# Patient Record
Sex: MALE | Race: WHITE
[De-identification: names, ages, dates, MRNs, and addresses within clinical notes are randomized per-mention and may not be internally consistent; named-entity substitution may affect disease eponyms.]

---

## 2019-08-20 ENCOUNTER — HOSPITAL ENCOUNTER (EMERGENCY)
Dept: HOSPITAL 75 - ER | Age: 64
Discharge: PSYCHIATRIC OR SUBSTANCE USE FACILITY | End: 2019-08-20
Payer: COMMERCIAL

## 2019-08-20 VITALS — WEIGHT: 145 LBS | HEIGHT: 70 IN | BODY MASS INDEX: 20.76 KG/M2

## 2019-08-20 VITALS — SYSTOLIC BLOOD PRESSURE: 122 MMHG | DIASTOLIC BLOOD PRESSURE: 86 MMHG

## 2019-08-20 DIAGNOSIS — R45.851: Primary | ICD-10-CM

## 2019-08-20 LAB
ALBUMIN SERPL-MCNC: 4.3 GM/DL (ref 3.2–4.5)
ALP SERPL-CCNC: 92 U/L (ref 40–136)
ALT SERPL-CCNC: 10 U/L (ref 0–55)
APAP SERPL-MCNC: < 10 UG/ML (ref 10–30)
APTT PPP: YELLOW S
BACTERIA #/AREA URNS HPF: NEGATIVE /HPF
BARBITURATES UR QL: NEGATIVE
BASOPHILS # BLD AUTO: 0 10^3/UL (ref 0–0.1)
BASOPHILS NFR BLD AUTO: 1 % (ref 0–10)
BENZODIAZ UR QL SCN: NEGATIVE
BILIRUB SERPL-MCNC: 0.4 MG/DL (ref 0.1–1)
BILIRUB UR QL STRIP: NEGATIVE
BUN/CREAT SERPL: 13
CALCIUM SERPL-MCNC: 9.1 MG/DL (ref 8.5–10.1)
CHLORIDE SERPL-SCNC: 102 MMOL/L (ref 98–107)
CO2 SERPL-SCNC: 22 MMOL/L (ref 21–32)
COCAINE UR QL: NEGATIVE
CREAT SERPL-MCNC: 0.99 MG/DL (ref 0.6–1.3)
EOSINOPHIL # BLD AUTO: 0.1 10^3/UL (ref 0–0.3)
EOSINOPHIL NFR BLD AUTO: 2 % (ref 0–10)
ERYTHROCYTE [DISTWIDTH] IN BLOOD BY AUTOMATED COUNT: 12.8 % (ref 10–14.5)
FIBRINOGEN PPP-MCNC: CLEAR MG/DL
GFR SERPLBLD BASED ON 1.73 SQ M-ARVRAT: > 60 ML/MIN
GLUCOSE SERPL-MCNC: 116 MG/DL (ref 70–105)
GLUCOSE UR STRIP-MCNC: NEGATIVE MG/DL
HCT VFR BLD CALC: 43 % (ref 40–54)
HGB BLD-MCNC: 14.9 G/DL (ref 13.3–17.7)
KETONES UR QL STRIP: NEGATIVE
LEUKOCYTE ESTERASE UR QL STRIP: NEGATIVE
LYMPHOCYTES # BLD AUTO: 1.3 X 10^3 (ref 1–4)
LYMPHOCYTES NFR BLD AUTO: 29 % (ref 12–44)
MANUAL DIFFERENTIAL PERFORMED BLD QL: NO
MCH RBC QN AUTO: 31 PG (ref 25–34)
MCHC RBC AUTO-ENTMCNC: 35 G/DL (ref 32–36)
MCV RBC AUTO: 89 FL (ref 80–99)
METHADONE UR QL SCN: NEGATIVE
METHAMPHETAMINE SCREEN URINE S: NEGATIVE
MONOCYTES # BLD AUTO: 0.5 X 10^3 (ref 0–1)
MONOCYTES NFR BLD AUTO: 10 % (ref 0–12)
NEUTROPHILS # BLD AUTO: 2.7 X 10^3 (ref 1.8–7.8)
NEUTROPHILS NFR BLD AUTO: 59 % (ref 42–75)
NITRITE UR QL STRIP: NEGATIVE
OPIATES UR QL SCN: NEGATIVE
OXYCODONE UR QL: NEGATIVE
PH UR STRIP: 5 [PH] (ref 5–9)
PLATELET # BLD: 239 10^3/UL (ref 130–400)
PMV BLD AUTO: 10.7 FL (ref 7.4–10.4)
POTASSIUM SERPL-SCNC: 4.4 MMOL/L (ref 3.6–5)
PROPOXYPH UR QL: NEGATIVE
PROT SERPL-MCNC: 6.9 GM/DL (ref 6.4–8.2)
PROT UR QL STRIP: NEGATIVE
RBC #/AREA URNS HPF: (no result) /HPF
SALICYLATES SERPL-MCNC: < 5 MG/DL (ref 5–20)
SODIUM SERPL-SCNC: 133 MMOL/L (ref 135–145)
SP GR UR STRIP: 1.02 (ref 1.02–1.02)
SQUAMOUS #/AREA URNS HPF: (no result) /HPF
TRICYCLICS UR QL SCN: NEGATIVE
UROBILINOGEN UR-MCNC: NORMAL MG/DL
WBC # BLD AUTO: 4.6 10^3/UL (ref 4.3–11)
WBC #/AREA URNS HPF: (no result) /HPF

## 2019-08-20 PROCEDURE — 80306 DRUG TEST PRSMV INSTRMNT: CPT

## 2019-08-20 PROCEDURE — 93005 ELECTROCARDIOGRAM TRACING: CPT

## 2019-08-20 PROCEDURE — 80320 DRUG SCREEN QUANTALCOHOLS: CPT

## 2019-08-20 PROCEDURE — 80053 COMPREHEN METABOLIC PANEL: CPT

## 2019-08-20 PROCEDURE — 85025 COMPLETE CBC W/AUTO DIFF WBC: CPT

## 2019-08-20 PROCEDURE — 36415 COLL VENOUS BLD VENIPUNCTURE: CPT

## 2019-08-20 PROCEDURE — 81000 URINALYSIS NONAUTO W/SCOPE: CPT

## 2019-08-20 PROCEDURE — 80329 ANALGESICS NON-OPIOID 1 OR 2: CPT

## 2019-08-20 NOTE — ED NEUROLOGICAL PROBLEM
General


Stated Complaint:  SUICIDAL IDEATION


Source:  patient


Exam Limitations:  no limitations





History of Present Illness


Date Seen by Provider:  Aug 20, 2019


Time Seen by Provider:  11:34


Initial Comments


To ER with reports that he is suicidal. She relates this to being homeless. He 

was living in [place], he moved here to be away from "those temptations. He is 

been clean from amphetamines and opiates for 2 months. States he plans to 

overdose.


Timing/Duration:  increasing


Severity:  moderate


Associated Symptoms:  other (suicidal)





Allergies and Home Medications


Patient Home Medication List


Home Medication List Reviewed:  Yes





Review of Systems


Review of Systems


Constitutional:  see HPI


Eyes:  No Symptoms Reported


Ears, Nose, Mouth, Throat:  no symptoms reported


Respiratory:  no symptoms reported


Cardiovascular:  no symptoms reported


Genitourinary:  no symptoms reported


Musculoskeletal:  no symptoms reported


Skin:  no symptoms reported


Psychiatric/Neurological:  See HPI





Physical Exam


Vital Signs





Vital Signs - First Documented








 8/20/19





 11:15


 


Temp 97.8


 


Pulse 93


 


Resp 20


 


B/P (MAP) 148/100 (116)


 


Pulse Ox 100


 


O2 Delivery Room Air





Capillary Refill :


Height, Weight, BMI


Height: '"


Weight: lbs. oz. kg;  BMI


Method:


General Appearance:  WD/WN, no apparent distress


HEENT:  PERRL/EOMI, normal ENT inspection


Respiratory:  no respiratory distress, no accessory muscle use


Cardiovascular:  regular rate, rhythm, no murmur


Gastrointestinal:  normal bowel sounds, non tender, soft


Neurologic/Psychiatric:  alert, normal mood/affect, oriented x 3


Crainal Nerves:  normal hearing, normal speech, PERRL


Skin:  normal color, warm/dry





Progress/Results/Core Measures


Results/Orders


Lab Results





Laboratory Tests








Test


 8/20/19


11:50 8/20/19


11:57 Range/Units


 


 


Urine Color YELLOW    


 


Urine Clarity CLEAR    


 


Urine pH 5   5-9  


 


Urine Specific Gravity 1.020   1.016-1.022  


 


Urine Protein NEGATIVE   NEGATIVE  


 


Urine Glucose (UA) NEGATIVE   NEGATIVE  


 


Urine Ketones NEGATIVE   NEGATIVE  


 


Urine Nitrite NEGATIVE   NEGATIVE  


 


Urine Bilirubin NEGATIVE   NEGATIVE  


 


Urine Urobilinogen NORMAL   NORMAL  MG/DL


 


Urine Leukocyte Esterase NEGATIVE   NEGATIVE  


 


Urine RBC (Auto) NEGATIVE   NEGATIVE  


 


Urine RBC NONE    /HPF


 


Urine WBC NONE    /HPF


 


Urine Squamous Epithelial


Cells RARE 


 


  /HPF





 


Urine Crystals NONE    /LPF


 


Urine Bacteria NEGATIVE    /HPF


 


Urine Casts NONE    /LPF


 


Urine Mucus MODERATE H   /LPF


 


Urine Culture Indicated NO    


 


Urine Opiates Screen NEGATIVE   NEGATIVE  


 


Urine Oxycodone Screen NEGATIVE   NEGATIVE  


 


Urine Methadone Screen NEGATIVE   NEGATIVE  


 


Urine Propoxyphene Screen NEGATIVE   NEGATIVE  


 


Urine Barbiturates Screen NEGATIVE   NEGATIVE  


 


Ur Tricyclic Antidepressants


Screen NEGATIVE 


 


 NEGATIVE  





 


Urine Phencyclidine Screen NEGATIVE   NEGATIVE  


 


Urine Amphetamines Screen NEGATIVE   NEGATIVE  


 


Urine Methamphetamines Screen NEGATIVE   NEGATIVE  


 


Urine Benzodiazepines Screen NEGATIVE   NEGATIVE  


 


Urine Cocaine Screen NEGATIVE   NEGATIVE  


 


Urine Cannabinoids Screen NEGATIVE   NEGATIVE  


 


White Blood Count


 


 4.6 


 4.3-11.0


10^3/uL


 


Red Blood Count


 


 4.82 


 4.35-5.85


10^6/uL


 


Hemoglobin  14.9  13.3-17.7  G/DL


 


Hematocrit  43  40-54  %


 


Mean Corpuscular Volume  89  80-99  FL


 


Mean Corpuscular Hemoglobin  31  25-34  PG


 


Mean Corpuscular Hemoglobin


Concent 


 35 


 32-36  G/DL





 


Red Cell Distribution Width  12.8  10.0-14.5  %


 


Platelet Count


 


 239 


 130-400


10^3/uL


 


Mean Platelet Volume  10.7 H 7.4-10.4  FL


 


Neutrophils (%) (Auto)  59  42-75  %


 


Lymphocytes (%) (Auto)  29  12-44  %


 


Monocytes (%) (Auto)  10  0-12  %


 


Eosinophils (%) (Auto)  2  0-10  %


 


Basophils (%) (Auto)  1  0-10  %


 


Neutrophils # (Auto)  2.7  1.8-7.8  X 10^3


 


Lymphocytes # (Auto)  1.3  1.0-4.0  X 10^3


 


Monocytes # (Auto)  0.5  0.0-1.0  X 10^3


 


Eosinophils # (Auto)


 


 0.1 


 0.0-0.3


10^3/uL


 


Basophils # (Auto)


 


 0.0 


 0.0-0.1


10^3/uL


 


Sodium Level  133 L 135-145  MMOL/L


 


Potassium Level  4.4  3.6-5.0  MMOL/L


 


Chloride Level  102    MMOL/L


 


Carbon Dioxide Level  22  21-32  MMOL/L


 


Anion Gap  9  5-14  MMOL/L


 


Blood Urea Nitrogen  13  7-18  MG/DL


 


Creatinine


 


 0.99 


 0.60-1.30


MG/DL


 


Estimat Glomerular Filtration


Rate 


 > 60 


  





 


BUN/Creatinine Ratio  13   


 


Glucose Level  116 H   MG/DL


 


Calcium Level  9.1  8.5-10.1  MG/DL


 


Corrected Calcium  8.9  8.5-10.1  MG/DL


 


Total Bilirubin  0.4  0.1-1.0  MG/DL


 


Aspartate Amino Transf


(AST/SGOT) 


 11 


 5-34  U/L





 


Alanine Aminotransferase


(ALT/SGPT) 


 10 


 0-55  U/L





 


Alkaline Phosphatase  92    U/L


 


Total Protein  6.9  6.4-8.2  GM/DL


 


Albumin  4.3  3.2-4.5  GM/DL


 


Salicylates Level  < 5.0 L 5.0-20.0  MG/DL


 


Acetaminophen Level  < 10 L 10-30  UG/ML


 


Serum Alcohol  < 10  <10  MG/DL








My Orders





Orders - PARKER BELLAMY


Cbc With Automated Diff (8/20/19 11:29)


Comprehensive Metabolic Panel (8/20/19 11:29)


Ua Culture If Indicated (8/20/19 11:29)


Drug Screen Stat (Urine) (8/20/19 11:29)


Ekg Tracing (8/20/19 11:29)


Alcohol (8/20/19 11:29)


Salicylate (8/20/19 11:29)


Acetaminophen (8/20/19 11:29)





Vital Signs/I&O











 8/20/19





 11:15


 


Temp 97.8


 


Pulse 93


 


Resp 20


 


B/P (MAP) 148/100 (116)


 


Pulse Ox 100


 


O2 Delivery Room Air











Departure


Communication (Admissions)


1314-Alireza welch from [facility] here to screen patient.





Impression





   Primary Impression:  


   Suicidal ideations


Disposition:  01 HOME, SELF-CARE


Condition:  Stable











PARKER BELLAMY             Aug 20, 2019 11:36

## 2019-08-25 ENCOUNTER — HOSPITAL ENCOUNTER (EMERGENCY)
Dept: HOSPITAL 75 - ER | Age: 64
Discharge: PSYCHIATRIC OR SUBSTANCE USE FACILITY | End: 2019-08-25
Payer: MEDICARE

## 2019-08-25 VITALS — DIASTOLIC BLOOD PRESSURE: 88 MMHG | SYSTOLIC BLOOD PRESSURE: 149 MMHG

## 2019-08-25 VITALS — BODY MASS INDEX: 21.47 KG/M2 | HEIGHT: 70 IN | WEIGHT: 150 LBS

## 2019-08-25 DIAGNOSIS — R45.851: Primary | ICD-10-CM

## 2019-08-25 DIAGNOSIS — F31.9: ICD-10-CM

## 2019-08-25 LAB
ALBUMIN SERPL-MCNC: 4.4 GM/DL (ref 3.2–4.5)
ALP SERPL-CCNC: 102 U/L (ref 40–136)
ALT SERPL-CCNC: 11 U/L (ref 0–55)
APAP SERPL-MCNC: < 10 UG/ML (ref 10–30)
APTT PPP: YELLOW S
BACTERIA #/AREA URNS HPF: (no result) /HPF
BARBITURATES UR QL: NEGATIVE
BASOPHILS # BLD AUTO: 0.1 10^3/UL (ref 0–0.1)
BASOPHILS NFR BLD AUTO: 1 % (ref 0–10)
BENZODIAZ UR QL SCN: NEGATIVE
BILIRUB SERPL-MCNC: 0.8 MG/DL (ref 0.1–1)
BILIRUB UR QL STRIP: NEGATIVE
BUN/CREAT SERPL: 21
CALCIUM SERPL-MCNC: 9.2 MG/DL (ref 8.5–10.1)
CHLORIDE SERPL-SCNC: 105 MMOL/L (ref 98–107)
CO2 SERPL-SCNC: 19 MMOL/L (ref 21–32)
COCAINE UR QL: NEGATIVE
CREAT SERPL-MCNC: 1 MG/DL (ref 0.6–1.3)
EOSINOPHIL # BLD AUTO: 0.2 10^3/UL (ref 0–0.3)
EOSINOPHIL NFR BLD AUTO: 2 % (ref 0–10)
ERYTHROCYTE [DISTWIDTH] IN BLOOD BY AUTOMATED COUNT: 13 % (ref 10–14.5)
FIBRINOGEN PPP-MCNC: (no result) MG/DL
GFR SERPLBLD BASED ON 1.73 SQ M-ARVRAT: > 60 ML/MIN
GLUCOSE SERPL-MCNC: 102 MG/DL (ref 70–105)
GLUCOSE UR STRIP-MCNC: NEGATIVE MG/DL
HCT VFR BLD CALC: 45 % (ref 40–54)
HGB BLD-MCNC: 15.6 G/DL (ref 13.3–17.7)
KETONES UR QL STRIP: (no result)
LEUKOCYTE ESTERASE UR QL STRIP: (no result)
LYMPHOCYTES # BLD AUTO: 2 X 10^3 (ref 1–4)
LYMPHOCYTES NFR BLD AUTO: 26 % (ref 12–44)
MANUAL DIFFERENTIAL PERFORMED BLD QL: NO
MCH RBC QN AUTO: 31 PG (ref 25–34)
MCHC RBC AUTO-ENTMCNC: 35 G/DL (ref 32–36)
MCV RBC AUTO: 87 FL (ref 80–99)
METHADONE UR QL SCN: NEGATIVE
METHAMPHETAMINE SCREEN URINE S: NEGATIVE
MONOCYTES # BLD AUTO: 0.8 X 10^3 (ref 0–1)
MONOCYTES NFR BLD AUTO: 10 % (ref 0–12)
NEUTROPHILS # BLD AUTO: 4.9 X 10^3 (ref 1.8–7.8)
NEUTROPHILS NFR BLD AUTO: 62 % (ref 42–75)
NITRITE UR QL STRIP: NEGATIVE
OPIATES UR QL SCN: NEGATIVE
OXYCODONE UR QL: NEGATIVE
PH UR STRIP: 5 [PH] (ref 5–9)
PLATELET # BLD: 268 10^3/UL (ref 130–400)
PMV BLD AUTO: 10.4 FL (ref 7.4–10.4)
POTASSIUM SERPL-SCNC: 4.2 MMOL/L (ref 3.6–5)
PROPOXYPH UR QL: NEGATIVE
PROT SERPL-MCNC: 7 GM/DL (ref 6.4–8.2)
PROT UR QL STRIP: (no result)
RBC #/AREA URNS HPF: (no result) /HPF
SALICYLATES SERPL-MCNC: < 5 MG/DL (ref 5–20)
SODIUM SERPL-SCNC: 137 MMOL/L (ref 135–145)
SP GR UR STRIP: 1.02 (ref 1.02–1.02)
TRICYCLICS UR QL SCN: NEGATIVE
UROBILINOGEN UR-MCNC: NORMAL MG/DL
WBC # BLD AUTO: 7.9 10^3/UL (ref 4.3–11)
WBC #/AREA URNS HPF: (no result) /HPF

## 2019-08-25 PROCEDURE — 85025 COMPLETE CBC W/AUTO DIFF WBC: CPT

## 2019-08-25 PROCEDURE — 80329 ANALGESICS NON-OPIOID 1 OR 2: CPT

## 2019-08-25 PROCEDURE — 80320 DRUG SCREEN QUANTALCOHOLS: CPT

## 2019-08-25 PROCEDURE — 87088 URINE BACTERIA CULTURE: CPT

## 2019-08-25 PROCEDURE — 80053 COMPREHEN METABOLIC PANEL: CPT

## 2019-08-25 PROCEDURE — 80306 DRUG TEST PRSMV INSTRMNT: CPT

## 2019-08-25 PROCEDURE — 81000 URINALYSIS NONAUTO W/SCOPE: CPT

## 2019-08-25 PROCEDURE — 36415 COLL VENOUS BLD VENIPUNCTURE: CPT

## 2019-08-25 PROCEDURE — 93005 ELECTROCARDIOGRAM TRACING: CPT

## 2019-08-25 NOTE — ED PSYCHOSOCIAL
General


Chief Complaint:  Psych/Social Disorder


Stated Complaint:  DEPRESSION


Source:  patient


Exam Limitations:  no limitations





History of Present Illness


Date Seen by Provider:  Aug 25, 2019


Time Seen by Provider:  12:07


Initial Comments


To ER per produced with reports of suicidal. He was seen here on the 20th 15, 

transferred to [facility]. He states that they did nothing 

for him other than continue the Wellbutrin and the lithium that he had stopped 

himself about 2 weeks prior. States he was discharged from there on Friday the 

23rd which she felt was too soon. Presents today with recurrence of suicidal 

thoughts, states he suicidal because he feels like a failure. He was from [place] 

[place]iginally, moves [place] and then recently moved back here. Back here to get out 

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


Timing/Duration:  constant, getting worse


Severity:  moderate





Allergies and Home Medications


Allergies


Coded Allergies:  


     No Known Drug Allergies (Unverified , 8/25/19)





Patient Home Medication List


Home Medication List Reviewed:  Yes





Review of Systems


Constitutional:  see HPI


EENTM:  see HPI


Respiratory:  no symptoms reported


Cardiovascular:  no symptoms reported


Genitourinary:  no symptoms reported


Musculoskeletal:  no symptoms reported


Skin:  no symptoms reported


Psychiatric/Neurological:  See HPI, Depressed





Past Medical-Social-Family Hx


Patient Social History


Type Used:  Cigarettes


Recent Foreign Travel:  No


Contact w/Someone Who Travel:  No





Past Medical History


Psychosocial:  Yes


Sleep Difficulties, Bipolar, Depression





Physical Exam





Vital Signs - First Documented








 8/25/19





 11:50


 


Temp 97.7


 


Pulse 88


 


Resp 16


 


B/P (MAP) 149/88 (108)


 


Pulse Ox 100


 


O2 Delivery Room Air





Capillary Refill :


Height, Weight, BMI


Height: 5'10.00"


Weight: 145lbs. oz. [id number];  BMI


Method:Stated


General Appearance:  WD/WN, no apparent distress


Respiratory:  normal breath sounds, no respiratory distress, no accessory muscle

use


Cardiovascular:  regular rate, rhythm, no murmur


Gastrointestinal:  normal bowel sounds, non tender, soft


Neurologic/Psychiatric:  alert, normal mood/affect, oriented x 3


Appearance/Memory:  appropriate appearance, appropriate insight


Behavior/Eye Contact:  cooperative, good eye contact


Skin:  normal color, warm/dry


He is clean, pleasant, cooperative and appreciative.





Progress/Results/Core Measures


Results/Orders


Lab Results





Laboratory Tests








Test


 8/25/19


12:03 8/25/19


12:30 Range/Units


 


 


Urine Color YELLOW    


 


Urine Clarity


 SLIGHTLY


CLOUDY 


  





 


Urine pH 5   5-9  


 


Urine Specific Gravity 1.025 H  1.016-1.022  


 


Urine Protein 2+ H  NEGATIVE  


 


Urine Glucose (UA) NEGATIVE   NEGATIVE  


 


Urine Ketones 2+ H  NEGATIVE  


 


Urine Nitrite NEGATIVE   NEGATIVE  


 


Urine Bilirubin NEGATIVE   NEGATIVE  


 


Urine Urobilinogen NORMAL   NORMAL  MG/DL


 


Urine Leukocyte Esterase 1+ H  NEGATIVE  


 


Urine RBC (Auto) 5+ H  NEGATIVE  


 


Urine RBC NONE    /HPF


 


Urine WBC 5-10 H   /HPF


 


Urine Crystals NONE    /LPF


 


Urine Bacteria MODERATE H   /HPF


 


Urine Casts NONE    /LPF


 


Urine Mucus MODERATE H   /LPF


 


Urine Culture Indicated YES    


 


Urine Opiates Screen NEGATIVE   NEGATIVE  


 


Urine Oxycodone Screen NEGATIVE   NEGATIVE  


 


Urine Methadone Screen NEGATIVE   NEGATIVE  


 


Urine Propoxyphene Screen NEGATIVE   NEGATIVE  


 


Urine Barbiturates Screen NEGATIVE   NEGATIVE  


 


Ur Tricyclic Antidepressants


Screen NEGATIVE 


 


 NEGATIVE  





 


Urine Phencyclidine Screen NEGATIVE   NEGATIVE  


 


Urine Amphetamines Screen NEGATIVE   NEGATIVE  


 


Urine Methamphetamines Screen NEGATIVE   NEGATIVE  


 


Urine Benzodiazepines Screen NEGATIVE   NEGATIVE  


 


Urine Cocaine Screen NEGATIVE   NEGATIVE  


 


Urine Cannabinoids Screen NEGATIVE   NEGATIVE  


 


White Blood Count


 


 7.9 


 4.3-11.0


10^3/uL


 


Red Blood Count


 


 5.09 


 4.35-5.85


10^6/uL


 


Hemoglobin  15.6  13.3-17.7  G/DL


 


Hematocrit  45  40-54  %


 


Mean Corpuscular Volume  87  80-99  FL


 


Mean Corpuscular Hemoglobin  31  25-34  PG


 


Mean Corpuscular Hemoglobin


Concent 


 35 


 32-36  G/DL





 


Red Cell Distribution Width  13.0  10.0-14.5  %


 


Platelet Count


 


 268 


 130-400


10^3/uL


 


Mean Platelet Volume  10.4  7.4-10.4  FL


 


Neutrophils (%) (Auto)  62  42-75  %


 


Lymphocytes (%) (Auto)  26  12-44  %


 


Monocytes (%) (Auto)  10  0-12  %


 


Eosinophils (%) (Auto)  2  0-10  %


 


Basophils (%) (Auto)  1  0-10  %


 


Neutrophils # (Auto)  4.9  1.8-7.8  X 10^3


 


Lymphocytes # (Auto)  2.0  1.0-4.0  X 10^3


 


Monocytes # (Auto)  0.8  0.0-1.0  X 10^3


 


Eosinophils # (Auto)


 


 0.2 


 0.0-0.3


10^3/uL


 


Basophils # (Auto)


 


 0.1 


 0.0-0.1


10^3/uL


 


Sodium Level  137  135-145  MMOL/L


 


Potassium Level  4.2  3.6-5.0  MMOL/L


 


Chloride Level  105    MMOL/L


 


Carbon Dioxide Level  19 L 21-32  MMOL/L


 


Anion Gap  13  5-14  MMOL/L


 


Blood Urea Nitrogen  21 H 7-18  MG/DL


 


Creatinine


 


 1.00 


 0.60-1.30


MG/DL


 


Estimat Glomerular Filtration


Rate 


 > 60 


  





 


BUN/Creatinine Ratio  21   


 


Glucose Level  102    MG/DL


 


Calcium Level  9.2  8.5-10.1  MG/DL


 


Corrected Calcium  8.9  8.5-10.1  MG/DL


 


Total Bilirubin  0.8  0.1-1.0  MG/DL


 


Aspartate Amino Transf


(AST/SGOT) 


 16 


 5-34  U/L





 


Alanine Aminotransferase


(ALT/SGPT) 


 11 


 0-55  U/L





 


Alkaline Phosphatase  102    U/L


 


Total Protein  7.0  6.4-8.2  GM/DL


 


Albumin  4.4  3.2-4.5  GM/DL


 


Salicylates Level  < 5.0 L 5.0-20.0  MG/DL


 


Acetaminophen Level  < 10 L 10-30  UG/ML


 


Serum Alcohol  < 10  <10  MG/DL








My Orders





Orders - PARKER BELLAMY


Cbc With Automated Diff (8/25/19 12:01)


Comprehensive Metabolic Panel (8/25/19 12:01)


Ua Culture If Indicated (8/25/19 12:01)


Alcohol (8/25/19 12:01)


Ekg Tracing (8/25/19 12:01)


Drug Screen Stat (Urine) (8/25/19 12:01)


Acetaminophen (8/25/19 12:01)


Salicylate (8/25/19 12:01)


General/Regular (8/25/19 Lunch)


Urine Culture (8/25/19 12:03)





Vital Signs/I&O











 8/25/19





 11:50


 


Temp 97.7


 


Pulse 88


 


Resp 16


 


B/P (MAP) 149/88 (108)


 


Pulse Ox 100


 


O2 Delivery Room Air











Departure


Communication (Admissions)


0503-Called [facility]--no beds


[facility]--no beds


[facility]--[facility] beds


[facility]--no beds


[facility] could transport pt to [facility] in [place],

a homeless shelter. Pt is not interested


[facility] in  has no beds. [facility] has no beds in . [facility] in  has no beds. [facility] has no beds. [facility] [place] [facility] in 

[facility] has beds will fax info to them at [phone] 1448-[place] [facility] in [facility] [address] [facility] has accepted pt. RN called back to 

inform that physician had accepted. They do not require me to give report to 

him/her. They do require RN to call whe hes left here however. Pt agreeable to 

go and informed that he would be self pay when he got there due to being out of 

medicare days. Pt is still agreeable to go, this does not change his plan.





Impression





   Primary Impression:  


   Suicidal ideations


Disposition:  65 XFER TO PSYCH HOSP/UNIT


Condition:  Stable





Departure-Patient Inst.


Decision time for Depature:  13:22


Referrals:  


NO,LOCAL PHYSICIAN (PCP/Family)


Primary Care Physician


Patient Instructions:  Depression











PARKER BELLAMY             Aug 25, 2019 12:09

## 2019-09-12 ENCOUNTER — HOSPITAL ENCOUNTER (EMERGENCY)
Dept: HOSPITAL 75 - ER | Age: 64
Discharge: PSYCHIATRIC OR SUBSTANCE USE FACILITY | End: 2019-09-12
Payer: MEDICARE

## 2019-09-12 VITALS — DIASTOLIC BLOOD PRESSURE: 81 MMHG | SYSTOLIC BLOOD PRESSURE: 123 MMHG

## 2019-09-12 VITALS — HEIGHT: 70 IN | WEIGHT: 149.91 LBS | BODY MASS INDEX: 21.46 KG/M2

## 2019-09-12 DIAGNOSIS — F32.9: ICD-10-CM

## 2019-09-12 DIAGNOSIS — F41.9: ICD-10-CM

## 2019-09-12 DIAGNOSIS — F20.9: ICD-10-CM

## 2019-09-12 DIAGNOSIS — R45.851: Primary | ICD-10-CM

## 2019-09-12 LAB
ALBUMIN SERPL-MCNC: 4.2 GM/DL (ref 3.2–4.5)
ALP SERPL-CCNC: 84 U/L (ref 40–136)
ALT SERPL-CCNC: 10 U/L (ref 0–55)
APAP SERPL-MCNC: < 10 UG/ML (ref 10–30)
APTT PPP: YELLOW S
BACTERIA #/AREA URNS HPF: NEGATIVE /HPF
BARBITURATES UR QL: NEGATIVE
BASOPHILS # BLD AUTO: 0 10^3/UL (ref 0–0.1)
BASOPHILS NFR BLD AUTO: 1 % (ref 0–10)
BENZODIAZ UR QL SCN: NEGATIVE
BILIRUB SERPL-MCNC: 0.5 MG/DL (ref 0.1–1)
BILIRUB UR QL STRIP: NEGATIVE
BUN/CREAT SERPL: 11
CALCIUM SERPL-MCNC: 9.2 MG/DL (ref 8.5–10.1)
CHLORIDE SERPL-SCNC: 108 MMOL/L (ref 98–107)
CO2 SERPL-SCNC: 21 MMOL/L (ref 21–32)
COCAINE UR QL: NEGATIVE
CREAT SERPL-MCNC: 0.81 MG/DL (ref 0.6–1.3)
EOSINOPHIL # BLD AUTO: 0.3 10^3/UL (ref 0–0.3)
EOSINOPHIL NFR BLD AUTO: 5 % (ref 0–10)
ERYTHROCYTE [DISTWIDTH] IN BLOOD BY AUTOMATED COUNT: 13.8 % (ref 10–14.5)
FIBRINOGEN PPP-MCNC: CLEAR MG/DL
GFR SERPLBLD BASED ON 1.73 SQ M-ARVRAT: > 60 ML/MIN
GLUCOSE SERPL-MCNC: 104 MG/DL (ref 70–105)
GLUCOSE UR STRIP-MCNC: NEGATIVE MG/DL
HCT VFR BLD CALC: 43 % (ref 40–54)
HGB BLD-MCNC: 15 G/DL (ref 13.3–17.7)
KETONES UR QL STRIP: NEGATIVE
LEUKOCYTE ESTERASE UR QL STRIP: NEGATIVE
LYMPHOCYTES # BLD AUTO: 2.5 X 10^3 (ref 1–4)
LYMPHOCYTES NFR BLD AUTO: 41 % (ref 12–44)
MANUAL DIFFERENTIAL PERFORMED BLD QL: NO
MCH RBC QN AUTO: 30 PG (ref 25–34)
MCHC RBC AUTO-ENTMCNC: 35 G/DL (ref 32–36)
MCV RBC AUTO: 87 FL (ref 80–99)
METHADONE UR QL SCN: NEGATIVE
METHAMPHETAMINE SCREEN URINE S: NEGATIVE
MONOCYTES # BLD AUTO: 0.7 X 10^3 (ref 0–1)
MONOCYTES NFR BLD AUTO: 11 % (ref 0–12)
NEUTROPHILS # BLD AUTO: 2.6 X 10^3 (ref 1.8–7.8)
NEUTROPHILS NFR BLD AUTO: 42 % (ref 42–75)
NITRITE UR QL STRIP: NEGATIVE
OPIATES UR QL SCN: NEGATIVE
OXYCODONE UR QL: NEGATIVE
PH UR STRIP: 7 [PH] (ref 5–9)
PLATELET # BLD: 296 10^3/UL (ref 130–400)
PMV BLD AUTO: 10.5 FL (ref 7.4–10.4)
POTASSIUM SERPL-SCNC: 3.9 MMOL/L (ref 3.6–5)
PROPOXYPH UR QL: NEGATIVE
PROT SERPL-MCNC: 6.8 GM/DL (ref 6.4–8.2)
PROT UR QL STRIP: NEGATIVE
RBC #/AREA URNS HPF: (no result) /HPF
SALICYLATES SERPL-MCNC: < 5 MG/DL (ref 5–20)
SODIUM SERPL-SCNC: 141 MMOL/L (ref 135–145)
SP GR UR STRIP: 1.01 (ref 1.02–1.02)
SQUAMOUS #/AREA URNS HPF: (no result) /HPF
TRICYCLICS UR QL SCN: NEGATIVE
TSH SERPL DL<=0.05 MIU/L-ACNC: 2.11 UIU/ML (ref 0.35–4.94)
UROBILINOGEN UR-MCNC: NORMAL MG/DL
WBC # BLD AUTO: 6.3 10^3/UL (ref 4.3–11)
WBC #/AREA URNS HPF: (no result) /HPF

## 2019-09-12 PROCEDURE — 36415 COLL VENOUS BLD VENIPUNCTURE: CPT

## 2019-09-12 PROCEDURE — 80306 DRUG TEST PRSMV INSTRMNT: CPT

## 2019-09-12 PROCEDURE — 80329 ANALGESICS NON-OPIOID 1 OR 2: CPT

## 2019-09-12 PROCEDURE — 93005 ELECTROCARDIOGRAM TRACING: CPT

## 2019-09-12 PROCEDURE — 84443 ASSAY THYROID STIM HORMONE: CPT

## 2019-09-12 PROCEDURE — 85025 COMPLETE CBC W/AUTO DIFF WBC: CPT

## 2019-09-12 PROCEDURE — 80178 ASSAY OF LITHIUM: CPT

## 2019-09-12 PROCEDURE — 80320 DRUG SCREEN QUANTALCOHOLS: CPT

## 2019-09-12 PROCEDURE — 80053 COMPREHEN METABOLIC PANEL: CPT

## 2019-09-12 PROCEDURE — 81000 URINALYSIS NONAUTO W/SCOPE: CPT

## 2019-09-12 NOTE — ED PSYCHOSOCIAL
General


Chief Complaint:  Psych/Social Disorder


Stated Complaint:  SUICIDAL,WANTS TO HARM SELF


Nursing Triage Note:  


STATES FEELS LIKE A FAILURE AND DOESNT WANT TO GO ON.


Source:  patient


Exam Limitations:  no limitations





History of Present Illness


Date Seen by Provider:  Sep 12, 2019


Time Seen by Provider:  05:49


Initial Comments


This 64 year old man presents to the ER with complaints of suicidal ideation.  

He has a history of depression, anxiety, and substance abuse.  He reports no use

of alcohol or drugs in the past 6 months.  He was seen at this facility August 20 and admitted to the [facility] behavioral health unit in [place], [place].  He 

returned to this facility August 25 stating nothing was really done for him in 

[place] except restarting his medications.  On August 25 he was transferred to 

[facility] in [place], [place].  Patient was treated there and 

dismissed.  Since he left [place] he has not been compliant with his 

medications.  He has access to them but has elected not to take them.  He cannot

give a good reason for why he chooses to do so.  He is living in [facility] 

presently and states he temporarily has financial resources to support this.  At

present he states he feels hopeless, and laminated, and alone.  He feels like a 

failure and therefore is having suicidal thoughts.  He has been established with

[facility] and states he is dissatisfied with the 

services he receives there.  He states he does not get any one-on-one 

consultation.  When asked what he would do today if he were discharged from the 

hospital, he states "I would probably call one of the heroin girls".  Patient 

states he has been contacted by acquaintances who have access to heroin.  He was

invited to meet back up with them but declined.  Although he had an opportunity 

to access heroin, he elected not to.  His plan in relation to suicidal ideation 

would be heroin overdose.  He states he elected not to connect with these 

acquaintances because he has a daughter who lives in [place] and he does not 

want to end his life in this fashion and have the impact on her.  His current 

medications are lithium and Wellbutrin.  He took one dose of those yesterday 

which was the first time he had taken his medications since he lapsed in his 

medication therapy over the past week.





Allergies and Home Medications


Allergies


Coded Allergies:  


     No Known Drug Allergies (Unverified , 8/25/19)





Patient Home Medication List


Home Medication List Reviewed:  Yes





Review of Systems


Constitutional:  no symptoms reported


EENTM:  no symptoms reported


Respiratory:  no symptoms reported


Cardiovascular:  no symptoms reported


Gastrointestinal:  no symptoms reported


Genitourinary:  no symptoms reported


Musculoskeletal:  no symptoms reported


Skin:  no symptoms reported


Psychiatric/Neurological:  See HPI





Past Medical-Social-Family Hx


Past Med/Social Hx:  Reviewed Nursing Past Med/Soc Hx


Patient Social History


Alcohol Use:  Denies Use


Recreational Drug Use:  Yes ("I USED TO SMOKE HEROIN AND METH")


Drug of Choice:  METH REPORTS FORMER USE


Type Used:  Cigarettes


Recent Foreign Travel:  No


Contact w/Someone Who Travel:  No


Recent Infectious Disease Expo:  No


Recent Hopitalizations:  No (DANAE FOR PSYCH)


Physical Abuse:  No


Sexual Abuse:  No


Mistreated:  No


Fear:  No





Seasonal Allergies


Seasonal Allergies:  Yes





Past Medical History


Surgeries:  Yes


Prostatectomy


Respiratory:  No


Cardiac:  No


Neurological:  No


Genitourinary:  Yes


Prostate Problems


Gastrointestinal:  No (INGUINAL HERNIA)


Musculoskeletal:  No


Endocrine:  No


HEENT:  No


Cancer:  No


Psychosocial:  Yes ("BIPOLAR TYPE 2, SCHIZOID NO DILUSIONS")


Sleep Difficulties, Anxiety, Bipolar, Depression


Nursing Suicide Risk Notes:  


SISTER HAS SHUNNED HIM. FEELS LIKE A FAILURE AND DOENST WANT TO GO ON. HAS NOT 


BEEN TAKING MEDICATIONS REGULARLY.


Integumentary:  No


Blood Disorders:  No





Physical Exam





Vital Signs - First Documented








 9/12/19





 05:30


 


Temp 36.4


 


Pulse 64


 


Resp 18


 


B/P (MAP) 123/81 (95)


 


Pulse Ox 98





Capillary Refill : Less Than 3 Seconds


Height, Weight, BMI


Height: 5'10.00"


Weight: 150lbs. oz. 68.709152ls; 21.00 BMI


Method:Stated


General Appearance:  WD/WN, no apparent distress


HEENT:  PERRL/EOMI, normal ENT inspection, pharynx normal


Neck:  normal inspection


Respiratory:  lungs clear, normal breath sounds, no respiratory distress


Cardiovascular:  regular rate, rhythm, no edema, no murmur


Gastrointestinal:  normal bowel sounds, non tender, soft


Extremities:  normal inspection, no pedal edema


Neurologic/Psychiatric:  CNs II-XII nml as tested, no motor/sensory deficits, 

alert, oriented x 3, other (normal affect, states suicidal ideation)


Appearance/Memory:  appropriate appearance


Behavior/Eye Contact:  cooperative, good eye contact, normal speech


Skin:  normal color, warm/dry





Progress/Results/Core Measures


Results/Orders


Lab Results





Laboratory Tests








Test


 9/12/19


05:29 9/12/19


05:40 Range/Units


 


 


Urine Color YELLOW    


 


Urine Clarity CLEAR    


 


Urine pH 7   5-9  


 


Urine Specific Gravity 1.010 L  1.016-1.022  


 


Urine Protein NEGATIVE   NEGATIVE  


 


Urine Glucose (UA) NEGATIVE   NEGATIVE  


 


Urine Ketones NEGATIVE   NEGATIVE  


 


Urine Nitrite NEGATIVE   NEGATIVE  


 


Urine Bilirubin NEGATIVE   NEGATIVE  


 


Urine Urobilinogen NORMAL   NORMAL  MG/DL


 


Urine Leukocyte Esterase NEGATIVE   NEGATIVE  


 


Urine RBC (Auto) NEGATIVE   NEGATIVE  


 


Urine RBC NONE    /HPF


 


Urine WBC NONE    /HPF


 


Urine Squamous Epithelial


Cells RARE 


 


  /HPF





 


Urine Crystals NONE    /LPF


 


Urine Bacteria NEGATIVE    /HPF


 


Urine Casts NONE    /LPF


 


Urine Mucus NEGATIVE    /LPF


 


Urine Culture Indicated NO    


 


Urine Opiates Screen NEGATIVE   NEGATIVE  


 


Urine Oxycodone Screen NEGATIVE   NEGATIVE  


 


Urine Methadone Screen NEGATIVE   NEGATIVE  


 


Urine Propoxyphene Screen NEGATIVE   NEGATIVE  


 


Urine Barbiturates Screen NEGATIVE   NEGATIVE  


 


Ur Tricyclic Antidepressants


Screen NEGATIVE 


 


 NEGATIVE  





 


Urine Phencyclidine Screen NEGATIVE   NEGATIVE  


 


Urine Amphetamines Screen NEGATIVE   NEGATIVE  


 


Urine Methamphetamines Screen NEGATIVE   NEGATIVE  


 


Urine Benzodiazepines Screen NEGATIVE   NEGATIVE  


 


Urine Cocaine Screen NEGATIVE   NEGATIVE  


 


Urine Cannabinoids Screen NEGATIVE   NEGATIVE  


 


White Blood Count


 


 6.3 


 4.3-11.0


10^3/uL


 


Red Blood Count


 


 4.95 


 4.35-5.85


10^6/uL


 


Hemoglobin  15.0  13.3-17.7  G/DL


 


Hematocrit  43  40-54  %


 


Mean Corpuscular Volume  87  80-99  FL


 


Mean Corpuscular Hemoglobin  30  25-34  PG


 


Mean Corpuscular Hemoglobin


Concent 


 35 


 32-36  G/DL





 


Red Cell Distribution Width  13.8  10.0-14.5  %


 


Platelet Count


 


 296 


 130-400


10^3/uL


 


Mean Platelet Volume  10.5 H 7.4-10.4  FL


 


Neutrophils (%) (Auto)  42  42-75  %


 


Lymphocytes (%) (Auto)  41  12-44  %


 


Monocytes (%) (Auto)  11  0-12  %


 


Eosinophils (%) (Auto)  5  0-10  %


 


Basophils (%) (Auto)  1  0-10  %


 


Neutrophils # (Auto)  2.6  1.8-7.8  X 10^3


 


Lymphocytes # (Auto)  2.5  1.0-4.0  X 10^3


 


Monocytes # (Auto)  0.7  0.0-1.0  X 10^3


 


Eosinophils # (Auto)


 


 0.3 


 0.0-0.3


10^3/uL


 


Basophils # (Auto)


 


 0.0 


 0.0-0.1


10^3/uL


 


Sodium Level  141  135-145  MMOL/L


 


Potassium Level  3.9  3.6-5.0  MMOL/L


 


Chloride Level  108 H   MMOL/L


 


Carbon Dioxide Level  21  21-32  MMOL/L


 


Anion Gap  12  5-14  MMOL/L


 


Blood Urea Nitrogen  9  7-18  MG/DL


 


Creatinine


 


 0.81 


 0.60-1.30


MG/DL


 


Estimat Glomerular Filtration


Rate 


 > 60 


  





 


BUN/Creatinine Ratio  11   


 


Glucose Level  104    MG/DL


 


Calcium Level  9.2  8.5-10.1  MG/DL


 


Corrected Calcium  9.0  8.5-10.1  MG/DL


 


Total Bilirubin  0.5  0.1-1.0  MG/DL


 


Aspartate Amino Transf


(AST/SGOT) 


 17 


 5-34  U/L





 


Alanine Aminotransferase


(ALT/SGPT) 


 10 


 0-55  U/L





 


Alkaline Phosphatase  84    U/L


 


Total Protein  6.8  6.4-8.2  GM/DL


 


Albumin  4.2  3.2-4.5  GM/DL


 


TSH Cascade Testing


 


 2.11 


 0.35-4.94


UIU/ML


 


Salicylates Level  < 5.0 L 5.0-20.0  MG/DL


 


Acetaminophen Level  < 10 L 10-30  UG/ML


 


Serum Alcohol  < 10  <10  MG/DL








My Orders





Orders - PRASHANT GRIJALVA MD


[place] Level (9/12/19 06:26)


General/Regular (9/12/19 Breakfast)





Vital Signs/I&O











 9/12/19 9/12/19





 05:30 11:03


 


Temp 36.4 36.4


 


Pulse 64 64


 


Resp 18 18


 


B/P (MAP) 123/81 (95) 123/81 (95)


 


Pulse Ox 98 98














Blood Pressure Mean:                    95











Progress


Progress Note #1:  


   Time:  07:00


Progress Note


Lab screening was unremarkable.  Patient appears to be manipulative of the 

behavioral health system in that he makes demands, does not follow through with 

recommendations were refuses services, is noncompliant with his medications, and

then complains that his offered services are in adequate.  I discussed the case 

with Anamaria, the [facility] screener.  She will defer further discussion 

to Tye Yen.  Given patient's history and noncompliance, I'm not sure another 

inpatient admission would be the most productive therapy option for him.  I will

discuss this further with Mr. Yen when he returns my call and obtain his expert

opinion.


Progress Note #2:  


   Time:  07:37


Progress Note


I discussed the case with Tye Yen with [facility].  We had developed a 

plan for intensive outpatient follow-up to try to boost patients out patient 

services.  Inpatient admission was not felt the best option for this patient as 

he has failed inpatient admission twice in the past month and much of his 

problem is refusal to be compliant with treatment plans.  Mr. Yen's plan was to

have  work with the patient to develop a more suitable treatment 

plan.  However, when I discussed this plan with the patient, he stated he 

intended to commit suicide by overdose today if he were discharged.  He reported

he can probably accomplish this within 2 hours.  He asserts he needs to be 

admitted somewhere with security for his safety.


Progress Note #3:  


   Time:  07:53


Progress Note


Have discussed this case with Dr. Jennings and the screener at [facility].  They will review his chart and make a determination on his 

eligibility.


Progress Note #4:  


   Time:  08:35


Progress Note


The [facility] unit declined admission.  They're familiar 

with the patient, and they do not believe there facility is the best resource 

for him.  They suggested an adult psychiatric unit due to history of substance 

abuse and manipulative behavior rather than a senior behavioral health unit.  I 

have left messages at [facility] and [facility] in [place] and [facility] in [place].





Departure


Impression





   Primary Impression:  


   Suicidal ideation


Disposition:  02 XFER SHT-TRM HOSP


Condition:  Stable





Transfer


Transfer Progress Notes


Patient was transported to [facility] by [facility].  

Dr. Simms was the accepting physician.


Transfer Time:  11:05


Transfer Facility:  


[facility] in [place], [place]


Method of Transfer:  





Departure-Patient Inst.


Referrals:  


NO,LOCAL PHYSICIAN (PCP/Family)


Primary Care Physician











PRASHANT GRIJALVA MD        Sep 12, 2019 06:46

## 2019-10-10 ENCOUNTER — HOSPITAL ENCOUNTER (EMERGENCY)
Dept: HOSPITAL 75 - ER | Age: 64
Discharge: PSYCHIATRIC OR SUBSTANCE USE FACILITY | End: 2019-10-10
Payer: MEDICARE

## 2019-10-10 VITALS — SYSTOLIC BLOOD PRESSURE: 120 MMHG | DIASTOLIC BLOOD PRESSURE: 71 MMHG

## 2019-10-10 VITALS — HEIGHT: 70 IN | BODY MASS INDEX: 21.46 KG/M2 | WEIGHT: 149.91 LBS

## 2019-10-10 DIAGNOSIS — F17.210: ICD-10-CM

## 2019-10-10 DIAGNOSIS — Z98.890: ICD-10-CM

## 2019-10-10 DIAGNOSIS — N39.0: ICD-10-CM

## 2019-10-10 DIAGNOSIS — F31.9: ICD-10-CM

## 2019-10-10 DIAGNOSIS — R45.851: Primary | ICD-10-CM

## 2019-10-10 DIAGNOSIS — F41.9: ICD-10-CM

## 2019-10-10 LAB
ALBUMIN SERPL-MCNC: 3.8 GM/DL (ref 3.2–4.5)
ALP SERPL-CCNC: 82 U/L (ref 40–136)
ALT SERPL-CCNC: 11 U/L (ref 0–55)
APAP SERPL-MCNC: < 10 UG/ML (ref 10–30)
APTT PPP: YELLOW S
BACTERIA #/AREA URNS HPF: NEGATIVE /HPF
BARBITURATES UR QL: NEGATIVE
BASOPHILS # BLD AUTO: 0.1 10^3/UL (ref 0–0.1)
BASOPHILS NFR BLD AUTO: 1 % (ref 0–10)
BENZODIAZ UR QL SCN: NEGATIVE
BILIRUB SERPL-MCNC: 0.6 MG/DL (ref 0.1–1)
BILIRUB UR QL STRIP: NEGATIVE
BUN/CREAT SERPL: 13
CALCIUM SERPL-MCNC: 8.5 MG/DL (ref 8.5–10.1)
CHLORIDE SERPL-SCNC: 105 MMOL/L (ref 98–107)
CO2 SERPL-SCNC: 24 MMOL/L (ref 21–32)
COCAINE UR QL: NEGATIVE
CREAT SERPL-MCNC: 0.84 MG/DL (ref 0.6–1.3)
EOSINOPHIL # BLD AUTO: 0.2 10^3/UL (ref 0–0.3)
EOSINOPHIL NFR BLD AUTO: 3 % (ref 0–10)
ERYTHROCYTE [DISTWIDTH] IN BLOOD BY AUTOMATED COUNT: 13.4 % (ref 10–14.5)
FIBRINOGEN PPP-MCNC: CLEAR MG/DL
GFR SERPLBLD BASED ON 1.73 SQ M-ARVRAT: > 60 ML/MIN
GLUCOSE SERPL-MCNC: 100 MG/DL (ref 70–105)
GLUCOSE UR STRIP-MCNC: NEGATIVE MG/DL
HCT VFR BLD CALC: 38 % (ref 40–54)
HGB BLD-MCNC: 13.4 G/DL (ref 13.3–17.7)
KETONES UR QL STRIP: NEGATIVE
LEUKOCYTE ESTERASE UR QL STRIP: NEGATIVE
LYMPHOCYTES # BLD AUTO: 1.9 X 10^3 (ref 1–4)
LYMPHOCYTES NFR BLD AUTO: 33 % (ref 12–44)
MANUAL DIFFERENTIAL PERFORMED BLD QL: NO
MCH RBC QN AUTO: 32 PG (ref 25–34)
MCHC RBC AUTO-ENTMCNC: 35 G/DL (ref 32–36)
MCV RBC AUTO: 89 FL (ref 80–99)
METHADONE UR QL SCN: NEGATIVE
METHAMPHETAMINE SCREEN URINE S: NEGATIVE
MONOCYTES # BLD AUTO: 0.6 X 10^3 (ref 0–1)
MONOCYTES NFR BLD AUTO: 10 % (ref 0–12)
NEUTROPHILS # BLD AUTO: 3 X 10^3 (ref 1.8–7.8)
NEUTROPHILS NFR BLD AUTO: 53 % (ref 42–75)
NITRITE UR QL STRIP: NEGATIVE
OPIATES UR QL SCN: NEGATIVE
OXYCODONE UR QL: NEGATIVE
PH UR STRIP: 5 [PH] (ref 5–9)
PLATELET # BLD: 258 10^3/UL (ref 130–400)
PMV BLD AUTO: 10.2 FL (ref 7.4–10.4)
POTASSIUM SERPL-SCNC: 3.9 MMOL/L (ref 3.6–5)
PROPOXYPH UR QL: NEGATIVE
PROT SERPL-MCNC: 6.2 GM/DL (ref 6.4–8.2)
PROT UR QL STRIP: (no result)
RBC #/AREA URNS HPF: (no result) /HPF
SALICYLATES SERPL-MCNC: < 5 MG/DL (ref 5–20)
SODIUM SERPL-SCNC: 135 MMOL/L (ref 135–145)
SP GR UR STRIP: 1.02 (ref 1.02–1.02)
TRICYCLICS UR QL SCN: NEGATIVE
UROBILINOGEN UR-MCNC: NORMAL MG/DL
WBC # BLD AUTO: 5.8 10^3/UL (ref 4.3–11)
WBC #/AREA URNS HPF: (no result) /HPF

## 2019-10-10 PROCEDURE — 87491 CHLMYD TRACH DNA AMP PROBE: CPT

## 2019-10-10 PROCEDURE — 96372 THER/PROPH/DIAG INJ SC/IM: CPT

## 2019-10-10 PROCEDURE — 36415 COLL VENOUS BLD VENIPUNCTURE: CPT

## 2019-10-10 PROCEDURE — 81000 URINALYSIS NONAUTO W/SCOPE: CPT

## 2019-10-10 PROCEDURE — 80306 DRUG TEST PRSMV INSTRMNT: CPT

## 2019-10-10 PROCEDURE — 80053 COMPREHEN METABOLIC PANEL: CPT

## 2019-10-10 PROCEDURE — 80320 DRUG SCREEN QUANTALCOHOLS: CPT

## 2019-10-10 PROCEDURE — 93005 ELECTROCARDIOGRAM TRACING: CPT

## 2019-10-10 PROCEDURE — 80329 ANALGESICS NON-OPIOID 1 OR 2: CPT

## 2019-10-10 PROCEDURE — 86780 TREPONEMA PALLIDUM: CPT

## 2019-10-10 PROCEDURE — 87591 N.GONORRHOEAE DNA AMP PROB: CPT

## 2019-10-10 PROCEDURE — 85025 COMPLETE CBC W/AUTO DIFF WBC: CPT

## 2019-10-10 NOTE — ED PSYCHOSOCIAL
General


Chief Complaint:  Psych/Social Disorder


Stated Complaint:  DEPRESSION


Nursing Triage Note:  


States that he has been depressed, paranoid, and anxious for the last 2 months, 


and increasing due to influences of others and their narcotic use.   Does have 


thoughts of self harm and does have a plan to carry it out.  Has been trying to 


reestablish with [facility] but has been unable at this point


Source:  patient


Exam Limitations:  no limitations





History of Present Illness


Date Seen by Provider:  Oct 10, 2019


Time Seen by Provider:  15:30


Initial Comments


The patient presents to ER by private conveyance with chief complaint of 

depression worsening over the past 2-3 weeks. He says is having a hard time 

battling it. He saw Dr. pastrana at [facility] and was started

on lithium very paranoid about his has not started taking it. He has a history 

of depression and suicidal ideation. Presently he is having suicidal ideation 

with plan to overdose on heroin. He would like to go inpatient. He has been 

inpatient psych at [place], [place] before. He has not done anything to attempt to

take his own life today. He says there is no specific instigating incident. He 

recently moved to the area to be called to the family in the past 2 years. He 

says he last used methamphetamines 6 months ago and heroin recently. Used to 

smoke pot regularly. He does still smoke cigarettes 1 pack a day. He denies 

having any pain shortness of breath nausea vomiting fevers chills cough 

shortness of breath diarrhea or constipation.





Allergies and Home Medications


Allergies


Coded Allergies:  


     No Known Drug Allergies (Unverified , 8/25/19)





Patient Home Medication List


Home Medication List Reviewed:  Yes





Review of Systems


Constitutional:  No chills, No fever, No malaise


EENTM:  No ear discharge, No hearing loss


Respiratory:  No cough, No dyspnea on exertion


Cardiovascular:  No chest pain, No palpitations


Gastrointestinal:  No abdominal pain, No nausea


Genitourinary:  No discharge, No dysuria


Musculoskeletal:  No back pain, No gout





Past Medical-Social-Family Hx


Patient Social History


Alcohol Use:  Denies Use


Recreational Drug Use:  Yes


Drug of Choice:  METH REPORTS FORMER USE; Heroin, MJ


Smoking Status:  Current Everyday Smoker


Type Used:  Cigarettes (1 ppd)


2nd Hand Smoke Exposure:  No


Recent Foreign Travel:  No


Contact w/Someone Who Travel:  No


Recent Infectious Disease Expo:  No


Recent Hopitalizations:  No (DANAE FOR PSYCH)





Seasonal Allergies


Seasonal Allergies:  Yes





Past Medical History


Surgeries:  Yes


Prostatectomy


Respiratory:  No


Cardiac:  No


Neurological:  No


Genitourinary:  Yes


Prostate Problems


Gastrointestinal:  No (INGUINAL HERNIA)


Musculoskeletal:  No


Endocrine:  No


HEENT:  No


Cancer:  No


Psychosocial:  Yes ("BIPOLAR TYPE 2, SCHIZOID NO DILUSIONS")


Sleep Difficulties, Anxiety, Bipolar, Depression


Integumentary:  No


Blood Disorders:  No





Physical Exam





Vital Signs - First Documented








 10/10/19





 15:11


 


Temp 37.0


 


Pulse 77


 


Resp 16


 


B/P (MAP) 129/76 (93)


 


Pulse Ox 97





Capillary Refill : Less Than 3 Seconds


Height, Weight, BMI


Height: 5'10.00"


Weight: 150lbs. oz. 68.024465zn; 21.00 BMI


Method:Stated


General Appearance:  WD/WN, no apparent distress


HEENT:  PERRL/EOMI, pharynx normal


Neck:  non-tender, full range of motion, normal inspection


Respiratory:  chest non-tender, lungs clear, normal breath sounds, no 

respiratory distress, no accessory muscle use


Cardiovascular:  normal peripheral pulses, regular rate, rhythm, no edema


Peripheral Pulses:  2+ Radial Pulses (R), 2+ Radial Pulses (L)


Gastrointestinal:  normal bowel sounds, non tender, soft


Neurologic/Psychiatric:  alert, normal mood/affect, oriented x 3


Appearance/Memory:  appropriate appearance, appropriate insight, no memory impai

rment


Behavior/Eye Contact:  cooperative, good eye contact, normal speech


Thoughts/Hallucinations:  no apparent hallucination, paranoid (mildly), other 

(suicidal ideation with plan to OD)


Skin:  normal color, warm/dry





Progress/Results/Core Measures


Results/Orders


Lab Results





Laboratory Tests








Test


 10/10/19


15:58 10/10/19


16:20 10/10/19


16:46 Range/Units


 


 


Urine Color YELLOW     


 


Urine Clarity CLEAR     


 


Urine pH 5    5-9  


 


Urine Specific Gravity 1.020    1.016-1.022  


 


Urine Protein 1+ H   NEGATIVE  


 


Urine Glucose (UA) NEGATIVE    NEGATIVE  


 


Urine Ketones NEGATIVE    NEGATIVE  


 


Urine Nitrite NEGATIVE    NEGATIVE  


 


Urine Bilirubin NEGATIVE    NEGATIVE  


 


Urine Urobilinogen NORMAL    NORMAL  MG/DL


 


Urine Leukocyte Esterase NEGATIVE    NEGATIVE  


 


Urine RBC (Auto) NEGATIVE    NEGATIVE  


 


Urine RBC NONE     /HPF


 


Urine WBC 5-10 H    /HPF


 


Urine Crystals NONE     /LPF


 


Urine Bacteria NEGATIVE     /HPF


 


Urine Casts NONE     /LPF


 


Urine Mucus SMALL H    /LPF


 


Urine Culture Indicated NO     


 


Urine Opiates Screen NEGATIVE    NEGATIVE  


 


Urine Oxycodone Screen NEGATIVE    NEGATIVE  


 


Urine Methadone Screen NEGATIVE    NEGATIVE  


 


Urine Propoxyphene Screen NEGATIVE    NEGATIVE  


 


Urine Barbiturates Screen NEGATIVE    NEGATIVE  


 


Ur Tricyclic Antidepressants


Screen NEGATIVE 


 


 


 NEGATIVE  





 


Urine Phencyclidine Screen NEGATIVE    NEGATIVE  


 


Urine Amphetamines Screen NEGATIVE    NEGATIVE  


 


Urine Methamphetamines Screen NEGATIVE    NEGATIVE  


 


Urine Benzodiazepines Screen NEGATIVE    NEGATIVE  


 


Urine Cocaine Screen NEGATIVE    NEGATIVE  


 


Urine Cannabinoids Screen NEGATIVE    NEGATIVE  


 


White Blood Count


 


 5.8 


 


 4.3-11.0


10^3/uL


 


Red Blood Count


 


 4.26 L


 


 4.35-5.85


10^6/uL


 


Hemoglobin  13.4   13.3-17.7  G/DL


 


Hematocrit  38 L  40-54  %


 


Mean Corpuscular Volume  89   80-99  FL


 


Mean Corpuscular Hemoglobin  32   25-34  PG


 


Mean Corpuscular Hemoglobin


Concent 


 35 


 


 32-36  G/DL





 


Red Cell Distribution Width  13.4   10.0-14.5  %


 


Platelet Count


 


 258 


 


 130-400


10^3/uL


 


Mean Platelet Volume  10.2   7.4-10.4  FL


 


Neutrophils (%) (Auto)  53   42-75  %


 


Lymphocytes (%) (Auto)  33   12-44  %


 


Monocytes (%) (Auto)  10   0-12  %


 


Eosinophils (%) (Auto)  3   0-10  %


 


Basophils (%) (Auto)  1   0-10  %


 


Neutrophils # (Auto)  3.0   1.8-7.8  X 10^3


 


Lymphocytes # (Auto)  1.9   1.0-4.0  X 10^3


 


Monocytes # (Auto)  0.6   0.0-1.0  X 10^3


 


Eosinophils # (Auto)


 


 0.2 


 


 0.0-0.3


10^3/uL


 


Basophils # (Auto)


 


 0.1 


 


 0.0-0.1


10^3/uL


 


Sodium Level  135   135-145  MMOL/L


 


Potassium Level  3.9   3.6-5.0  MMOL/L


 


Chloride Level  105     MMOL/L


 


Carbon Dioxide Level  24   21-32  MMOL/L


 


Anion Gap  6   5-14  MMOL/L


 


Blood Urea Nitrogen  11   7-18  MG/DL


 


Creatinine


 


 0.84 


 


 0.60-1.30


MG/DL


 


Estimat Glomerular Filtration


Rate 


 > 60 


 


  





 


BUN/Creatinine Ratio  13    


 


Glucose Level  100     MG/DL


 


Calcium Level  8.5   8.5-10.1  MG/DL


 


Corrected Calcium  8.7   8.5-10.1  MG/DL


 


Total Bilirubin  0.6   0.1-1.0  MG/DL


 


Aspartate Amino Transf


(AST/SGOT) 


 12 


 


 5-34  U/L





 


Alanine Aminotransferase


(ALT/SGPT) 


 11 


 


 0-55  U/L





 


Alkaline Phosphatase  82     U/L


 


Total Protein  6.2 L  6.4-8.2  GM/DL


 


Albumin  3.8   3.2-4.5  GM/DL


 


Salicylates Level  < 5.0 L  5.0-20.0  MG/DL


 


Acetaminophen Level  < 10 L  10-30  UG/ML


 


Serum Alcohol  < 10   <10  MG/DL








My Orders





Orders - JOLIE OBRIEN


Ua Culture If Indicated (10/10/19 15:40)


Cbc With Automated Diff (10/10/19 15:40)


Comprehensive Metabolic Panel (10/10/19 15:40)


Alcohol (10/10/19 15:40)


Drug Screen Stat (Urine) (10/10/19 15:40)


Acetaminophen (10/10/19 15:40)


Salicylate (10/10/19 15:40)


Ekg Tracing (10/10/19 15:40)


Bh Status Checks/Observation Q15M (10/10/19 15:40)


Ceftriaxone  For Iv Use (Rocephin  For I (10/10/19 16:45)


Syphilis Antibody Screen (10/10/19 16:34)


Neis Josue Dna Urine Test (10/10/19 16:34)


Chlamydia Trachomatis Urine (10/10/19 16:34)


Azithromycin Tablet (Zithromax Tablet) (10/10/19 16:45)


Ceftriaxone For Im Use (Rocephin For Im (10/10/19 17:00)


Lidocaine 1% Inj 20 Ml (Xylocaine 1% Inj (10/10/19 17:00)


General/Regular (10/10/19 Dinner)





Medications Given in ED





Current Medications








 Medications  Dose


 Ordered  Sig/Veronique


 Route  Start Time


 Stop Time Status Last Admin


Dose Admin


 


 Azithromycin  1,000 mg  ONCE  ONCE


 PO  10/10/19 16:45


 10/10/19 16:46 DC 10/10/19 17:17


1,000 MG


 


 Ceftriaxone Sodium  1,000 mg  ONCE  ONCE


 IM  10/10/19 17:00


 10/10/19 17:01 DC 10/10/19 17:17


1,000 MG


 


 Lidocaine HCl  2.1 ml  ONCE  ONCE


 INJ  10/10/19 17:00


 10/10/19 17:01 DC 10/10/19 17:17


2.1 ML








Vital Signs/I&O











 10/10/19





 15:11


 


Temp 37.0


 


Pulse 77


 


Resp 16


 


B/P (MAP) 129/76 (93)


 


Pulse Ox 97














Blood Pressure Mean:                    93











Progress


Progress Note #1:  


   Time:  15:47


Progress Note


Psych workup. In the first urine was lost in the lab. We will encourage by mouth

fluids.


Progress Note #2:  


   Time:  16:35


Progress Note


Moderate pyuria. We will treat him with Rocephin and azithromycin. He can finish

up some Keflex outpatient for another 5 days for possible UTI. We discussed STD 

testing and prevention and he would like us to go ahead and check so we have 

ordered syphilis antibodies, and gonorrhea/chlamydia off the urine.


Progress Note #3:  


   Time:  16:51


Progress Note


[facility] has no beds available and several ER waiting.


Left voicemail with [facility].


[facility] says of it and faxed information to them they will review.


Progress Note #4:  


   Time:  17:48


Progress Note


Discussed the case with the accepting team at [facility] unit and 

they said they will accept him but he was just discharged on 1 October and did 

not participate with group or therapy. They also said that this is the same s

tory he gave then and they strongly suspect he is malingering.


Initial ECG Impression Date:  Oct 10, 2019


Initial ECG Impression Time:  17:15


Initial ECG Rate:  66


Initial ECG Rhythm:  Normal Sinus


Initial ECG Intervals:  Normal


Initial ECG Impression:  Normal


Comment


Normal sinus rhythm without ST elevation or depression.





Departure


Impression





   Primary Impression:  


   Suicidal ideation


   Additional Impression:  


   UTI (urinary tract infection)


   Qualified Codes:  N30.00 - Acute cystitis without hematuria


Disposition:  65 XFER TO PSYCH HOSP/UNIT


Condition:  Stable





Transfer


Time Spoke to Accepting Phy:  17:45


Transfer Progress Notes


[facility] accepted the patient.


Rashmi Rouse plans to arrive between [id number] to transport the patient.


Transfer Facility:  


[place], [place] [facility] unit.


[facility].


Method of Transfer:  Private Vehicle





Departure-Patient Inst.


Referrals:  


NO,LOCAL PHYSICIAN (PCP/Family)


Primary Care Physician











JOLIE OBRIEN                 Oct 10, 2019 15:48

## 2019-10-20 ENCOUNTER — HOSPITAL ENCOUNTER (EMERGENCY)
Dept: HOSPITAL 75 - ER | Age: 64
Discharge: PSYCHIATRIC OR SUBSTANCE USE FACILITY | End: 2019-10-20
Payer: MEDICARE

## 2019-10-20 VITALS — BODY MASS INDEX: 21.71 KG/M2 | WEIGHT: 149.91 LBS | HEIGHT: 69.69 IN

## 2019-10-20 VITALS — DIASTOLIC BLOOD PRESSURE: 83 MMHG | SYSTOLIC BLOOD PRESSURE: 124 MMHG

## 2019-10-20 DIAGNOSIS — F32.9: Primary | ICD-10-CM

## 2019-10-20 DIAGNOSIS — F31.9: ICD-10-CM

## 2019-10-20 DIAGNOSIS — F41.9: ICD-10-CM

## 2019-10-20 DIAGNOSIS — Z98.890: ICD-10-CM

## 2019-10-20 DIAGNOSIS — R45.851: ICD-10-CM

## 2019-10-20 LAB
ALBUMIN SERPL-MCNC: 4.3 GM/DL (ref 3.2–4.5)
ALP SERPL-CCNC: 99 U/L (ref 40–136)
ALT SERPL-CCNC: 20 U/L (ref 0–55)
APAP SERPL-MCNC: < 10 UG/ML (ref 10–30)
BARBITURATES UR QL: NEGATIVE
BASOPHILS # BLD AUTO: 0.1 10^3/UL (ref 0–0.1)
BASOPHILS NFR BLD AUTO: 1 % (ref 0–10)
BENZODIAZ UR QL SCN: NEGATIVE
BILIRUB SERPL-MCNC: 0.3 MG/DL (ref 0.1–1)
BUN/CREAT SERPL: 15
CALCIUM SERPL-MCNC: 9.2 MG/DL (ref 8.5–10.1)
CHLORIDE SERPL-SCNC: 102 MMOL/L (ref 98–107)
CO2 SERPL-SCNC: 22 MMOL/L (ref 21–32)
COCAINE UR QL: NEGATIVE
CREAT SERPL-MCNC: 0.95 MG/DL (ref 0.6–1.3)
EOSINOPHIL # BLD AUTO: 0.5 10^3/UL (ref 0–0.3)
EOSINOPHIL NFR BLD AUTO: 7 % (ref 0–10)
ERYTHROCYTE [DISTWIDTH] IN BLOOD BY AUTOMATED COUNT: 14.5 % (ref 10–14.5)
GFR SERPLBLD BASED ON 1.73 SQ M-ARVRAT: > 60 ML/MIN
GLUCOSE SERPL-MCNC: 106 MG/DL (ref 70–105)
HCT VFR BLD CALC: 44 % (ref 40–54)
HGB BLD-MCNC: 15.1 G/DL (ref 13.3–17.7)
LYMPHOCYTES # BLD AUTO: 2.4 X 10^3 (ref 1–4)
LYMPHOCYTES NFR BLD AUTO: 35 % (ref 12–44)
MANUAL DIFFERENTIAL PERFORMED BLD QL: NO
MCH RBC QN AUTO: 31 PG (ref 25–34)
MCHC RBC AUTO-ENTMCNC: 34 G/DL (ref 32–36)
MCV RBC AUTO: 90 FL (ref 80–99)
METHADONE UR QL SCN: NEGATIVE
METHAMPHETAMINE SCREEN URINE S: NEGATIVE
MONOCYTES # BLD AUTO: 0.7 X 10^3 (ref 0–1)
MONOCYTES NFR BLD AUTO: 10 % (ref 0–12)
NEUTROPHILS # BLD AUTO: 3.3 X 10^3 (ref 1.8–7.8)
NEUTROPHILS NFR BLD AUTO: 48 % (ref 42–75)
OPIATES UR QL SCN: NEGATIVE
OXYCODONE UR QL: NEGATIVE
PLATELET # BLD: 247 10^3/UL (ref 130–400)
PMV BLD AUTO: 10.4 FL (ref 7.4–10.4)
POTASSIUM SERPL-SCNC: 4.8 MMOL/L (ref 3.6–5)
PROPOXYPH UR QL: NEGATIVE
PROT SERPL-MCNC: 7 GM/DL (ref 6.4–8.2)
SALICYLATES SERPL-MCNC: < 5 MG/DL (ref 5–20)
SODIUM SERPL-SCNC: 136 MMOL/L (ref 135–145)
TRICYCLICS UR QL SCN: NEGATIVE
WBC # BLD AUTO: 6.9 10^3/UL (ref 4.3–11)

## 2019-10-20 PROCEDURE — 80306 DRUG TEST PRSMV INSTRMNT: CPT

## 2019-10-20 PROCEDURE — 85025 COMPLETE CBC W/AUTO DIFF WBC: CPT

## 2019-10-20 PROCEDURE — 93005 ELECTROCARDIOGRAM TRACING: CPT

## 2019-10-20 PROCEDURE — 36415 COLL VENOUS BLD VENIPUNCTURE: CPT

## 2019-10-20 PROCEDURE — 80329 ANALGESICS NON-OPIOID 1 OR 2: CPT

## 2019-10-20 PROCEDURE — 80053 COMPREHEN METABOLIC PANEL: CPT

## 2019-10-20 PROCEDURE — 80320 DRUG SCREEN QUANTALCOHOLS: CPT

## 2019-10-20 NOTE — ED NEUROLOGICAL PROBLEM
General


Stated Complaint:  DEPRESSION


Source:  patient


Exam Limitations:  no limitations





History of Present Illness


Date Seen by Provider:  Oct 20, 2019


Time Seen by Provider:  07:05


Initial Comments


A 64-year-old white male was brought to the emergency department with suicidal 

ideation.  Patient was recently hospitalized at the [facility] for psych care.  He has

run out of his psych medications.  The patient has had significant suicidal 

ideation and states that he has a definite plan.





Patient denies associated fever, chills, productive cough, chest pain or 

palpitations, nausea vomiting or diarrhea, headache or stiff neck, or actual 

harm to self.





Allergies and Home Medications


Allergies


Coded Allergies:  


     No Known Drug Allergies (Unverified , 8/25/19)





Home Medications


No Active Prescriptions or Reported Meds





Patient Home Medication List


Home Medication List Reviewed:  Yes





Review of Systems


Review of Systems


Constitutional:  No chills


Eyes:  Denies Blurred Vision


Ears, Nose, Mouth, Throat:  denies ear pain


Respiratory:  No cough


Cardiovascular:  No chest pain


Gastrointestinal:  No abdominal pain, No nausea


Genitourinary:  no symptoms reported


Musculoskeletal:  no symptoms reported


Skin:  no symptoms reported


Psychiatric/Neurological:  See HPI, Depressed, Other (active suicidal ideation.)


Endocrine:  No Symptoms Reported


Hematologic/Lymphatic:  No Symptoms Reported





Past Medical-Social-Family Hx


Past Med/Social Hx:  Reviewed Nursing Past Med/Soc Hx


Patient Social History


Drug of Choice:  METH REPORTS FORMER USE; Heroin, MJ


Type Used:  Cigarettes


2nd Hand Smoke Exposure:  No


Recent Foreign Travel:  No


Contact w/Someone Who Travel:  No


Recent Hopitalizations:  No ([facility] FOR PSYCH)





Seasonal Allergies


Seasonal Allergies:  Yes





Past Medical History


Surgeries:  Yes


Prostatectomy


Respiratory:  No


Cardiac:  No


Neurological:  No


Genitourinary:  Yes


Prostate Problems


Gastrointestinal:  No (INGUINAL HERNIA)


Musculoskeletal:  No


Endocrine:  No


HEENT:  No


Cancer:  No


Psychosocial:  Yes ("BIPOLAR TYPE 2, SCHIZOID NO DILUSIONS")


Sleep Difficulties, Anxiety, Bipolar, Depression


Integumentary:  No


Blood Disorders:  No





Physical Exam


Vital Signs





Vital Signs - First Documented








 10/20/19





 06:48


 


Temp 35.9


 


Pulse 78


 


Resp 18


 


B/P (MAP) 124/83 (97)


 


Pulse Ox 98





Capillary Refill :


Height, Weight, BMI


Height: 5'10.00"


Weight: 150lbs. oz. 68.658725ia; 21.00 BMI


Method:Stated


General Appearance:  WD/WN, other (depressed)


HEENT:  normal ENT inspection


Neck:  full range of motion, supple


Respiratory:  lungs clear


Cardiovascular:  regular rate, rhythm


Gastrointestinal:  normal bowel sounds, soft


Back:  normal inspection


Extremities:  normal range of motion


Neurologic/Psychiatric:  no motor/sensory deficits, alert, depressed affect


Crainal Nerves:  normal hearing, normal speech


Coordination/Gait:  normal gait


Motor/Sensory:  no motor deficit, no sensory deficit


Skin:  normal color, warm/dry





Progress/Results/Core Measures


Results/Orders


Lab Results





Laboratory Tests








Test


 10/20/19


07:11 10/20/19


07:12 Range/Units


 


 


Urine Opiates Screen NEGATIVE   NEGATIVE  


 


Urine Oxycodone Screen NEGATIVE   NEGATIVE  


 


Urine Methadone Screen NEGATIVE   NEGATIVE  


 


Urine Propoxyphene Screen NEGATIVE   NEGATIVE  


 


Urine Barbiturates Screen NEGATIVE   NEGATIVE  


 


Ur Tricyclic Antidepressants


Screen NEGATIVE 


 


 NEGATIVE  





 


Urine Phencyclidine Screen NEGATIVE   NEGATIVE  


 


Urine Amphetamines Screen NEGATIVE   NEGATIVE  


 


Urine Methamphetamines Screen NEGATIVE   NEGATIVE  


 


Urine Benzodiazepines Screen NEGATIVE   NEGATIVE  


 


Urine Cocaine Screen NEGATIVE   NEGATIVE  


 


Urine Cannabinoids Screen NEGATIVE   NEGATIVE  


 


White Blood Count


 


 6.9 


 4.3-11.0


10^3/uL


 


Red Blood Count


 


 4.89 


 4.35-5.85


10^6/uL


 


Hemoglobin  15.1  13.3-17.7  G/DL


 


Hematocrit  44  40-54  %


 


Mean Corpuscular Volume  90  80-99  FL


 


Mean Corpuscular Hemoglobin  31  25-34  PG


 


Mean Corpuscular Hemoglobin


Concent 


 34 


 32-36  G/DL





 


Red Cell Distribution Width  14.5  10.0-14.5  %


 


Platelet Count


 


 247 


 130-400


10^3/uL


 


Mean Platelet Volume  10.4  7.4-10.4  FL


 


Neutrophils (%) (Auto)  48  42-75  %


 


Lymphocytes (%) (Auto)  35  12-44  %


 


Monocytes (%) (Auto)  10  0-12  %


 


Eosinophils (%) (Auto)  7  0-10  %


 


Basophils (%) (Auto)  1  0-10  %


 


Neutrophils # (Auto)  3.3  1.8-7.8  X 10^3


 


Lymphocytes # (Auto)  2.4  1.0-4.0  X 10^3


 


Monocytes # (Auto)  0.7  0.0-1.0  X 10^3


 


Eosinophils # (Auto)


 


 0.5 H


 0.0-0.3


10^3/uL


 


Basophils # (Auto)


 


 0.1 


 0.0-0.1


10^3/uL


 


Sodium Level  136  135-145  MMOL/L


 


Potassium Level  4.8  3.6-5.0  MMOL/L


 


Chloride Level  102    MMOL/L


 


Carbon Dioxide Level  22  21-32  MMOL/L


 


Anion Gap  12  5-14  MMOL/L


 


Blood Urea Nitrogen  14  7-18  MG/DL


 


Creatinine


 


 0.95 


 0.60-1.30


MG/DL


 


Estimat Glomerular Filtration


Rate 


 > 60 


  





 


BUN/Creatinine Ratio  15   


 


Glucose Level  106 H   MG/DL


 


Calcium Level  9.2  8.5-10.1  MG/DL


 


Corrected Calcium  9.0  8.5-10.1  MG/DL


 


Total Bilirubin  0.3  0.1-1.0  MG/DL


 


Aspartate Amino Transf


(AST/SGOT) 


 18 


 5-34  U/L





 


Alanine Aminotransferase


(ALT/SGPT) 


 20 


 0-55  U/L





 


Alkaline Phosphatase  99    U/L


 


Total Protein  7.0  6.4-8.2  GM/DL


 


Albumin  4.3  3.2-4.5  GM/DL


 


Salicylates Level  < 5.0 L 5.0-20.0  MG/DL


 


Acetaminophen Level  < 10 L 10-30  UG/ML


 


Serum Alcohol  < 10  <10  MG/DL








My Orders





Orders - JUN VALENTINE MD


Cbc With Automated Diff (10/20/19 07:00)


Comprehensive Metabolic Panel (10/20/19 07:00)


Salicylate (10/20/19 07:00)


Acetaminophen (10/20/19 07:00)


Alcohol (10/20/19 07:00)


Drug Screen Stat (Urine) (10/20/19 07:00)


Ekg Tracing (10/20/19 07:11)


General/Regular (10/20/19 Breakfast)


General/Regular (10/20/19 Lunch)





Vital Signs/I&O











 10/20/19 10/20/19





 06:48 11:14


 


Temp 35.9 35.9


 


Pulse 78 78


 


Resp 18 18


 


B/P (MAP) 124/83 (97) 124/83 (97)


 


Pulse Ox 98 98











Progress


Progress Note :  


   Time:  18:55


Progress Note


The patient was transferred to [facility] for further psych care.





Departure


Impression





   Primary Impression:  


   Depression


   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

   psychotic features


   Additional Impression:  


   Suicidal ideation


Disposition:  02 XFER SHT-TRM HOSP


Condition:  Unchanged





Departure-Patient Inst.


Referrals:  


NO,LOCAL PHYSICIAN (PCP/Family)


Primary Care Physician


Scripts


No Active Prescriptions or Reported Meds











JUN VALENTINE MD             Oct 20, 2019 07:09

## 2020-01-21 ENCOUNTER — HOSPITAL ENCOUNTER (EMERGENCY)
Dept: HOSPITAL 75 - ER | Age: 65
Discharge: HOME | End: 2020-01-21
Payer: MEDICARE

## 2020-01-21 VITALS — WEIGHT: 170.2 LBS | BODY MASS INDEX: 24.64 KG/M2 | HEIGHT: 69.69 IN

## 2020-01-21 VITALS — SYSTOLIC BLOOD PRESSURE: 111 MMHG | DIASTOLIC BLOOD PRESSURE: 75 MMHG

## 2020-01-21 DIAGNOSIS — F17.210: ICD-10-CM

## 2020-01-21 DIAGNOSIS — F41.9: ICD-10-CM

## 2020-01-21 DIAGNOSIS — R42: Primary | ICD-10-CM

## 2020-01-21 DIAGNOSIS — F31.9: ICD-10-CM

## 2020-01-21 LAB
BASOPHILS # BLD AUTO: 0.1 10^3/UL (ref 0–0.1)
BASOPHILS NFR BLD AUTO: 1 % (ref 0–10)
BUN/CREAT SERPL: 14
CALCIUM SERPL-MCNC: 9.2 MG/DL (ref 8.5–10.1)
CHLORIDE SERPL-SCNC: 105 MMOL/L (ref 98–107)
CO2 SERPL-SCNC: 26 MMOL/L (ref 21–32)
CREAT SERPL-MCNC: 1.18 MG/DL (ref 0.6–1.3)
EOSINOPHIL # BLD AUTO: 0.3 10^3/UL (ref 0–0.3)
EOSINOPHIL NFR BLD AUTO: 4 % (ref 0–10)
ERYTHROCYTE [DISTWIDTH] IN BLOOD BY AUTOMATED COUNT: 13.5 % (ref 10–14.5)
GFR SERPLBLD BASED ON 1.73 SQ M-ARVRAT: > 60 ML/MIN
GLUCOSE SERPL-MCNC: 105 MG/DL (ref 70–105)
HCT VFR BLD CALC: 46 % (ref 40–54)
HGB BLD-MCNC: 15.9 G/DL (ref 13.3–17.7)
LYMPHOCYTES # BLD AUTO: 1.7 X 10^3 (ref 1–4)
LYMPHOCYTES NFR BLD AUTO: 25 % (ref 12–44)
MANUAL DIFFERENTIAL PERFORMED BLD QL: NO
MCH RBC QN AUTO: 30 PG (ref 25–34)
MCHC RBC AUTO-ENTMCNC: 35 G/DL (ref 32–36)
MCV RBC AUTO: 88 FL (ref 80–99)
MONOCYTES # BLD AUTO: 0.5 X 10^3 (ref 0–1)
MONOCYTES NFR BLD AUTO: 7 % (ref 0–12)
NEUTROPHILS # BLD AUTO: 4.3 X 10^3 (ref 1.8–7.8)
NEUTROPHILS NFR BLD AUTO: 63 % (ref 42–75)
PLATELET # BLD: 253 10^3/UL (ref 130–400)
PMV BLD AUTO: 9.5 FL (ref 7.4–10.4)
POTASSIUM SERPL-SCNC: 4.2 MMOL/L (ref 3.6–5)
SODIUM SERPL-SCNC: 139 MMOL/L (ref 135–145)
WBC # BLD AUTO: 6.7 10^3/UL (ref 4.3–11)

## 2020-01-21 PROCEDURE — 36415 COLL VENOUS BLD VENIPUNCTURE: CPT

## 2020-01-21 PROCEDURE — 85025 COMPLETE CBC W/AUTO DIFF WBC: CPT

## 2020-01-21 PROCEDURE — 71046 X-RAY EXAM CHEST 2 VIEWS: CPT

## 2020-01-21 PROCEDURE — 80048 BASIC METABOLIC PNL TOTAL CA: CPT

## 2020-01-21 NOTE — NUR
Upon taking pt to room, pt reports additional symptoms.  Pt reports being sober 
from smoking meth for 8 months.  Pt is "fearful I have done some damage to my 
lungs and have caused lung cancer."  Pt reports hoarse voice and severe 
fatigue.  Pt also reports hx of anxiety and feeling anxious at this time.

## 2020-01-21 NOTE — DIAGNOSTIC IMAGING REPORT
INDICATION: 

Loss of balance, dizziness, hoarseness. 



FINDINGS: 

The lungs are clear. The heart and vessels are normal. There is

no effusion or pneumothorax. The hilar and mediastinal contours

are normal. 



IMPRESSION: 

No acute appearing abnormality.



Dictated by: 



  Dictated on workstation # [id number]

## 2020-01-21 NOTE — ED GENERAL
General


Chief Complaint:  Dizziness/Syncope


Stated Complaint:  DIZZINESS


Nursing Triage Note:  


VERTIGO X5 DAYS.


Nursing Sepsis Screen:  No Definite Risk


Source of Information:  Patient


Exam Limitations:  No Limitations





History of Present Illness


Date Seen by Provider:  Jan 21, 2020


Time Seen by Provider:  13:02


Initial Comments


To ER with sore throat, vertigo upon standing for 2 months. He has a lot of 

anxiety, states that he used to smoke methamphetamine greater than 8 months ago 

and is afraid that he is done irreversible damage to himself. He also thinks he 

might be anemic.


Timing/Duration:  Other (months)


Severity:  Moderate


Associated Systoms:  Cough





Allergies and Home Medications


Allergies


Coded Allergies:  


     No Known Drug Allergies (Unverified , 8/25/19)





Patient Home Medication List


Home Medication List Reviewed:  Yes





Review of Systems


Review of Systems


Constitutional:  see HPI, dizziness


EENTM:  see HPI


Respiratory:  no symptoms reported


Cardiovascular:  no symptoms reported


Genitourinary:  no symptoms reported


Musculoskeletal:  no symptoms reported


Skin:  no symptoms reported


Psychiatric/Neurological:  No Symptoms Reported


Hematologic/Lymphatic:  No Symptoms Reported


Immunological/Allergic:  no symptoms reported





Past Medical-Social-Family Hx


Patient Social History


Alcohol Use:  Denies Use


Recreational Drug Use:  Yes (PAST HX OF SMOKING METH)


Drug of Choice:  METH REPORTS FORMER USE; Heroin, MJ


Smoking Status:  Current Everyday Smoker


Type Used:  Cigarettes


2nd Hand Smoke Exposure:  No


Recent Foreign Travel:  No


Contact w/Someone Who Travel:  No


Recent Infectious Disease Expo:  No


Recent Hopitalizations:  No





Seasonal Allergies


Seasonal Allergies:  Yes





Past Medical History


Surgeries:  Yes


Prostatectomy


Respiratory:  No


Cardiac:  No


Neurological:  No


Genitourinary:  Yes


Prostate Problems


Gastrointestinal:  No (INGUINAL HERNIA)


Musculoskeletal:  No


Endocrine:  No


HEENT:  No


Cancer:  No


Psychosocial:  Yes ("BIPOLAR TYPE 2, SCHIZOID NO DILUSIONS")


Sleep Difficulties, Anxiety, Bipolar, Depression


Integumentary:  No


Blood Disorders:  No





Physical Exam


Vital Signs





Vital Signs - First Documented








 1/21/20





 11:59


 


Temp 36.7


 


Pulse 82


 


Resp 16


 


B/P (MAP) 73/


 


Pulse Ox 98


 


O2 Delivery Room Air





Capillary Refill : Less Than 3 Seconds


Height, Weight, BMI


Height: 5'10.00"


Weight: 150lbs. oz. 68.709160ao; 24.00 BMI


Method:Stated


General Appearance:  No Apparent Distress, WD/WN


Eyes:  Bilateral Eye Normal Inspection, Bilateral Eye PERRL, Bilateral Eye EOMI


HEENT:  PERRL/EOMI, TMs Normal


Neck:  Full Range of Motion, Normal Inspection


Respiratory:  Lungs Clear, Normal Breath Sounds, No Accessory Muscle Use, No 

Respiratory Distress


Cardiovascular:  Regular Rate, Rhythm, Normal Peripheral Pulses


Gastrointestinal:  Non Tender, Soft


Extremity:  Normal Capillary Refill, Normal Inspection


Neurologic/Psychiatric:  Alert, Oriented x3


Skin:  Normal Color, Warm/Dry





Progress/Results/Core Measures


Suspected Sepsis


Recent Fever Within 48 Hours:  No


Infection Criteria Present:  None


New/Unexplained  Altered Menta:  No


Sepsis Screen:  No Definite Risk


SIRS


Temperature: 


Pulse: 82 


Respiratory Rate: 16


 


Laboratory Tests


1/21/20 13:00: White Blood Count 6.7


Blood Pressure 73 / 


Mean: 


 











Laboratory Tests


1/21/20 13:00: 


Creatinine 1.18, Platelet Count 253





Results/Orders


Lab Results





Laboratory Tests








Test


 1/21/20


13:00 Range/Units


 


 


White Blood Count


 6.7 


 4.3-11.0


10^3/uL


 


Red Blood Count


 5.23 


 4.35-5.85


10^6/uL


 


Hemoglobin 15.9  13.3-17.7  G/DL


 


Hematocrit 46  40-54  %


 


Mean Corpuscular Volume 88  80-99  FL


 


Mean Corpuscular Hemoglobin 30  25-34  PG


 


Mean Corpuscular Hemoglobin


Concent 35 


 32-36  G/DL





 


Red Cell Distribution Width 13.5  10.0-14.5  %


 


Platelet Count


 253 


 130-400


10^3/uL


 


Mean Platelet Volume 9.5  7.4-10.4  FL


 


Neutrophils (%) (Auto) 63  42-75  %


 


Lymphocytes (%) (Auto) 25  12-44  %


 


Monocytes (%) (Auto) 7  0-12  %


 


Eosinophils (%) (Auto) 4  0-10  %


 


Basophils (%) (Auto) 1  0-10  %


 


Neutrophils # (Auto) 4.3  1.8-7.8  X 10^3


 


Lymphocytes # (Auto) 1.7  1.0-4.0  X 10^3


 


Monocytes # (Auto) 0.5  0.0-1.0  X 10^3


 


Eosinophils # (Auto)


 0.3 


 0.0-0.3


10^3/uL


 


Basophils # (Auto)


 0.1 


 0.0-0.1


10^3/uL


 


Sodium Level 139  135-145  MMOL/L


 


Potassium Level 4.2  3.6-5.0  MMOL/L


 


Chloride Level 105    MMOL/L


 


Carbon Dioxide Level 26  21-32  MMOL/L


 


Anion Gap 8  5-14  MMOL/L


 


Blood Urea Nitrogen 17  7-18  MG/DL


 


Creatinine


 1.18 


 0.60-1.30


MG/DL


 


Estimat Glomerular Filtration


Rate > 60 


  





 


BUN/Creatinine Ratio 14   


 


Glucose Level 105    MG/DL


 


Calcium Level 9.2  8.5-10.1  MG/DL








My Orders





Orders - PARKER BELLAMY


Chest Pa/Lat (2 View) (1/21/20 12:33)


Cbc With Automated Diff (1/21/20 12:41)


Basic Metabolic Panel (1/21/20 12:41)


Hydroxyzine Cap/Tab (Vistaril) (1/21/20 13:00)





Medications Given in ED





Current Medications








 Medications  Dose


 Ordered  Sig/Veronique


 Route  Start Time


 Stop Time Status Last Admin


Dose Admin


 


 Hydroxyzine


 Pamoate  50 mg  ONCE  ONCE


 PO  1/21/20 13:00


 1/21/20 13:01 DC 1/21/20 13:14


50 MG








Vital Signs/I&O











 1/21/20





 11:59


 


Temp 36.7


 


Pulse 82


 


Resp 16


 


B/P (MAP) 73/


 


Pulse Ox 98


 


O2 Delivery Room Air





Capillary Refill : Less Than 3 Seconds





Departure


Communication (Admissions)


Dizziness has been present for 2 months, if he fails to improve with meclizine 

then he will need follow-up outpatient MRI to evaluate for a cerebellar lesion. 

There is no nystagmus, he is able to walk from the waiting room to room 5 steady

gait and no use of assistive device. Further workup done outpatient.





Impression





   Primary Impression:  


   Vertigo


Disposition:  01 HOME, SELF-CARE


Condition:  Stable





Departure-Patient Inst.


Decision time for Depature:  13:04


Referrals:  


NO,LOCAL PHYSICIAN (PCP/Family)


Primary Care Physician


Patient Instructions:  Vertigo (a Type of Dizziness) (DC)





Add. Discharge Instructions:  


1. Follow-up with your community health provider for any other nonemergent 

healthcare needs. Take the medicine for dizziness as needed, follow-up with 

health in the next week or 2. If symptoms fail to improve then he will need an 

MRI of the brain


Scripts


Meclizine HCl (Meclizine HCl) 25 Mg Tablet


25 MG PO TID PRN for DIZZINESS, #14 TAB


   Prov: PARKER BELLAMY         1/21/20











PARKER BELLAMY             Jan 21, 2020 13:05

## 2020-05-25 ENCOUNTER — HOSPITAL ENCOUNTER (EMERGENCY)
Dept: HOSPITAL 75 - ER | Age: 65
Discharge: HOME | End: 2020-05-25
Payer: MEDICARE

## 2020-05-25 VITALS
HEIGHT: 70.08 IN | SYSTOLIC BLOOD PRESSURE: 132 MMHG | BODY MASS INDEX: 24.37 KG/M2 | DIASTOLIC BLOOD PRESSURE: 73 MMHG | WEIGHT: 170.2 LBS

## 2020-05-25 DIAGNOSIS — F15.90: Primary | ICD-10-CM

## 2020-05-25 DIAGNOSIS — F17.210: ICD-10-CM

## 2020-05-25 DIAGNOSIS — F22: ICD-10-CM

## 2020-05-25 LAB
ALBUMIN SERPL-MCNC: 3.6 GM/DL (ref 3.2–4.5)
ALP SERPL-CCNC: 102 U/L (ref 40–136)
ALT SERPL-CCNC: 15 U/L (ref 0–55)
APTT PPP: YELLOW S
BACTERIA #/AREA URNS HPF: (no result) /HPF
BARBITURATES UR QL: NEGATIVE
BASOPHILS # BLD AUTO: 0.1 10^3/UL (ref 0–0.1)
BASOPHILS NFR BLD AUTO: 1 % (ref 0–10)
BENZODIAZ UR QL SCN: NEGATIVE
BILIRUB SERPL-MCNC: 0.3 MG/DL (ref 0.1–1)
BILIRUB UR QL STRIP: NEGATIVE
BUN/CREAT SERPL: 14
CALCIUM SERPL-MCNC: 8.5 MG/DL (ref 8.5–10.1)
CHLORIDE SERPL-SCNC: 109 MMOL/L (ref 98–107)
CO2 SERPL-SCNC: 19 MMOL/L (ref 21–32)
COCAINE UR QL: NEGATIVE
CREAT SERPL-MCNC: 0.96 MG/DL (ref 0.6–1.3)
EOSINOPHIL # BLD AUTO: 0.4 10^3/UL (ref 0–0.3)
EOSINOPHIL NFR BLD AUTO: 8 % (ref 0–10)
ERYTHROCYTE [DISTWIDTH] IN BLOOD BY AUTOMATED COUNT: 13.7 % (ref 10–14.5)
FIBRINOGEN PPP-MCNC: CLEAR MG/DL
GFR SERPLBLD BASED ON 1.73 SQ M-ARVRAT: > 60 ML/MIN
GLUCOSE SERPL-MCNC: 116 MG/DL (ref 70–105)
GLUCOSE UR STRIP-MCNC: NEGATIVE MG/DL
HCT VFR BLD CALC: 38 % (ref 40–54)
HGB BLD-MCNC: 13.1 G/DL (ref 13.3–17.7)
KETONES UR QL STRIP: NEGATIVE
LEUKOCYTE ESTERASE UR QL STRIP: NEGATIVE
LYMPHOCYTES # BLD AUTO: 2.3 X 10^3 (ref 1–4)
LYMPHOCYTES NFR BLD AUTO: 43 % (ref 12–44)
MANUAL DIFFERENTIAL PERFORMED BLD QL: NO
MCH RBC QN AUTO: 31 PG (ref 25–34)
MCHC RBC AUTO-ENTMCNC: 34 G/DL (ref 32–36)
MCV RBC AUTO: 90 FL (ref 80–99)
METHADONE UR QL SCN: NEGATIVE
METHAMPHETAMINE SCREEN URINE S: NEGATIVE
MONOCYTES # BLD AUTO: 0.6 X 10^3 (ref 0–1)
MONOCYTES NFR BLD AUTO: 12 % (ref 0–12)
NEUTROPHILS # BLD AUTO: 2 X 10^3 (ref 1.8–7.8)
NEUTROPHILS NFR BLD AUTO: 36 % (ref 42–75)
NITRITE UR QL STRIP: NEGATIVE
OPIATES UR QL SCN: NEGATIVE
OXYCODONE UR QL: NEGATIVE
PH UR STRIP: 6.5 [PH] (ref 5–9)
PLATELET # BLD: 231 10^3/UL (ref 130–400)
PMV BLD AUTO: 10.8 FL (ref 7.4–10.4)
POTASSIUM SERPL-SCNC: 4.2 MMOL/L (ref 3.6–5)
PROPOXYPH UR QL: NEGATIVE
PROT SERPL-MCNC: 6.1 GM/DL (ref 6.4–8.2)
PROT UR QL STRIP: NEGATIVE
RBC #/AREA URNS HPF: (no result) /HPF
SODIUM SERPL-SCNC: 141 MMOL/L (ref 135–145)
SP GR UR STRIP: <=1.005 (ref 1.02–1.02)
TRICYCLICS UR QL SCN: NEGATIVE
WBC # BLD AUTO: 5.4 10^3/UL (ref 4.3–11)
WBC #/AREA URNS HPF: (no result) /HPF

## 2020-05-25 PROCEDURE — 81000 URINALYSIS NONAUTO W/SCOPE: CPT

## 2020-05-25 PROCEDURE — 80306 DRUG TEST PRSMV INSTRMNT: CPT

## 2020-05-25 PROCEDURE — 96374 THER/PROPH/DIAG INJ IV PUSH: CPT

## 2020-05-25 PROCEDURE — 85025 COMPLETE CBC W/AUTO DIFF WBC: CPT

## 2020-05-25 PROCEDURE — 80053 COMPREHEN METABOLIC PANEL: CPT

## 2020-05-25 PROCEDURE — 36415 COLL VENOUS BLD VENIPUNCTURE: CPT

## 2020-05-25 NOTE — ED GENERAL
General


Chief Complaint:  Allergic Reaction


Stated Complaint:  ALLERGY ATTACK


Source of Information:  Patient


Exam Limitations:  No Limitations





History of Present Illness


Date Seen by Provider:  May 25, 2020


Time Seen by Provider:  21:08


Initial Comments


Per EMS from home with reports of an allergic reaction to "atomic weapons". He 

used methamphetamine most recently yesterday he states. Reports that he's itchy 

all over. EMS gave 50 mg of Benadryl in the hospital.


Timing/Duration:  1-2 Days


Severity:  Moderate





Allergies and Home Medications


Allergies


Coded Allergies:  


     No Known Drug Allergies (Unverified , 8/25/19)





Patient Home Medication List


Home Medication List Reviewed:  Yes





Review of Systems


Review of Systems


Constitutional:  see HPI


EENTM:  see HPI


Respiratory:  no symptoms reported


Cardiovascular:  no symptoms reported


Genitourinary:  no symptoms reported


Musculoskeletal:  no symptoms reported


Skin:  no symptoms reported


Psychiatric/Neurological:  See HPI


Hematologic/Lymphatic:  No Symptoms Reported


Immunological/Allergic:  no symptoms reported





Past Medical-Social-Family Hx


Patient Social History


Alcohol Use:  Occasionally Uses


Recreational Drug Use:  Yes


Drug of Choice:  meth


Smoking Status:  Current Everyday Smoker


Type Used:  Cigarettes


2nd Hand Smoke Exposure:  No


Recent Hopitalizations:  No





Immunizations Up To Date


Tetanus Booster (TDap):  Unknown





Seasonal Allergies


Seasonal Allergies:  Yes





Past Medical History


Surgeries:  Yes


Prostatectomy


Respiratory:  No


Cardiac:  No


Neurological:  No


Genitourinary:  Yes


Prostate Problems


Gastrointestinal:  No (INGUINAL HERNIA)


Musculoskeletal:  No


Endocrine:  No


HEENT:  No


Cancer:  No


Psychosocial:  Yes ("BIPOLAR TYPE 2, SCHIZOID NO DILUSIONS")


Sleep Difficulties, Anxiety, Bipolar, Depression


Integumentary:  No


Blood Disorders:  No





Physical Exam


Vital Signs


Capillary Refill :


Height, Weight, BMI


Height: 5'10.00"


Weight: 150lbs. oz. 68.623757pb; 24.00 BMI


Method:Stated


General Appearance:  No Apparent Distress, WD/WN, Other (alert and oriented, 

knows he is [facility] years 2020. He does have some delusions stating 

that his allergic reaction tonight is to atomic weapons)


Eyes:  Bilateral Eye Normal Inspection, Bilateral Eye PERRL, Bilateral Eye EOMI


Neck:  Full Range of Motion, Normal Inspection


Respiratory:  No Accessory Muscle Use, No Respiratory Distress


Cardiovascular:  Normal Peripheral Pulses


Extremity:  Normal Capillary Refill, Normal Inspection


Neurologic/Psychiatric:  Alert, Other (delusional thinking, nonsensical 

statements and lack of coherence of statements)


Skin:  Normal Color, Warm/Dry





Progress/Results/Core Measures


Suspected Sepsis


SIRS


Temperature: 


Pulse:  


Respiratory Rate: 


 


Blood Pressure  / 


Mean:





Results/Orders


My Orders





Orders - PARKER BELLAMY


Olanzapine Orally Dissolve Tab (Zyprexa (5/25/20 20:53)


Cbc With Automated Diff (5/25/20 21:07)


Comprehensive Metabolic Panel (5/25/20 21:07)


Ua Culture If Indicated (5/25/20 21:07)


Drug Screen Stat (Urine) (5/25/20 21:07)


Olanzapine Orally Dissolve Tab (Zyprexa (5/25/20 21:15)





Medications Given in ED





Current Medications








 Medications  Dose


 Ordered  Sig/Veronique


 Route  Start Time


 Stop Time Status Last Admin


Dose Admin


 


 Olanzapine  5 mg  STK-MED ONCE


 .ROUTE  5/25/20 20:53


 5/25/20 21:01 DC 5/25/20 21:01


5 MG








Vital Signs/I&O


Capillary Refill :





Departure


Impression





   Primary Impression:  


   Methamphetamine use


   Additional Impression:  


   Delusions


Disposition:  01 HOME, SELF-CARE


Condition:  Stable





Departure-Patient Inst.


Referrals:  


NO,LOCAL PHYSICIAN (PCP/Family)


Primary Care Physician











PARKER BELLAMY             May 25, 2020 21:10

## 2020-05-25 NOTE — XMS REPORT
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

ATIVE            

NEGATIVE        

 

                                        Automated urine sediment erythrocyte cou

nt by microscopy (number/high power 

field)                    NONE                      NRG        

 

                                        Automated urine sediment leukocyte count

 by microscopy (number/high power field)

                          NONE                      NRG        

 

                          Bacteria detection in urine sediment by light microsco

py           NEGATIVE     

                                        NRG        

 

                                        Squamous epithelial cells detection in u

rine sediment by light microscopy       

                          RARE                      NRG        

 

                          Crystals detection in urine sediment by light microsco

py           NONE         

                                        NRG        

 

                    Casts detection in urine sediment by light microscopy       

    NONE                

NRG        

 

                          Mucus detection in urine sediment by light microscopy 

          MODERATE        

                                        NRG        

 

                    Complete urinalysis with reflex to culture           NO     

             NRG        

 

                                        Complete blood count (CBC) with automate

d white blood cell (WBC) differential - 

19 11:57         

 

                          Blood leukocytes automated count (number/volume)      

     4.6 10*3/uL          

                                        4.3-11.0        

 

                          Blood erythrocytes automated count (number/volume)    

       4.82 10*6/uL       

                                        4.35-5.85        

 

                    Venous blood hemoglobin measurement (mass/volume)           

14.9 g/dL           

13.3-17.7        

 

                    Blood hematocrit (volume fraction)           43 %           

     40-54        

 

                    Automated erythrocyte mean corpuscular volume           89 [

foz_us]           

80-99        

 

                                        Automated erythrocyte mean corpuscular h

emoglobin (mass per erythrocyte)        

                          31 pg                     25-34        

 

                                        Automated erythrocyte mean corpuscular h

emoglobin concentration measurement 

(mass/volume)             35 g/dL                   32-36        

 

                    Automated erythrocyte distribution width ratio           12.

8 %              10.0-

14.5        

 

                    Automated blood platelet count (count/volume)           239 

10*3/uL           

130-400        

 

                          Automated blood platelet mean volume measurement      

     10.7 [foz_us]        

                                        7.4-10.4        

 

                    Automated blood neutrophils/100 leukocytes           59 %   

             42-75       

 

 

                    Automated blood lymphocytes/100 leukocytes           29 %   

             12-44       

 

 

                    Blood monocytes/100 leukocytes           10 %               

 0-12        

 

                    Automated blood eosinophils/100 leukocytes           2 %    

             0-10        

 

                    Automated blood basophils/100 leukocytes           1 %      

           0-10        

 

                    Blood neutrophils automated count (number/volume)           

2.7 10*3            

1.8-7.8        

 

                    Blood lymphocytes automated count (number/volume)           

1.3 10*3            

1.0-4.0        

 

                    Blood monocytes automated count (number/volume)           0.

5 10*3            

0.0-1.0        

 

                    Automated eosinophil count           0.1 10*3/uL           0

.0-0.3        

 

                    Automated blood basophil count (count/volume)           0.0 

10*3/uL           

0.0-0.1        

 

                                        Comprehensive metabolic panel - 19

 11:57         

 

                          Serum or plasma sodium measurement (moles/volume)     

      133 mmol/L          

                                        135-145        

 

                          Serum or plasma potassium measurement (moles/volume)  

         4.4 mmol/L       

                                        3.6-5.0        

 

                          Serum or plasma chloride measurement (moles/volume)   

        102 mmol/L        

                                                

 

                    Carbon dioxide           22 mmol/L           21-32        

 

                          Serum or plasma anion gap determination (moles/volume)

           9 mmol/L       

                                        5-14        

 

                          Serum or plasma urea nitrogen measurement (mass/volume

)           13 mg/dL      

                                        7-18        

 

                          Serum or plasma creatinine measurement (mass/volume)  

         0.99 mg/dL       

                                        0.60-1.30        

 

                    Serum or plasma urea nitrogen/creatinine mass ratio         

  13                  NRG 

       

 

                                        Serum or plasma creatinine measurement w

ith calculation of estimated glomerular 

filtration rate           >                         NRG        

 

                    Serum or plasma glucose measurement (mass/volume)           

116 mg/dL           

        

 

                    Serum or plasma calcium measurement (mass/volume)           

9.1 mg/dL           

8.5-10.1        

 

                          Serum or plasma total bilirubin measurement (mass/volu

me)           0.4 mg/dL   

                                        0.1-1.0        

 

                                        Serum or plasma alkaline phosphatase gary

surement (enzymatic activity/volume)    

                          92 U/L                            

 

                                        Serum or plasma aspartate aminotransfera

se measurement (enzymatic 

activity/volume)           11 U/L                    5-34        

 

                                        Serum or plasma alanine aminotransferase

 measurement (enzymatic activity/volume)

                          10 U/L                    0-55        

 

                    Serum or plasma protein measurement (mass/volume)           

6.9 g/dL            

6.4-8.2        

 

                    Serum or plasma albumin measurement (mass/volume)           

4.3 g/dL            

3.2-4.5        

 

                    CALCIUM CORRECTED           8.9 mg/dL           8.5-10.1    

    

 

                                        Serum or plasma salicylates measurement 

(mass/volume) - 19 11:57         

 

                          Serum or plasma salicylates measurement (mass/volume) 

          < mg/dL         

                                        5.0-20.0        

 

                                        Serum or plasma acetaminophen measuremen

t (mass/volume) - 19 11:57        

 

 

                          Serum or plasma acetaminophen measurement (mass/volume

)           < ug/mL       

                                        10-30        

 

                                        Serum or plasma ethanol measurement (mas

s/volume) - 19 11:57         

 

                    Serum or plasma ethanol measurement (mass/volume)           

< mg/dL             

<10        

 

                                        Lipid Panel - 19 05:21         

 

                    C/HDL               4.5                 3.7-6.7        

 

                    Cholesterol           157 mg/dL           100-240        

 

                    HDL                 35 mg/dL            30-85        

 

                    LDL-Calculated           98 mg/dL            0-100        

 

                    Trig                121 mg/dL                   

 

                    VLDL                24 mg/dL            0-42        

 

                                        Complete urinalysis with reflex to cultu

re - 19 12:03         

 

                    Urine color determination           YELLOW              NRG 

       

 

                    Urine clarity determination           SLIGHTLY CLOUDY       

     NRG        

 

                    Urine pH measurement by test strip           5              

     5-9        

 

                    Specific gravity of urine by test strip           1.025     

          1.016-1.022  

      

 

                    Urine protein assay by test strip, semi-quantitative        

   2+                  

NEGATIVE        

 

                    Urine glucose detection by automated test strip           NE

GATIVE            

NEGATIVE        

 

                          Erythrocytes detection in urine sediment by light micr

oscopy           5+       

                                        NEGATIVE        

 

                    Urine ketones detection by automated test strip           2+

                  NEGATIVE

        

 

                    Urine nitrite detection by test strip           NEGATIVE    

        NEGATIVE    

    

 

                    Urine total bilirubin detection by test strip           NEGA

TIVE            

NEGATIVE        

 

                          Urine urobilinogen measurement by automated test strip

 (mass/volume)           

NORMAL                                  NORMAL        

 

                    Urine leukocyte esterase detection by dipstick           1+ 

                 NEGATIVE 

       

 

                                        Automated urine sediment erythrocyte cou

nt by microscopy (number/high power 

field)                    NONE                      NRG        

 

                                        Automated urine sediment leukocyte count

 by microscopy (number/high power field)

                           [HPF]                    NRG        

 

                          Bacteria detection in urine sediment by light microsco

py           MODERATE     

                                        NRG        

 

                          Crystals detection in urine sediment by light microsco

py           NONE         

                                        NRG        

 

                    Casts detection in urine sediment by light microscopy       

    NONE                

NRG        

 

                          Mucus detection in urine sediment by light microscopy 

          MODERATE        

                                        NRG        

 

                    Complete urinalysis with reflex to culture           YES    

             NRG        

 

                                        Urine drug screening test - 19 12:

03         

 

                    Urine phencyclidine detection by screening method           

NEGATIVE            

NEGATIVE        

 

                          Urine benzodiazepines detection by screening method   

        NEGATIVE          

                                        NEGATIVE        

 

                    Urine cocaine detection           NEGATIVE            NEGATI

VE        

 

                    Urine amphetamines detection by screening method           N

EGATIVE            

NEGATIVE        

 

                          Urine methamphetamine detection by screening method   

        NEGATIVE          

                                        NEGATIVE        

 

                    Urine cannabinoids detection by screening method           N

EGATIVE            

NEGATIVE        

 

                    Urine opiates detection by screening method           NEGATI

VE            

NEGATIVE        

 

                    Urine barbiturates detection           NEGATIVE            N

EGATIVE        

 

                          Screening urine tricyclic antidepressants detection   

        NEGATIVE          

                                        NEGATIVE        

 

                    Urine methadone detection by screening method           NEGA

TIVE            

NEGATIVE        

 

                    Urine oxycodone detection           NEGATIVE            NEGA

TIVE        

 

                    Urine propoxyphene detection           NEGATIVE            N

EGATIVE        

 

                                        Complete blood count (CBC) with automate

d white blood cell (WBC) differential - 

19 12:30         

 

                          Blood leukocytes automated count (number/volume)      

     7.9 10*3/uL          

                                        4.3-11.0        

 

                          Blood erythrocytes automated count (number/volume)    

       5.09 10*6/uL       

                                        4.35-5.85        

 

                    Venous blood hemoglobin measurement (mass/volume)           

15.6 g/dL           

13.3-17.7        

 

                    Blood hematocrit (volume fraction)           45 %           

     40-54        

 

                    Automated erythrocyte mean corpuscular volume           87 [

foz_us]           

80-99        

 

                                        Automated erythrocyte mean corpuscular h

emoglobin (mass per erythrocyte)        

                          31 pg                     25-34        

 

                                        Automated erythrocyte mean corpuscular h

emoglobin concentration measurement 

(mass/volume)             35 g/dL                   32-36        

 

                    Automated erythrocyte distribution width ratio           13.

0 %              10.0-

14.5        

 

                    Automated blood platelet count (count/volume)           268 

10*3/uL           

130-400        

 

                          Automated blood platelet mean volume measurement      

     10.4 [foz_us]        

                                        7.4-10.4        

 

                    Automated blood neutrophils/100 leukocytes           62 %   

             42-75       

 

 

                    Automated blood lymphocytes/100 leukocytes           26 %   

             12-44       

 

 

                    Blood monocytes/100 leukocytes           10 %               

 0-12        

 

                    Automated blood eosinophils/100 leukocytes           2 %    

             0-10        

 

                    Automated blood basophils/100 leukocytes           1 %      

           0-10        

 

                    Blood neutrophils automated count (number/volume)           

4.9 10*3            

1.8-7.8        

 

                    Blood lymphocytes automated count (number/volume)           

2.0 10*3            

1.0-4.0        

 

                    Blood monocytes automated count (number/volume)           0.

8 10*3            

0.0-1.0        

 

                    Automated eosinophil count           0.2 10*3/uL           0

.0-0.3        

 

                    Automated blood basophil count (count/volume)           0.1 

10*3/uL           

0.0-0.1        

 

                                        Comprehensive metabolic panel - 19

 12:30         

 

                          Serum or plasma sodium measurement (moles/volume)     

      137 mmol/L          

                                        135-145        

 

                          Serum or plasma potassium measurement (moles/volume)  

         4.2 mmol/L       

                                        3.6-5.0        

 

                          Serum or plasma chloride measurement (moles/volume)   

        105 mmol/L        

                                                

 

                    Carbon dioxide           19 mmol/L           21-32        

 

                          Serum or plasma anion gap determination (moles/volume)

           13 mmol/L      

                                        5-14        

 

                          Serum or plasma urea nitrogen measurement (mass/volume

)           21 mg/dL      

                                        7-18        

 

                          Serum or plasma creatinine measurement (mass/volume)  

         1.00 mg/dL       

                                        0.60-1.30        

 

                    Serum or plasma urea nitrogen/creatinine mass ratio         

  21                  NRG 

       

 

                                        Serum or plasma creatinine measurement w

ith calculation of estimated glomerular 

filtration rate           >                         NRG        

 

                    Serum or plasma glucose measurement (mass/volume)           

102 mg/dL           

        

 

                    Serum or plasma calcium measurement (mass/volume)           

9.2 mg/dL           

8.5-10.1        

 

                          Serum or plasma total bilirubin measurement (mass/volu

me)           0.8 mg/dL   

                                        0.1-1.0        

 

                                        Serum or plasma alkaline phosphatase gary

surement (enzymatic activity/volume)    

                          102 U/L                           

 

                                        Serum or plasma aspartate aminotransfera

se measurement (enzymatic 

activity/volume)           16 U/L                    5-34        

 

                                        Serum or plasma alanine aminotransferase

 measurement (enzymatic activity/volume)

                          11 U/L                    0-55        

 

                    Serum or plasma protein measurement (mass/volume)           

7.0 g/dL            

6.4-8.2        

 

                    Serum or plasma albumin measurement (mass/volume)           

4.4 g/dL            

3.2-4.5        

 

                    CALCIUM CORRECTED           8.9 mg/dL           8.5-10.1    

    

 

                                        Serum or plasma salicylates measurement 

(mass/volume) - 19 12:30         

 

                          Serum or plasma salicylates measurement (mass/volume) 

          < mg/dL         

                                        5.0-20.0        

 

                                        Serum or plasma acetaminophen measuremen

t (mass/volume) - 19 12:30        

 

 

                          Serum or plasma acetaminophen measurement (mass/volume

)           < ug/mL       

                                        10-30        

 

                                        Serum or plasma ethanol measurement (mas

s/volume) - 19 12:30         

 

                    Serum or plasma ethanol measurement (mass/volume)           

< mg/dL             

<10        

 

                                        Complete urinalysis with reflex to cultu

re - 19 05:29         

 

                    Urine color determination           YELLOW              NRG 

       

 

                    Urine clarity determination           CLEAR               NR

G        

 

                    Urine pH measurement by test strip           7              

     5-9        

 

                    Specific gravity of urine by test strip           1.010     

          1.016-1.022  

      

 

                          Urine protein assay by test strip, semi-quantitative  

         NEGATIVE         

                                        NEGATIVE        

 

                    Urine glucose detection by automated test strip           NE

GATIVE            

NEGATIVE        

 

                          Erythrocytes detection in urine sediment by light micr

oscopy           NEGATIVE 

                                        NEGATIVE        

 

                    Urine ketones detection by automated test strip           NE

GATIVE            

NEGATIVE        

 

                    Urine nitrite detection by test strip           NEGATIVE    

        NEGATIVE    

    

 

                    Urine total bilirubin detection by test strip           NEGA

TIVE            

NEGATIVE        

 

                          Urine urobilinogen measurement by automated test strip

 (mass/volume)           

NORMAL                                  NORMAL        

 

                    Urine leukocyte esterase detection by dipstick           NEG

ATIVE            

NEGATIVE        

 

                                        Automated urine sediment erythrocyte cou

nt by microscopy (number/high power 

field)                    NONE                      NRG        

 

                                        Automated urine sediment leukocyte count

 by microscopy (number/high power field)

                          NONE                      NRG        

 

                          Bacteria detection in urine sediment by light microsco

py           NEGATIVE     

                                        NRG        

 

                                        Squamous epithelial cells detection in u

rine sediment by light microscopy       

                          RARE                      NRG        

 

                          Crystals detection in urine sediment by light microsco

py           NONE         

                                        NRG        

 

                    Casts detection in urine sediment by light microscopy       

    NONE                

NRG        

 

                          Mucus detection in urine sediment by light microscopy 

          NEGATIVE        

                                        NRG        

 

                    Complete urinalysis with reflex to culture           NO     

             NRG        

 

                                        Urine drug screening test - 19 05:

29         

 

                    Urine phencyclidine detection by screening method           

NEGATIVE            

NEGATIVE        

 

                          Urine benzodiazepines detection by screening method   

        NEGATIVE          

                                        NEGATIVE        

 

                    Urine cocaine detection           NEGATIVE            NEGATI

VE        

 

                    Urine amphetamines detection by screening method           N

EGATIVE            

NEGATIVE        

 

                          Urine methamphetamine detection by screening method   

        NEGATIVE          

                                        NEGATIVE        

 

                    Urine cannabinoids detection by screening method           N

EGATIVE            

NEGATIVE        

 

                    Urine opiates detection by screening method           NEGATI

VE            

NEGATIVE        

 

                    Urine barbiturates detection           NEGATIVE            N

EGATIVE        

 

                          Screening urine tricyclic antidepressants detection   

        NEGATIVE          

                                        NEGATIVE        

 

                    Urine methadone detection by screening method           NEGA

TIVE            

NEGATIVE        

 

                    Urine oxycodone detection           NEGATIVE            NEGA

TIVE        

 

                    Urine propoxyphene detection           NEGATIVE            N

EGATIVE        

 

                                        Complete blood count (CBC) with automate

d white blood cell (WBC) differential - 

19 05:40         

 

                          Blood leukocytes automated count (number/volume)      

     6.3 10*3/uL          

                                        4.3-11.0        

 

                          Blood erythrocytes automated count (number/volume)    

       4.95 10*6/uL       

                                        4.35-5.85        

 

                    Venous blood hemoglobin measurement (mass/volume)           

15.0 g/dL           

13.3-17.7        

 

                    Blood hematocrit (volume fraction)           43 %           

     40-54        

 

                    Automated erythrocyte mean corpuscular volume           87 [

foz_us]           

80-99        

 

                                        Automated erythrocyte mean corpuscular h

emoglobin (mass per erythrocyte)        

                          30 pg                     25-34        

 

                                        Automated erythrocyte mean corpuscular h

emoglobin concentration measurement 

(mass/volume)             35 g/dL                   32-36        

 

                    Automated erythrocyte distribution width ratio           13.

8 %              10.0-

14.5        

 

                    Automated blood platelet count (count/volume)           296 

10*3/uL           

130-400        

 

                          Automated blood platelet mean volume measurement      

     10.5 [foz_us]        

                                        7.4-10.4        

 

                    Automated blood neutrophils/100 leukocytes           42 %   

             42-75       

 

 

                    Automated blood lymphocytes/100 leukocytes           41 %   

             12-44       

 

 

                    Blood monocytes/100 leukocytes           11 %               

 0-12        

 

                    Automated blood eosinophils/100 leukocytes           5 %    

             0-10        

 

                    Automated blood basophils/100 leukocytes           1 %      

           0-10        

 

                    Blood neutrophils automated count (number/volume)           

2.6 10*3            

1.8-7.8        

 

                    Blood lymphocytes automated count (number/volume)           

2.5 10*3            

1.0-4.0        

 

                    Blood monocytes automated count (number/volume)           0.

7 10*3            

0.0-1.0        

 

                    Automated eosinophil count           0.3 10*3/uL           0

.0-0.3        

 

                    Automated blood basophil count (count/volume)           0.0 

10*3/uL           

0.0-0.1        

 

                                        Comprehensive metabolic panel - 19

 05:40         

 

                          Serum or plasma sodium measurement (moles/volume)     

      141 mmol/L          

                                        135-145        

 

                          Serum or plasma potassium measurement (moles/volume)  

         3.9 mmol/L       

                                        3.6-5.0        

 

                          Serum or plasma chloride measurement (moles/volume)   

        108 mmol/L        

                                                

 

                    Carbon dioxide           21 mmol/L           21-32        

 

                          Serum or plasma anion gap determination (moles/volume)

           12 mmol/L      

                                        5-14        

 

                          Serum or plasma urea nitrogen measurement (mass/volume

)           9 mg/dL       

                                        7-18        

 

                          Serum or plasma creatinine measurement (mass/volume)  

         0.81 mg/dL       

                                        0.60-1.30        

 

                    Serum or plasma urea nitrogen/creatinine mass ratio         

  11                  NRG 

       

 

                                        Serum or plasma creatinine measurement w

ith calculation of estimated glomerular 

filtration rate           >                         NRG        

 

                    Serum or plasma glucose measurement (mass/volume)           

104 mg/dL           

        

 

                    Serum or plasma calcium measurement (mass/volume)           

9.2 mg/dL           

8.5-10.1        

 

                          Serum or plasma total bilirubin measurement (mass/volu

me)           0.5 mg/dL   

                                        0.1-1.0        

 

                                        Serum or plasma alkaline phosphatase gary

surement (enzymatic activity/volume)    

                          84 U/L                            

 

                                        Serum or plasma aspartate aminotransfera

se measurement (enzymatic 

activity/volume)           17 U/L                    5-34        

 

                                        Serum or plasma alanine aminotransferase

 measurement (enzymatic activity/volume)

                          10 U/L                    0-55        

 

                    Serum or plasma protein measurement (mass/volume)           

6.8 g/dL            

6.4-8.2        

 

                    Serum or plasma albumin measurement (mass/volume)           

4.2 g/dL            

3.2-4.5        

 

                    CALCIUM CORRECTED           9.0 mg/dL           8.5-10.1    

    

 

                                        Serum or plasma thyrotropin measurement 

by detection limit <=0.05 miu/l 

(units/volume) - 19 05:40         

 

                                        Serum or plasma thyrotropin measurement 

by detection limit <=0.05 miu/l 

(units/volume)            2.11 u[iU]/mL             0.35-4.94        

 

                                        Serum or plasma salicylates measurement 

(mass/volume) - 19 05:40         

 

                          Serum or plasma salicylates measurement (mass/volume) 

          < mg/dL         

                                        5.0-20.0        

 

                                        Serum or plasma acetaminophen measuremen

t (mass/volume) - 19 05:40        

 

 

                          Serum or plasma acetaminophen measurement (mass/volume

)           < ug/mL       

                                        10-30        

 

                                        Serum or plasma ethanol measurement (mas

s/volume) - 19 05:40         

 

                    Serum or plasma ethanol measurement (mass/volume)           

< mg/dL             

<10        

 

                                        LITHIUM LEVEL - 19 05:40         

 

                    Blood lithium measurement (moles/volume)           0.2 %    

           0.5-1.5      

  

 

                                        Complete urinalysis with reflex to cultu

re - 10/10/19 15:58         

 

                    Urine color determination           YELLOW              NRG 

       

 

                    Urine clarity determination           CLEAR               NR

G        

 

                    Urine pH measurement by test strip           5              

     5-9        

 

                    Specific gravity of urine by test strip           1.020     

          1.016-1.022  

      

 

                    Urine protein assay by test strip, semi-quantitative        

   1+                  

NEGATIVE        

 

                    Urine glucose detection by automated test strip           NE

GATIVE            

NEGATIVE        

 

                          Erythrocytes detection in urine sediment by light micr

oscopy           NEGATIVE 

                                        NEGATIVE        

 

                    Urine ketones detection by automated test strip           NE

GATIVE            

NEGATIVE        

 

                    Urine nitrite detection by test strip           NEGATIVE    

        NEGATIVE    

    

 

                    Urine total bilirubin detection by test strip           NEGA

TIVE            

NEGATIVE        

 

                          Urine urobilinogen measurement by automated test strip

 (mass/volume)           

NORMAL                                  NORMAL        

 

                    Urine leukocyte esterase detection by dipstick           NEG

ATIVE            

NEGATIVE        

 

                                        Automated urine sediment erythrocyte cou

nt by microscopy (number/high power 

field)                    NONE                      NRG        

 

                                        Automated urine sediment leukocyte count

 by microscopy (number/high power field)

                           [HPF]                    NRG        

 

                          Bacteria detection in urine sediment by light microsco

py           NEGATIVE     

                                        NRG        

 

                          Crystals detection in urine sediment by light microsco

py           NONE         

                                        NRG        

 

                    Casts detection in urine sediment by light microscopy       

    NONE                

NRG        

 

                          Mucus detection in urine sediment by light microscopy 

          SMALL           

                                        NRG        

 

                    Complete urinalysis with reflex to culture           NO     

             NRG        

 

                                        Urine drug screening test - 10/10/19 15:

58         

 

                    Urine phencyclidine detection by screening method           

NEGATIVE            

NEGATIVE        

 

                          Urine benzodiazepines detection by screening method   

        NEGATIVE          

                                        NEGATIVE        

 

                    Urine cocaine detection           NEGATIVE            NEGATI

VE        

 

                    Urine amphetamines detection by screening method           N

EGATIVE            

NEGATIVE        

 

                          Urine methamphetamine detection by screening method   

        NEGATIVE          

                                        NEGATIVE        

 

                    Urine cannabinoids detection by screening method           N

EGATIVE            

NEGATIVE        

 

                    Urine opiates detection by screening method           NEGATI

VE            

NEGATIVE        

 

                    Urine barbiturates detection           NEGATIVE            N

EGATIVE        

 

                          Screening urine tricyclic antidepressants detection   

        NEGATIVE          

                                        NEGATIVE        

 

                    Urine methadone detection by screening method           NEGA

TIVE            

NEGATIVE        

 

                    Urine oxycodone detection           NEGATIVE            NEGA

TIVE        

 

                    Urine propoxyphene detection           NEGATIVE            N

EGATIVE        

 

                                        Complete blood count (CBC) with automate

d white blood cell (WBC) differential - 

10/10/19 16:20         

 

                          Blood leukocytes automated count (number/volume)      

     5.8 10*3/uL          

                                        4.3-11.0        

 

                          Blood erythrocytes automated count (number/volume)    

       4.26 10*6/uL       

                                        4.35-5.85        

 

                    Venous blood hemoglobin measurement (mass/volume)           

13.4 g/dL           

13.3-17.7        

 

                    Blood hematocrit (volume fraction)           38 %           

     40-54        

 

                    Automated erythrocyte mean corpuscular volume           89 [

foz_us]           

80-99        

 

                                        Automated erythrocyte mean corpuscular h

emoglobin (mass per erythrocyte)        

                          32 pg                     25-34        

 

                                        Automated erythrocyte mean corpuscular h

emoglobin concentration measurement 

(mass/volume)             35 g/dL                   32-36        

 

                    Automated erythrocyte distribution width ratio           13.

4 %              10.0-

14.5        

 

                    Automated blood platelet count (count/volume)           258 

10*3/uL           

130-400        

 

                          Automated blood platelet mean volume measurement      

     10.2 [foz_us]        

                                        7.4-10.4        

 

                    Automated blood neutrophils/100 leukocytes           53 %   

             42-75       

 

 

                    Automated blood lymphocytes/100 leukocytes           33 %   

             12-44       

 

 

                    Blood monocytes/100 leukocytes           10 %               

 0-12        

 

                    Automated blood eosinophils/100 leukocytes           3 %    

             0-10        

 

                    Automated blood basophils/100 leukocytes           1 %      

           0-10        

 

                    Blood neutrophils automated count (number/volume)           

3.0 10*3            

1.8-7.8        

 

                    Blood lymphocytes automated count (number/volume)           

1.9 10*3            

1.0-4.0        

 

                    Blood monocytes automated count (number/volume)           0.

6 10*3            

0.0-1.0        

 

                    Automated eosinophil count           0.2 10*3/uL           0

.0-0.3        

 

                    Automated blood basophil count (count/volume)           0.1 

10*3/uL           

0.0-0.1        

 

                                        Comprehensive metabolic panel - 10/10/19

 16:20         

 

                          Serum or plasma sodium measurement (moles/volume)     

      135 mmol/L          

                                        135-145        

 

                          Serum or plasma potassium measurement (moles/volume)  

         3.9 mmol/L       

                                        3.6-5.0        

 

                          Serum or plasma chloride measurement (moles/volume)   

        105 mmol/L        

                                                

 

                    Carbon dioxide           24 mmol/L           21-32        

 

                          Serum or plasma anion gap determination (moles/volume)

           6 mmol/L       

                                        5-14        

 

                          Serum or plasma urea nitrogen measurement (mass/volume

)           11 mg/dL      

                                        7-18        

 

                          Serum or plasma creatinine measurement (mass/volume)  

         0.84 mg/dL       

                                        0.60-1.30        

 

                    Serum or plasma urea nitrogen/creatinine mass ratio         

  13                  NRG 

       

 

                                        Serum or plasma creatinine measurement w

ith calculation of estimated glomerular 

filtration rate           >                         NRG        

 

                    Serum or plasma glucose measurement (mass/volume)           

100 mg/dL           

        

 

                    Serum or plasma calcium measurement (mass/volume)           

8.5 mg/dL           

8.5-10.1        

 

                          Serum or plasma total bilirubin measurement (mass/volu

me)           0.6 mg/dL   

                                        0.1-1.0        

 

                                        Serum or plasma alkaline phosphatase gary

surement (enzymatic activity/volume)    

                          82 U/L                            

 

                                        Serum or plasma aspartate aminotransfera

se measurement (enzymatic 

activity/volume)           12 U/L                    5-34        

 

                                        Serum or plasma alanine aminotransferase

 measurement (enzymatic activity/volume)

                          11 U/L                    0-55        

 

                    Serum or plasma protein measurement (mass/volume)           

6.2 g/dL            

6.4-8.2        

 

                    Serum or plasma albumin measurement (mass/volume)           

3.8 g/dL            

3.2-4.5        

 

                    CALCIUM CORRECTED           8.7 mg/dL           8.5-10.1    

    

 

                                        Serum or plasma salicylates measurement 

(mass/volume) - 10/10/19 16:20         

 

                          Serum or plasma salicylates measurement (mass/volume) 

          < mg/dL         

                                        5.0-20.0        

 

                                        Serum or plasma acetaminophen measuremen

t (mass/volume) - 10/10/19 16:20        

 

 

                          Serum or plasma acetaminophen measurement (mass/volume

)           < ug/mL       

                                        10-30        

 

                                        Serum or plasma ethanol measurement (mas

s/volume) - 10/10/19 16:20         

 

                    Serum or plasma ethanol measurement (mass/volume)           

< mg/dL             

<10        

 

                                        Chlamydia DNA amp probe, urine - 10/10/1

9 16:46         

 

                    Chlamydia DNA amp probe, urine           Not Detected       

     Not Detected   

     

 

                                        Urine Neisseria gonorrhoeae DNA assay - 

10/10/19 16:46         

 

                    Gonorrhea amp DNA-urine           Not Detected            No

t Detected        

 

                                        Serum reagin antibody assay (units/volum

e) by [facility] - 10/10/19 16:46         

 

                          Serum reagin antibody assay (units/volume) by [facility]     

      Non-Reactive        

                                        Non-Reactive        

 

                                        Urine drug screening test - 10/20/19 07:

11         

 

                    Urine phencyclidine detection by screening method           

NEGATIVE            

NEGATIVE        

 

                          Urine benzodiazepines detection by screening method   

        NEGATIVE          

                                        NEGATIVE        

 

                    Urine cocaine detection           NEGATIVE            NEGATI

VE        

 

                    Urine amphetamines detection by screening method           N

EGATIVE            

NEGATIVE        

 

                          Urine methamphetamine detection by screening method   

        NEGATIVE          

                                        NEGATIVE        

 

                    Urine cannabinoids detection by screening method           N

EGATIVE            

NEGATIVE        

 

                    Urine opiates detection by screening method           NEGATI

VE            

NEGATIVE        

 

                    Urine barbiturates detection           NEGATIVE            N

EGATIVE        

 

                          Screening urine tricyclic antidepressants detection   

        NEGATIVE          

                                        NEGATIVE        

 

                    Urine methadone detection by screening method           NEGA

TIVE            

NEGATIVE        

 

                    Urine oxycodone detection           NEGATIVE            NEGA

TIVE        

 

                    Urine propoxyphene detection           NEGATIVE            N

EGATIVE        

 

                                        Complete blood count (CBC) with automate

d white blood cell (WBC) differential - 

10/20/19 07:12         

 

                          Blood leukocytes automated count (number/volume)      

     6.9 10*3/uL          

                                        4.3-11.0        

 

                          Blood erythrocytes automated count (number/volume)    

       4.89 10*6/uL       

                                        4.35-5.85        

 

                    Venous blood hemoglobin measurement (mass/volume)           

15.1 g/dL           

13.3-17.7        

 

                    Blood hematocrit (volume fraction)           44 %           

     40-54        

 

                    Automated erythrocyte mean corpuscular volume           90 [

foz_us]           

80-99        

 

                                        Automated erythrocyte mean corpuscular h

emoglobin (mass per erythrocyte)        

                          31 pg                     25-34        

 

                                        Automated erythrocyte mean corpuscular h

emoglobin concentration measurement 

(mass/volume)             34 g/dL                   32-36        

 

                    Automated erythrocyte distribution width ratio           14.

5 %              10.0-

14.5        

 

                    Automated blood platelet count (count/volume)           247 

10*3/uL           

130-400        

 

                          Automated blood platelet mean volume measurement      

     10.4 [foz_us]        

                                        7.4-10.4        

 

                    Automated blood neutrophils/100 leukocytes           48 %   

             42-75       

 

 

                    Automated blood lymphocytes/100 leukocytes           35 %   

             12-44       

 

 

                    Blood monocytes/100 leukocytes           10 %               

 0-12        

 

                    Automated blood eosinophils/100 leukocytes           7 %    

             0-10        

 

                    Automated blood basophils/100 leukocytes           1 %      

           0-10        

 

                    Blood neutrophils automated count (number/volume)           

3.3 10*3            

1.8-7.8        

 

                    Blood lymphocytes automated count (number/volume)           

2.4 10*3            

1.0-4.0        

 

                    Blood monocytes automated count (number/volume)           0.

7 10*3            

0.0-1.0        

 

                    Automated eosinophil count           0.5 10*3/uL           0

.0-0.3        

 

                    Automated blood basophil count (count/volume)           0.1 

10*3/uL           

0.0-0.1        

 

                                        Comprehensive metabolic panel - 10/20/19

 07:12         

 

                          Serum or plasma sodium measurement (moles/volume)     

      136 mmol/L          

                                        135-145        

 

                          Serum or plasma potassium measurement (moles/volume)  

         4.8 mmol/L       

                                        3.6-5.0        

 

                          Serum or plasma chloride measurement (moles/volume)   

        102 mmol/L        

                                                

 

                    Carbon dioxide           22 mmol/L           21-32        

 

                          Serum or plasma anion gap determination (moles/volume)

           12 mmol/L      

                                        5-14        

 

                          Serum or plasma urea nitrogen measurement (mass/volume

)           14 mg/dL      

                                        7-18        

 

                          Serum or plasma creatinine measurement (mass/volume)  

         0.95 mg/dL       

                                        0.60-1.30        

 

                    Serum or plasma urea nitrogen/creatinine mass ratio         

  15                  NRG 

       

 

                                        Serum or plasma creatinine measurement w

ith calculation of estimated glomerular 

filtration rate           >                         NRG        

 

                    Serum or plasma glucose measurement (mass/volume)           

106 mg/dL           

        

 

                    Serum or plasma calcium measurement (mass/volume)           

9.2 mg/dL           

8.5-10.1        

 

                          Serum or plasma total bilirubin measurement (mass/volu

me)           0.3 mg/dL   

                                        0.1-1.0        

 

                                        Serum or plasma alkaline phosphatase gary

surement (enzymatic activity/volume)    

                          99 U/L                            

 

                                        Serum or plasma aspartate aminotransfera

se measurement (enzymatic 

activity/volume)           18 U/L                    5-34        

 

                                        Serum or plasma alanine aminotransferase

 measurement (enzymatic activity/volume)

                          20 U/L                    0-55        

 

                    Serum or plasma protein measurement (mass/volume)           

7.0 g/dL            

6.4-8.2        

 

                    Serum or plasma albumin measurement (mass/volume)           

4.3 g/dL            

3.2-4.5        

 

                    CALCIUM CORRECTED           9.0 mg/dL           8.5-10.1    

    

 

                                        Serum or plasma salicylates measurement 

(mass/volume) - 10/20/19 07:12         

 

                          Serum or plasma salicylates measurement (mass/volume) 

          < mg/dL         

                                        5.0-20.0        

 

                                        Serum or plasma acetaminophen measuremen

t (mass/volume) - 10/20/19 07:12        

 

 

                          Serum or plasma acetaminophen measurement (mass/volume

)           < ug/mL       

                                        10-30        

 

                                        Serum or plasma ethanol measurement (mas

s/volume) - 10/20/19 07:12         

 

                    Serum or plasma ethanol measurement (mass/volume)           

< mg/dL             

<10        

 

                                        TSH (REFLEX FREE T4 IF ABNORMAL) - 10/21

/19 05:18         

 

                    TSH                 1.881 uIU/mL           0.400-4.000      

  

 

                                        Complete blood count (CBC) with automate

d white blood cell (WBC) differential - 

20 13:00         

 

                          Blood leukocytes automated count (number/volume)      

     6.7 10*3/uL          

                                        4.3-11.0        

 

                          Blood erythrocytes automated count (number/volume)    

       5.23 10*6/uL       

                                        4.35-5.85        

 

                    Venous blood hemoglobin measurement (mass/volume)           

15.9 g/dL           

13.3-17.7        

 

                    Blood hematocrit (volume fraction)           46 %           

     40-54        

 

                    Automated erythrocyte mean corpuscular volume           88 [

foz_us]           

80-99        

 

                                        Automated erythrocyte mean corpuscular h

emoglobin (mass per erythrocyte)        

                          30 pg                     25-34        

 

                                        Automated erythrocyte mean corpuscular h

emoglobin concentration measurement 

(mass/volume)             35 g/dL                   32-36        

 

                    Automated erythrocyte distribution width ratio           13.

5 %              10.0-

14.5        

 

                    Automated blood platelet count (count/volume)           253 

10*3/uL           

130-400        

 

                          Automated blood platelet mean volume measurement      

     9.5 [foz_us]         

                                        7.4-10.4        

 

                    Automated blood neutrophils/100 leukocytes           63 %   

             42-75       

 

 

                    Automated blood lymphocytes/100 leukocytes           25 %   

             12-44       

 

 

                    Blood monocytes/100 leukocytes           7 %                

 0-12        

 

                    Automated blood eosinophils/100 leukocytes           4 %    

             0-10        

 

                    Automated blood basophils/100 leukocytes           1 %      

           0-10        

 

                    Blood neutrophils automated count (number/volume)           

4.3 10*3            

1.8-7.8        

 

                    Blood lymphocytes automated count (number/volume)           

1.7 10*3            

1.0-4.0        

 

                    Blood monocytes automated count (number/volume)           0.

5 10*3            

0.0-1.0        

 

                    Automated eosinophil count           0.3 10*3/uL           0

.0-0.3        

 

                    Automated blood basophil count (count/volume)           0.1 

10*3/uL           

0.0-0.1        

 

                                        Whole blood basic metabolic panel -  13:00         

 

                          Serum or plasma sodium measurement (moles/volume)     

      139 mmol/L          

                                        135-145        

 

                          Serum or plasma potassium measurement (moles/volume)  

         4.2 mmol/L       

                                        3.6-5.0        

 

                          Serum or plasma chloride measurement (moles/volume)   

        105 mmol/L        

                                                

 

                    Carbon dioxide           26 mmol/L           21-32        

 

                          Serum or plasma anion gap determination (moles/volume)

           8 mmol/L       

                                        5-14        

 

                          Serum or plasma urea nitrogen measurement (mass/volume

)           17 mg/dL      

                                        7-18        

 

                          Serum or plasma creatinine measurement (mass/volume)  

         1.18 mg/dL       

                                        0.60-1.30        

 

                    Serum or plasma urea nitrogen/creatinine mass ratio         

  14                  NRG 

       

 

                                        Serum or plasma creatinine measurement w

ith calculation of estimated glomerular 

filtration rate           >                         NRG        

 

                    Serum or plasma glucose measurement (mass/volume)           

105 mg/dL           

        

 

                    Serum or plasma calcium measurement (mass/volume)           

9.2 mg/dL           

8.5-10.1        



                                                                                
                    



Encounters

      



                ACCT No.           Visit Date/Time           Discharge          

 Status         

             Pt. Type           Provider           Facility           Loc./Unit 

          

Complaint        

 

                [id number]           2019 13:15:00           2019 09:20:

00           DIS

                    Inpatient           [facility]                    [place]                               

 

             [id number]           2019 16:53:21                               

      Document 

Registration                                                                    

 

                    [id number]           2020 11:52:00            13:45:00        

                DIS             Emergency           PARKER BELLAMY         

  [facility]           ER                        DIZZINESS        

 

                    [id number]           10/20/2019 05:44:00           10/20/2

019 11:00:00        

                DIS             Emergency           MEME FAROOQ, JUN DIAZ          

 [facility]           ER                        DEPRESSION        

 

                    [id number]           10/10/2019 14:59:00           10/10/2

019 19:39:00        

                DIS             Emergency           PARKER BELLAMY         

  [facility]           ER                        DEPRESSION        

 

                    [id number]           2019 05:17:00            11:03:00        

                DIS             Emergency           VALENTINE FAROOQ, PRASHANT BAKER    

       [facility] 

[facility]           ER                        SUICIDAL,WANTS 

TO HARM SELF 

       

 

                    [id number]           2019 11:50:00            15:25:00        

                DIS             Emergency           PARKER BELLAMY         

  [facility]           ER                        DEPRESSION        

 

                    [id number]           2019 11:16:00            15:37:00        

                DIS             Emergency           PARKER BELLAMY         

  [facility]           ER                        SUICIDAL IDEATION      

  

 

             [id number]           2020 20:58:00                        A

CT           

Emergency                 PARKER BELLAMY           [facility]                 ER                        ALLERGY ATTACK        

 

                    [id number]           10/20/2019 13:34:00           10/28/201

9 13:10:00          

                DIS             Inpatient           CARISSA WONG           [facility]                                 

 

                [id number]           2020 15:45:00           2020 23:59:

59           CLS

                Outpatient           MANNY ISAACS LAC                          

 [facility] 

[place] [facility]

## 2020-06-01 ENCOUNTER — HOSPITAL ENCOUNTER (EMERGENCY)
Dept: HOSPITAL 75 - ER | Age: 65
Discharge: HOME | End: 2020-06-01
Payer: MEDICARE

## 2020-06-01 VITALS — SYSTOLIC BLOOD PRESSURE: 145 MMHG | DIASTOLIC BLOOD PRESSURE: 96 MMHG

## 2020-06-01 VITALS — HEIGHT: 70 IN | BODY MASS INDEX: 17.49 KG/M2 | WEIGHT: 122.14 LBS

## 2020-06-01 DIAGNOSIS — J30.2: Primary | ICD-10-CM

## 2020-06-01 DIAGNOSIS — F17.210: ICD-10-CM

## 2020-06-01 DIAGNOSIS — F15.10: ICD-10-CM

## 2020-06-01 PROCEDURE — 99283 EMERGENCY DEPT VISIT LOW MDM: CPT

## 2020-06-01 NOTE — ED GENERAL
General


Chief Complaint:  Allergic Reaction


Stated Complaint:  ALLERGIES


Source of Information:  Patient, EMS


Exam Limitations:  No Limitations





History of Present Illness


Date Seen by Provider:  Jun 1, 2020


Time Seen by Provider:  18:15


Initial Comments


Patient presents to ER by EMS with chief complaint of seasonal allergies to 

everything green, [place] pollen etc. He says typically he takes Benadryl for 5

tablets of time. He does not take anything else prescription wise. He says he'll

also use pseudoephedrine and methamphetamines as this helps clear up his nose. 

He says is been attacking him for the past 2 weeks and so used methamphetamines 

which cleared up his nose but then he got put in assisted and he just got released 

today. After being released from assisted he went straight to the EMS station and 

management a ride to the hospital for Benadryl area and he would like Benadryl 

IV as well as 4 tablets by mouth.





Allergies and Home Medications


Allergies


Coded Allergies:  


     No Known Drug Allergies (Unverified , 8/25/19)





Patient Home Medication List


Home Medication List Reviewed:  Yes





Review of Systems


Review of Systems


Constitutional:  No chills, No diaphoresis


EENTM:  No ear discharge, No ear pain


Respiratory:  No cough, No short of breath


Cardiovascular:  No edema, No Hx of Intervention


Gastrointestinal:  No abdominal pain, No constipation, No diarrhea


Genitourinary:  No decreased output, No discharge


Musculoskeletal:  No back pain, No joint pain





All Other Systems Reviewed


Negative Unless Noted:  Yes





Past Medical-Social-Family Hx


Patient Social History


Alcohol Use:  Denies Use


Recreational Drug Use:  Yes


Drug of Choice:  meth


Smoking Status:  Current Everyday Smoker


Type Used:  Cigarettes


2nd Hand Smoke Exposure:  No


Recent Hopitalizations:  No





Immunizations Up To Date


Tetanus Booster (TDap):  Unknown





Seasonal Allergies


Seasonal Allergies:  Yes





Past Medical History


Surgeries:  Yes


Prostatectomy


Respiratory:  No


Cardiac:  No


Neurological:  No


Genitourinary:  Yes


Prostate Problems


Gastrointestinal:  No (INGUINAL HERNIA)


Musculoskeletal:  No


Endocrine:  No


HEENT:  No


Cancer:  No


Psychosocial:  Yes ("BIPOLAR TYPE 2, SCHIZOID NO DILUSIONS")


Sleep Difficulties, Anxiety, Bipolar, Depression


Integumentary:  No


Blood Disorders:  No





Physical Exam


Vital Signs





Vital Signs - First Documented








 6/1/20





 18:21


 


Temp 36.6


 


Pulse 97


 


Resp 18


 


B/P (MAP) 145/96 (112)


 


Pulse Ox 98


 


O2 Delivery Room Air





Capillary Refill :


Height, Weight, BMI


Height: 5'10.00"


Weight: 150lbs. oz. 68.639844ps; 24.00 BMI


Method:Stated


General Appearance:  No Apparent Distress, WD/WN


Eyes:  Bilateral Eye Normal Inspection, Bilateral Eye PERRL, Bilateral Eye EOMI


HEENT:  PERRL/EOMI, Pharynx Normal, Moist Mucous Membranes


Neck:  Full Range of Motion, Normal Inspection


Respiratory:  Lungs Clear, Normal Breath Sounds, No Accessory Muscle Use, No 

Respiratory Distress


Cardiovascular:  Regular Rate, Rhythm, Normal Peripheral Pulses


Neurologic/Psychiatric:  Alert, Oriented x3 (person place time and situation. 

Short and long-term memory are intact.), No Motor/Sensory Deficits





Progress/Results/Core Measures


Suspected Sepsis


SIRS


Temperature: 


Pulse:  


Respiratory Rate: 


 


Blood Pressure  / 


Mean:





Results/Orders


My Orders





Orders - JOLIE OBRIEN


Diphenhydramine Tablet (Benadryl Tablet) (6/1/20 18:30)





Medications Given in ED





Current Medications








 Medications  Dose


 Ordered  Sig/Veronique


 Route  Start Time


 Stop Time Status Last Admin


Dose Admin


 


 Diphenhydramine


 HCl  50 mg  ONCE  ONCE


 PO  6/1/20 18:30


 6/1/20 18:31  6/1/20 18:30


50 MG








Vital Signs/I&O











 6/1/20





 18:21


 


Temp 36.6


 


Pulse 97


 


Resp 18


 


B/P (MAP) 145/96 (112)


 


Pulse Ox 98


 


O2 Delivery Room Air





Capillary Refill :


Progress Note :  


   Time:  18:35


Progress Note


Patient admits having used methamphetamines and would like some Benadryl which 

oftentimes can be helpful for some of the side effects. He does not have any 

apparent rhinorrhea, shortness of breath, increased work of breathing, wheezing 

on auscultation. His oxygen sats and heart sounds are normal. His vital signs 

are aseptic. We will be happy to give him a dose of Benadryl 50 mg as well as 

some Claritin samples. We have discussed return precautions and he is okay with 

this plan. He walked out under his own power and had no material deterioration 

during his stay here.


Several times when he saw one of the practitioners who apparently he has been 

under the care of in the past he became agitated. He yelled several cuts words 

told him that he was going to tucker him but was easily redirected and encouraged 

to discharge peacefully.  was on hand but not necessary to 

redirect his behavior.





Departure


Impression





   Primary Impression:  


   Seasonal allergies


   Additional Impression:  


   Methamphetamine abuse


Disposition:  01 HOME, SELF-CARE


Condition:  Stable





Departure-Patient Inst.


Decision time for Depature:  18:26


Referrals:  


NO,LOCAL PHYSICIAN (PCP/Family)


Primary Care Physician


Patient Instructions:  Seasonal Allergies (DC)





Add. Discharge Instructions:  


Claritin or Zyrtec one tablet daily for seasonal allergies. He still have 

breakthrough allergies he can take one to 2 tablets of Benadryl every 6 hours.








All discharge instructions reviewed with patient and/or family. Voiced understrima gates.











JOLIE OBRIEN                  Jun 1, 2020 18:27

## 2020-06-01 NOTE — XMS REPORT
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  

     

 

                          Urine protein assay by test strip, semi-quantitative  

         NEGATIVE         

                                        NEGATIVE        

 

                    Urine glucose detection by automated test strip           NE

GATIVE            

NEGATIVE        

 

                          Erythrocytes detection in urine sediment by light micr

oscopy           NEGATIVE 

                                        NEGATIVE        

 

                    Urine ketones detection by automated test strip           NE

GATIVE            

NEGATIVE        

 

                    Urine nitrite detection by test strip           NEGATIVE    

        NEGATIVE    

   

 

                    Urine total bilirubin detection by test strip           NEGA

TIVE            

NEGATIVE        

 

                          Urine urobilinogen measurement by automated test strip

 (mass/volume)           

NORMAL                                  NORMAL        

 

                    Urine leukocyte esterase detection by dipstick           NEG

ATIVE            

NEGATIVE        

 

                                        Automated urine sediment erythrocyte cou

nt by microscopy (number/high power 

field)                    NONE                      NRG        

 

                                        Automated urine sediment leukocyte count

 by microscopy (number/high power field)

                          NONE                      NRG        

 

                          Bacteria detection in urine sediment by light microsco

py           NEGATIVE     

                                        NRG        

 

                                        Squamous epithelial cells detection in u

rine sediment by light microscopy       

                          RARE                      NRG        

 

                          Crystals detection in urine sediment by light microsco

py           NONE         

                                        NRG        

 

                    Casts detection in urine sediment by light microscopy       

    NONE                

NRG        

 

                          Mucus detection in urine sediment by light microscopy 

          MODERATE        

                                        NRG        

 

                    Complete urinalysis with reflex to culture           NO     

             NRG        

 

                                        Complete blood count (CBC) with automate

d white blood cell (WBC) differential - 

19 11:57         

 

                          Blood leukocytes automated count (number/volume)      

     4.6 10*3/uL          

                                        4.3-11.0        

 

                          Blood erythrocytes automated count (number/volume)    

       4.82 10*6/uL       

                                        4.35-5.85        

 

                    Venous blood hemoglobin measurement (mass/volume)           

14.9 g/dL           

13.3-17.7        

 

                    Blood hematocrit (volume fraction)           43 %           

     40-54        

 

                    Automated erythrocyte mean corpuscular volume           89 [

foz_us]           

80-99        

 

                                        Automated erythrocyte mean corpuscular h

emoglobin (mass per erythrocyte)        

                          31 pg                     25-34        

 

                                        Automated erythrocyte mean corpuscular h

emoglobin concentration measurement 

(mass/volume)             35 g/dL                   32-36        

 

                    Automated erythrocyte distribution width ratio           12.

8 %              10.0-

14.5        

 

                    Automated blood platelet count (count/volume)           239 

10*3/uL           

130-400        

 

                          Automated blood platelet mean volume measurement      

     10.7 [foz_us]        

                                        7.4-10.4        

 

                    Automated blood neutrophils/100 leukocytes           59 %   

             42-75       

 

 

                    Automated blood lymphocytes/100 leukocytes           29 %   

             12-44       

 

 

                    Blood monocytes/100 leukocytes           10 %               

 0-12        

 

                    Automated blood eosinophils/100 leukocytes           2 %    

             0-10        

 

                    Automated blood basophils/100 leukocytes           1 %      

           0-10        

 

                    Blood neutrophils automated count (number/volume)           

2.7 10*3            

1.8-7.8        

 

                    Blood lymphocytes automated count (number/volume)           

1.3 10*3            

1.0-4.0        

 

                    Blood monocytes automated count (number/volume)           0.

5 10*3            

0.0-1.0        

 

                    Automated eosinophil count           0.1 10*3/uL           0

.0-0.3        

 

                    Automated blood basophil count (count/volume)           0.0 

10*3/uL           

0.0-0.1        

 

                                        Comprehensive metabolic panel - 19

 11:57         

 

                          Serum or plasma sodium measurement (moles/volume)     

      133 mmol/L          

                                        135-145        

 

                          Serum or plasma potassium measurement (moles/volume)  

         4.4 mmol/L       

                                        3.6-5.0        

 

                          Serum or plasma chloride measurement (moles/volume)   

        102 mmol/L        

                                                

 

                    Carbon dioxide           22 mmol/L           21-32        

 

                          Serum or plasma anion gap determination (moles/volume)

           9 mmol/L       

                                        5-14        

 

                          Serum or plasma urea nitrogen measurement (mass/volume

)           13 mg/dL      

                                        7-18        

 

                          Serum or plasma creatinine measurement (mass/volume)  

         0.99 mg/dL       

                                        0.60-1.30        

 

                    Serum or plasma urea nitrogen/creatinine mass ratio         

  13                  NRG 

       

 

                                        Serum or plasma creatinine measurement w

ith calculation of estimated glomerular 

filtration rate           >                         NRG        

 

                    Serum or plasma glucose measurement (mass/volume)           

116 mg/dL           

        

 

                    Serum or plasma calcium measurement (mass/volume)           

9.1 mg/dL           

8.5-10.1        

 

                          Serum or plasma total bilirubin measurement (mass/volu

me)           0.4 mg/dL   

                                        0.1-1.0        

 

                                        Serum or plasma alkaline phosphatase gary

surement (enzymatic activity/volume)    

                          92 U/L                            

 

                                        Serum or plasma aspartate aminotransfera

se measurement (enzymatic 

activity/volume)           11 U/L                    5-34        

 

                                        Serum or plasma alanine aminotransferase

 measurement (enzymatic activity/volume)

                          10 U/L                    0-55        

 

                    Serum or plasma protein measurement (mass/volume)           

6.9 g/dL            

6.4-8.2        

 

                    Serum or plasma albumin measurement (mass/volume)           

4.3 g/dL            

3.2-4.5        

 

                    CALCIUM CORRECTED           8.9 mg/dL           8.5-10.1    

    

 

                                        Serum or plasma salicylates measurement 

(mass/volume) - 19 11:57         

 

                          Serum or plasma salicylates measurement (mass/volume) 

          < mg/dL         

                                        5.0-20.0        

 

                                        Serum or plasma acetaminophen measuremen

t (mass/volume) - 19 11:57        

 

 

                          Serum or plasma acetaminophen measurement (mass/volume

)           < ug/mL       

                                        10-30        

 

                                        Serum or plasma ethanol measurement (mas

s/volume) - 19 11:57         

 

                    Serum or plasma ethanol measurement (mass/volume)           

< mg/dL             

<10        

 

                                        Lipid Panel - 19 05:21         

 

                    C/HDL               4.5                 3.7-6.7        

 

                    Cholesterol           157 mg/dL           100-240        

 

                    HDL                 35 mg/dL            30-85        

 

                    LDL-Calculated           98 mg/dL            0-100        

 

                    Trig                121 mg/dL                   

 

                    VLDL                24 mg/dL            0-42        

 

                                        Complete urinalysis with reflex to cultu

re - 19 12:03         

 

                    Urine color determination           YELLOW              NRG 

       

 

                    Urine clarity determination           SLIGHTLY CLOUDY       

     NRG        

 

                    Urine pH measurement by test strip           5              

     5-9        

 

                    Specific gravity of urine by test strip           1.025     

          1.016-1.022  

      

 

                    Urine protein assay by test strip, semi-quantitative        

   2+                  

NEGATIVE        

 

                    Urine glucose detection by automated test strip           NE

GATIVE            

NEGATIVE        

 

                          Erythrocytes detection in urine sediment by light micr

oscopy           5+       

                                        NEGATIVE        

 

                    Urine ketones detection by automated test strip           2+

                  NEGATIVE

        

 

                    Urine nitrite detection by test strip           NEGATIVE    

        NEGATIVE    

    

 

                    Urine total bilirubin detection by test strip           NEGA

TIVE            

NEGATIVE        

 

                          Urine urobilinogen measurement by automated test strip

 (mass/volume)           

NORMAL                                  NORMAL        

 

                    Urine leukocyte esterase detection by dipstick           1+ 

                 NEGATIVE 

       

 

                                        Automated urine sediment erythrocyte cou

nt by microscopy (number/high power 

field)                    NONE                      NRG        

 

                                        Automated urine sediment leukocyte count

 by microscopy (number/high power field)

                           [HPF]                    NRG        

 

                          Bacteria detection in urine sediment by light microsco

py           MODERATE     

                                        NRG        

 

                          Crystals detection in urine sediment by light microsco

py           NONE         

                                        NRG        

 

                    Casts detection in urine sediment by light microscopy       

    NONE                

NRG        

 

                          Mucus detection in urine sediment by light microscopy 

          MODERATE        

                                        NRG        

 

                    Complete urinalysis with reflex to culture           YES    

             NRG        

 

                                        Urine drug screening test - 19 12:

03         

 

                    Urine phencyclidine detection by screening method           

NEGATIVE            

NEGATIVE        

 

                          Urine benzodiazepines detection by screening method   

        NEGATIVE          

                                        NEGATIVE        

 

                    Urine cocaine detection           NEGATIVE            NEGATI

VE        

 

                    Urine amphetamines detection by screening method           N

EGATIVE            

NEGATIVE        

 

                          Urine methamphetamine detection by screening method   

        NEGATIVE          

                                        NEGATIVE        

 

                    Urine cannabinoids detection by screening method           N

EGATIVE            

NEGATIVE        

 

                    Urine opiates detection by screening method           NEGATI

VE            

NEGATIVE        

 

                    Urine barbiturates detection           NEGATIVE            N

EGATIVE        

 

                          Screening urine tricyclic antidepressants detection   

        NEGATIVE          

                                        NEGATIVE        

 

                    Urine methadone detection by screening method           NEGA

TIVE            

NEGATIVE        

 

                    Urine oxycodone detection           NEGATIVE            NEGA

TIVE        

 

                    Urine propoxyphene detection           NEGATIVE            N

EGATIVE        

 

                                        Complete blood count (CBC) with automate

d white blood cell (WBC) differential - 

19 12:30         

 

                          Blood leukocytes automated count (number/volume)      

     7.9 10*3/uL          

                                        4.3-11.0        

 

                          Blood erythrocytes automated count (number/volume)    

       5.09 10*6/uL       

                                        4.35-5.85        

 

                    Venous blood hemoglobin measurement (mass/volume)           

15.6 g/dL           

13.3-17.7        

 

                    Blood hematocrit (volume fraction)           45 %           

     40-54        

 

                    Automated erythrocyte mean corpuscular volume           87 [

foz_us]           

80-99        

 

                                        Automated erythrocyte mean corpuscular h

emoglobin (mass per erythrocyte)        

                          31 pg                     25-34        

 

                                        Automated erythrocyte mean corpuscular h

emoglobin concentration measurement 

(mass/volume)             35 g/dL                   32-36        

 

                    Automated erythrocyte distribution width ratio           13.

0 %              10.0-

14.5        

 

                    Automated blood platelet count (count/volume)           268 

10*3/uL           

130-400        

 

                          Automated blood platelet mean volume measurement      

     10.4 [foz_us]        

                                        7.4-10.4        

 

                    Automated blood neutrophils/100 leukocytes           62 %   

             42-75       

 

 

                    Automated blood lymphocytes/100 leukocytes           26 %   

             12-44       

 

 

                    Blood monocytes/100 leukocytes           10 %               

 0-12        

 

                    Automated blood eosinophils/100 leukocytes           2 %    

             0-10        

 

                    Automated blood basophils/100 leukocytes           1 %      

           0-10        

 

                    Blood neutrophils automated count (number/volume)           

4.9 10*3            

1.8-7.8        

 

                    Blood lymphocytes automated count (number/volume)           

2.0 10*3            

1.0-4.0        

 

                    Blood monocytes automated count (number/volume)           0.

8 10*3            

0.0-1.0        

 

                    Automated eosinophil count           0.2 10*3/uL           0

.0-0.3        

 

                    Automated blood basophil count (count/volume)           0.1 

10*3/uL           

0.0-0.1        

 

                                        Comprehensive metabolic panel - 19

 12:30         

 

                          Serum or plasma sodium measurement (moles/volume)     

      137 mmol/L          

                                        135-145        

 

                          Serum or plasma potassium measurement (moles/volume)  

         4.2 mmol/L       

                                        3.6-5.0        

 

                          Serum or plasma chloride measurement (moles/volume)   

        105 mmol/L        

                                                

 

                    Carbon dioxide           19 mmol/L           21-32        

 

                          Serum or plasma anion gap determination (moles/volume)

           13 mmol/L      

                                        5-14        

 

                          Serum or plasma urea nitrogen measurement (mass/volume

)           21 mg/dL      

                                        7-18        

 

                          Serum or plasma creatinine measurement (mass/volume)  

         1.00 mg/dL       

                                        0.60-1.30        

 

                    Serum or plasma urea nitrogen/creatinine mass ratio         

  21                  NRG 

       

 

                                        Serum or plasma creatinine measurement w

ith calculation of estimated glomerular 

filtration rate           >                         NRG        

 

                    Serum or plasma glucose measurement (mass/volume)           

102 mg/dL           

        

 

                    Serum or plasma calcium measurement (mass/volume)           

9.2 mg/dL           

8.5-10.1        

 

                          Serum or plasma total bilirubin measurement (mass/volu

me)           0.8 mg/dL   

                                        0.1-1.0        

 

                                        Serum or plasma alkaline phosphatase gary

surement (enzymatic activity/volume)    

                          102 U/L                           

 

                                        Serum or plasma aspartate aminotransfera

se measurement (enzymatic 

activity/volume)           16 U/L                    5-34        

 

                                        Serum or plasma alanine aminotransferase

 measurement (enzymatic activity/volume)

                          11 U/L                    0-55        

 

                    Serum or plasma protein measurement (mass/volume)           

7.0 g/dL            

6.4-8.2        

 

                    Serum or plasma albumin measurement (mass/volume)           

4.4 g/dL            

3.2-4.5        

 

                    CALCIUM CORRECTED           8.9 mg/dL           8.5-10.1    

    

 

                                        Serum or plasma salicylates measurement 

(mass/volume) - 19 12:30         

 

                          Serum or plasma salicylates measurement (mass/volume) 

          < mg/dL         

                                        5.0-20.0        

 

                                        Serum or plasma acetaminophen measuremen

t (mass/volume) - 19 12:30        

 

 

                          Serum or plasma acetaminophen measurement (mass/volume

)           < ug/mL       

                                        10-30        

 

                                        Serum or plasma ethanol measurement (mas

s/volume) - 19 12:30         

 

                    Serum or plasma ethanol measurement (mass/volume)           

< mg/dL             

<10        

 

                                        Complete urinalysis with reflex to cultu

re - 19 05:29         

 

                    Urine color determination           YELLOW              NRG 

       

 

                    Urine clarity determination           CLEAR               NR

G        

 

                    Urine pH measurement by test strip           7              

     5-9        

 

                    Specific gravity of urine by test strip           1.010     

          1.016-1.022  

      

 

                          Urine protein assay by test strip, semi-quantitative  

         NEGATIVE         

                                        NEGATIVE        

 

                    Urine glucose detection by automated test strip           NE

GATIVE            

NEGATIVE        

 

                          Erythrocytes detection in urine sediment by light micr

oscopy           NEGATIVE 

                                        NEGATIVE        

 

                    Urine ketones detection by automated test strip           NE

GATIVE            

NEGATIVE        

 

                    Urine nitrite detection by test strip           NEGATIVE    

        NEGATIVE    

    

 

                    Urine total bilirubin detection by test strip           NEGA

TIVE            

NEGATIVE        

 

                          Urine urobilinogen measurement by automated test strip

 (mass/volume)           

NORMAL                                  NORMAL        

 

                    Urine leukocyte esterase detection by dipstick           NEG

ATIVE            

NEGATIVE        

 

                                        Automated urine sediment erythrocyte cou

nt by microscopy (number/high power 

field)                    NONE                      NRG        

 

                                        Automated urine sediment leukocyte count

 by microscopy (number/high power field)

                          NONE                      NRG        

 

                          Bacteria detection in urine sediment by light microsco

py           NEGATIVE     

                                        NRG        

 

                                        Squamous epithelial cells detection in u

rine sediment by light microscopy       

                          RARE                      NRG        

 

                          Crystals detection in urine sediment by light microsco

py           NONE         

                                        NRG        

 

                    Casts detection in urine sediment by light microscopy       

    NONE                

NRG        

 

                          Mucus detection in urine sediment by light microscopy 

          NEGATIVE        

                                        NRG        

 

                    Complete urinalysis with reflex to culture           NO     

             NRG        

 

                                        Urine drug screening test - 19 05:

29         

 

                    Urine phencyclidine detection by screening method           

NEGATIVE            

NEGATIVE        

 

                          Urine benzodiazepines detection by screening method   

        NEGATIVE          

                                        NEGATIVE        

 

                    Urine cocaine detection           NEGATIVE            NEGATI

VE        

 

                    Urine amphetamines detection by screening method           N

EGATIVE            

NEGATIVE        

 

                          Urine methamphetamine detection by screening method   

        NEGATIVE          

                                        NEGATIVE        

 

                    Urine cannabinoids detection by screening method           N

EGATIVE            

NEGATIVE        

 

                    Urine opiates detection by screening method           NEGATI

VE            

NEGATIVE        

 

                    Urine barbiturates detection           NEGATIVE            N

EGATIVE        

 

                          Screening urine tricyclic antidepressants detection   

        NEGATIVE          

                                        NEGATIVE        

 

                    Urine methadone detection by screening method           NEGA

TIVE            

NEGATIVE        

 

                    Urine oxycodone detection           NEGATIVE            NEGA

TIVE        

 

                    Urine propoxyphene detection           NEGATIVE            N

EGATIVE        

 

                                        Complete blood count (CBC) with automate

d white blood cell (WBC) differential - 

19 05:40         

 

                          Blood leukocytes automated count (number/volume)      

     6.3 10*3/uL          

                                        4.3-11.0        

 

                          Blood erythrocytes automated count (number/volume)    

       4.95 10*6/uL       

                                        4.35-5.85        

 

                    Venous blood hemoglobin measurement (mass/volume)           

15.0 g/dL           

13.3-17.7        

 

                    Blood hematocrit (volume fraction)           43 %           

     40-54        

 

                    Automated erythrocyte mean corpuscular volume           87 [

foz_us]           

80-99        

 

                                        Automated erythrocyte mean corpuscular h

emoglobin (mass per erythrocyte)        

                          30 pg                     25-34        

 

                                        Automated erythrocyte mean corpuscular h

emoglobin concentration measurement 

(mass/volume)             35 g/dL                   32-36        

 

                    Automated erythrocyte distribution width ratio           13.

8 %              10.0-

14.5        

 

                    Automated blood platelet count (count/volume)           296 

10*3/uL           

130-400        

 

                          Automated blood platelet mean volume measurement      

     10.5 [foz_us]        

                                        7.4-10.4        

 

                    Automated blood neutrophils/100 leukocytes           42 %   

             42-75       

 

 

                    Automated blood lymphocytes/100 leukocytes           41 %   

             12-44       

 

 

                    Blood monocytes/100 leukocytes           11 %               

 0-12        

 

                    Automated blood eosinophils/100 leukocytes           5 %    

             0-10        

 

                    Automated blood basophils/100 leukocytes           1 %      

           0-10        

 

                    Blood neutrophils automated count (number/volume)           

2.6 10*3            

1.8-7.8        

 

                    Blood lymphocytes automated count (number/volume)           

2.5 10*3            

1.0-4.0        

 

                    Blood monocytes automated count (number/volume)           0.

7 10*3            

0.0-1.0        

 

                    Automated eosinophil count           0.3 10*3/uL           0

.0-0.3        

 

                    Automated blood basophil count (count/volume)           0.0 

10*3/uL           

0.0-0.1        

 

                                        Comprehensive metabolic panel - 19

 05:40         

 

                          Serum or plasma sodium measurement (moles/volume)     

      141 mmol/L          

                                        135-145        

 

                          Serum or plasma potassium measurement (moles/volume)  

         3.9 mmol/L       

                                        3.6-5.0        

 

                          Serum or plasma chloride measurement (moles/volume)   

        108 mmol/L        

                                                

 

                    Carbon dioxide           21 mmol/L           21-32        

 

                          Serum or plasma anion gap determination (moles/volume)

           12 mmol/L      

                                        5-14        

 

                          Serum or plasma urea nitrogen measurement (mass/volume

)           9 mg/dL       

                                        7-18        

 

                          Serum or plasma creatinine measurement (mass/volume)  

         0.81 mg/dL       

                                        0.60-1.30        

 

                    Serum or plasma urea nitrogen/creatinine mass ratio         

  11                  NRG 

       

 

                                        Serum or plasma creatinine measurement w

ith calculation of estimated glomerular 

filtration rate           >                         NRG        

 

                    Serum or plasma glucose measurement (mass/volume)           

104 mg/dL           

        

 

                    Serum or plasma calcium measurement (mass/volume)           

9.2 mg/dL           

8.5-10.1        

 

                          Serum or plasma total bilirubin measurement (mass/volu

me)           0.5 mg/dL   

                                        0.1-1.0        

 

                                        Serum or plasma alkaline phosphatase gary

surement (enzymatic activity/volume)    

                          84 U/L                            

 

                                        Serum or plasma aspartate aminotransfera

se measurement (enzymatic 

activity/volume)           17 U/L                    5-34        

 

                                        Serum or plasma alanine aminotransferase

 measurement (enzymatic activity/volume)

                          10 U/L                    0-55        

 

                    Serum or plasma protein measurement (mass/volume)           

6.8 g/dL            

6.4-8.2        

 

                    Serum or plasma albumin measurement (mass/volume)           

4.2 g/dL            

3.2-4.5        

 

                    CALCIUM CORRECTED           9.0 mg/dL           8.5-10.1    

    

 

                                        Serum or plasma thyrotropin measurement 

by detection limit <=0.05 miu/l 

(units/volume) - 19 05:40         

 

                                        Serum or plasma thyrotropin measurement 

by detection limit <=0.05 miu/l 

(units/volume)            2.11 u[iU]/mL             0.35-4.94        

 

                                        Serum or plasma salicylates measurement 

(mass/volume) - 19 05:40         

 

                          Serum or plasma salicylates measurement (mass/volume) 

          < mg/dL         

                                        5.0-20.0        

 

                                        Serum or plasma acetaminophen measuremen

t (mass/volume) - 19 05:40        

 

 

                          Serum or plasma acetaminophen measurement (mass/volume

)           < ug/mL       

                                        10-30        

 

                                        Serum or plasma ethanol measurement (mas

s/volume) - 19 05:40         

 

                    Serum or plasma ethanol measurement (mass/volume)           

< mg/dL             

<10        

 

                                        LITHIUM LEVEL - 19 05:40         

 

                    Blood lithium measurement (moles/volume)           0.2 %    

           0.5-1.5      

  

 

                                        Complete urinalysis with reflex to cultu

re - 10/10/19 15:58         

 

                    Urine color determination           YELLOW              NRG 

       

 

                    Urine clarity determination           CLEAR               NR

G        

 

                    Urine pH measurement by test strip           5              

     5-9        

 

                    Specific gravity of urine by test strip           1.020     

          1.016-1.022  

      

 

                    Urine protein assay by test strip, semi-quantitative        

   1+                  

NEGATIVE        

 

                    Urine glucose detection by automated test strip           NE

GATIVE            

NEGATIVE        

 

                          Erythrocytes detection in urine sediment by light micr

oscopy           NEGATIVE 

                                        NEGATIVE        

 

                    Urine ketones detection by automated test strip           NE

GATIVE            

NEGATIVE        

 

                    Urine nitrite detection by test strip           NEGATIVE    

        NEGATIVE    

    

 

                    Urine total bilirubin detection by test strip           NEGA

TIVE            

NEGATIVE        

 

                          Urine urobilinogen measurement by automated test strip

 (mass/volume)           

NORMAL                                  NORMAL        

 

                    Urine leukocyte esterase detection by dipstick           NEG

ATIVE            

NEGATIVE        

 

                                        Automated urine sediment erythrocyte cou

nt by microscopy (number/high power 

field)                    NONE                      NRG        

 

                                        Automated urine sediment leukocyte count

 by microscopy (number/high power field)

                           [HPF]                    NRG        

 

                          Bacteria detection in urine sediment by light microsco

py           NEGATIVE     

                                        NRG        

 

                          Crystals detection in urine sediment by light microsco

py           NONE         

                                        NRG        

 

                    Casts detection in urine sediment by light microscopy       

    NONE                

NRG        

 

                          Mucus detection in urine sediment by light microscopy 

          SMALL           

                                        NRG        

 

                    Complete urinalysis with reflex to culture           NO     

             NRG        

 

                                        Urine drug screening test - 10/10/19 15:

58         

 

                    Urine phencyclidine detection by screening method           

NEGATIVE            

NEGATIVE        

 

                          Urine benzodiazepines detection by screening method   

        NEGATIVE          

                                        NEGATIVE        

 

                    Urine cocaine detection           NEGATIVE            NEGATI

VE        

 

                    Urine amphetamines detection by screening method           N

EGATIVE            

NEGATIVE        

 

                          Urine methamphetamine detection by screening method   

        NEGATIVE          

                                        NEGATIVE        

 

                    Urine cannabinoids detection by screening method           N

EGATIVE            

NEGATIVE        

 

                    Urine opiates detection by screening method           NEGATI

VE            

NEGATIVE        

 

                    Urine barbiturates detection           NEGATIVE            N

EGATIVE        

 

                          Screening urine tricyclic antidepressants detection   

        NEGATIVE          

                                        NEGATIVE        

 

                    Urine methadone detection by screening method           NEGA

TIVE            

NEGATIVE        

 

                    Urine oxycodone detection           NEGATIVE            NEGA

TIVE        

 

                    Urine propoxyphene detection           NEGATIVE            N

EGATIVE        

 

                                        Complete blood count (CBC) with automate

d white blood cell (WBC) differential - 

10/10/19 16:20         

 

                          Blood leukocytes automated count (number/volume)      

     5.8 10*3/uL          

                                        4.3-11.0        

 

                          Blood erythrocytes automated count (number/volume)    

       4.26 10*6/uL       

                                        4.35-5.85        

 

                    Venous blood hemoglobin measurement (mass/volume)           

13.4 g/dL           

13.3-17.7        

 

                    Blood hematocrit (volume fraction)           38 %           

     40-54        

 

                    Automated erythrocyte mean corpuscular volume           89 [

foz_us]           

80-99        

 

                                        Automated erythrocyte mean corpuscular h

emoglobin (mass per erythrocyte)        

                          32 pg                     25-34        

 

                                        Automated erythrocyte mean corpuscular h

emoglobin concentration measurement 

(mass/volume)             35 g/dL                   32-36        

 

                    Automated erythrocyte distribution width ratio           13.

4 %              10.0-

14.5        

 

                    Automated blood platelet count (count/volume)           258 

10*3/uL           

130-400        

 

                          Automated blood platelet mean volume measurement      

     10.2 [foz_us]        

                                        7.4-10.4        

 

                    Automated blood neutrophils/100 leukocytes           53 %   

             42-75       

 

 

                    Automated blood lymphocytes/100 leukocytes           33 %   

             12-44       

 

 

                    Blood monocytes/100 leukocytes           10 %               

 0-12        

 

                    Automated blood eosinophils/100 leukocytes           3 %    

             0-10        

 

                    Automated blood basophils/100 leukocytes           1 %      

           0-10        

 

                    Blood neutrophils automated count (number/volume)           

3.0 10*3            

1.8-7.8        

 

                    Blood lymphocytes automated count (number/volume)           

1.9 10*3            

1.0-4.0        

 

                    Blood monocytes automated count (number/volume)           0.

6 10*3            

0.0-1.0        

 

                    Automated eosinophil count           0.2 10*3/uL           0

.0-0.3        

 

                    Automated blood basophil count (count/volume)           0.1 

10*3/uL           

0.0-0.1        

 

                                        Comprehensive metabolic panel - 10/10/19

 16:20         

 

                          Serum or plasma sodium measurement (moles/volume)     

      135 mmol/L          

                                        135-145        

 

                          Serum or plasma potassium measurement (moles/volume)  

         3.9 mmol/L       

                                        3.6-5.0        

 

                          Serum or plasma chloride measurement (moles/volume)   

        105 mmol/L        

                                                

 

                    Carbon dioxide           24 mmol/L           21-32        

 

                          Serum or plasma anion gap determination (moles/volume)

           6 mmol/L       

                                        5-14        

 

                          Serum or plasma urea nitrogen measurement (mass/volume

)           11 mg/dL      

                                        7-18        

 

                          Serum or plasma creatinine measurement (mass/volume)  

         0.84 mg/dL       

                                        0.60-1.30        

 

                    Serum or plasma urea nitrogen/creatinine mass ratio         

  13                  NRG 

       

 

                                        Serum or plasma creatinine measurement w

ith calculation of estimated glomerular 

filtration rate           >                         NRG        

 

                    Serum or plasma glucose measurement (mass/volume)           

100 mg/dL           

        

 

                    Serum or plasma calcium measurement (mass/volume)           

8.5 mg/dL           

8.5-10.1        

 

                          Serum or plasma total bilirubin measurement (mass/volu

me)           0.6 mg/dL   

                                        0.1-1.0        

 

                                        Serum or plasma alkaline phosphatase gary

surement (enzymatic activity/volume)    

                          82 U/L                            

 

                                        Serum or plasma aspartate aminotransfera

se measurement (enzymatic 

activity/volume)           12 U/L                    5-34        

 

                                        Serum or plasma alanine aminotransferase

 measurement (enzymatic activity/volume)

                          11 U/L                    0-55        

 

                    Serum or plasma protein measurement (mass/volume)           

6.2 g/dL            

6.4-8.2        

 

                    Serum or plasma albumin measurement (mass/volume)           

3.8 g/dL            

3.2-4.5        

 

                    CALCIUM CORRECTED           8.7 mg/dL           8.5-10.1    

    

 

                                        Serum or plasma salicylates measurement 

(mass/volume) - 10/10/19 16:20         

 

                          Serum or plasma salicylates measurement (mass/volume) 

          < mg/dL         

                                        5.0-20.0        

 

                                        Serum or plasma acetaminophen measuremen

t (mass/volume) - 10/10/19 16:20        

 

 

                          Serum or plasma acetaminophen measurement (mass/volume

)           < ug/mL       

                                        10-30        

 

                                        Serum or plasma ethanol measurement (mas

s/volume) - 10/10/19 16:20         

 

                    Serum or plasma ethanol measurement (mass/volume)           

< mg/dL             

<10        

 

                                        Chlamydia DNA amp probe, urine - 10/10/1

9 16:46         

 

                    Chlamydia DNA amp probe, urine           Not Detected       

     Not Detected   

     

 

                                        Urine Neisseria gonorrhoeae DNA assay - 

10/10/19 16:46         

 

                    Gonorrhea amp DNA-urine           Not Detected            No

t Detected        

 

                                        Serum reagin antibody assay (units/volum

e) by [facility] - 10/10/19 16:46         

 

                          Serum reagin antibody assay (units/volume) by [facility]     

      Non-Reactive        

                                        Non-Reactive        

 

                                        Urine drug screening test - 10/20/19 07:

11         

 

                    Urine phencyclidine detection by screening method           

NEGATIVE            

NEGATIVE        

 

                          Urine benzodiazepines detection by screening method   

        NEGATIVE          

                                        NEGATIVE        

 

                    Urine cocaine detection           NEGATIVE            NEGATI

VE        

 

                    Urine amphetamines detection by screening method           N

EGATIVE            

NEGATIVE        

 

                          Urine methamphetamine detection by screening method   

        NEGATIVE          

                                        NEGATIVE        

 

                    Urine cannabinoids detection by screening method           N

EGATIVE            

NEGATIVE        

 

                    Urine opiates detection by screening method           NEGATI

VE            

NEGATIVE        

 

                    Urine barbiturates detection           NEGATIVE            N

EGATIVE        

 

                          Screening urine tricyclic antidepressants detection   

        NEGATIVE          

                                        NEGATIVE        

 

                    Urine methadone detection by screening method           NEGA

TIVE            

NEGATIVE        

 

                    Urine oxycodone detection           NEGATIVE            NEGA

TIVE        

 

                    Urine propoxyphene detection           NEGATIVE            N

EGATIVE        

 

                                        Complete blood count (CBC) with automate

d white blood cell (WBC) differential - 

10/20/19 07:12         

 

                          Blood leukocytes automated count (number/volume)      

     6.9 10*3/uL          

                                        4.3-11.0        

 

                          Blood erythrocytes automated count (number/volume)    

       4.89 10*6/uL       

                                        4.35-5.85        

 

                    Venous blood hemoglobin measurement (mass/volume)           

15.1 g/dL           

13.3-17.7        

 

                    Blood hematocrit (volume fraction)           44 %           

     40-54        

 

                    Automated erythrocyte mean corpuscular volume           90 [

foz_us]           

80-99        

 

                                        Automated erythrocyte mean corpuscular h

emoglobin (mass per erythrocyte)        

                          31 pg                     25-34        

 

                                        Automated erythrocyte mean corpuscular h

emoglobin concentration measurement 

(mass/volume)             34 g/dL                   32-36        

 

                    Automated erythrocyte distribution width ratio           14.

5 %              10.0-

14.5        

 

                    Automated blood platelet count (count/volume)           247 

10*3/uL           

130-400        

 

                          Automated blood platelet mean volume measurement      

     10.4 [foz_us]        

                                        7.4-10.4        

 

                    Automated blood neutrophils/100 leukocytes           48 %   

             42-75       

 

 

                    Automated blood lymphocytes/100 leukocytes           35 %   

             12-44       

 

 

                    Blood monocytes/100 leukocytes           10 %               

 0-12        

 

                    Automated blood eosinophils/100 leukocytes           7 %    

             0-10        

 

                    Automated blood basophils/100 leukocytes           1 %      

           0-10        

 

                    Blood neutrophils automated count (number/volume)           

3.3 10*3            

1.8-7.8        

 

                    Blood lymphocytes automated count (number/volume)           

2.4 10*3            

1.0-4.0        

 

                    Blood monocytes automated count (number/volume)           0.

7 10*3            

0.0-1.0        

 

                    Automated eosinophil count           0.5 10*3/uL           0

.0-0.3        

 

                    Automated blood basophil count (count/volume)           0.1 

10*3/uL           

0.0-0.1        

 

                                        Comprehensive metabolic panel - 10/20/19

 07:12         

 

                          Serum or plasma sodium measurement (moles/volume)     

      136 mmol/L          

                                        135-145        

 

                          Serum or plasma potassium measurement (moles/volume)  

         4.8 mmol/L       

                                        3.6-5.0        

 

                          Serum or plasma chloride measurement (moles/volume)   

        102 mmol/L        

                                                

 

                    Carbon dioxide           22 mmol/L           21-32        

 

                          Serum or plasma anion gap determination (moles/volume)

           12 mmol/L      

                                        5-14        

 

                          Serum or plasma urea nitrogen measurement (mass/volume

)           14 mg/dL      

                                        7-18        

 

                          Serum or plasma creatinine measurement (mass/volume)  

         0.95 mg/dL       

                                        0.60-1.30        

 

                    Serum or plasma urea nitrogen/creatinine mass ratio         

  15                  NRG 

       

 

                                        Serum or plasma creatinine measurement w

ith calculation of estimated glomerular 

filtration rate           >                         NRG        

 

                    Serum or plasma glucose measurement (mass/volume)           

106 mg/dL           

        

 

                    Serum or plasma calcium measurement (mass/volume)           

9.2 mg/dL           

8.5-10.1        

 

                          Serum or plasma total bilirubin measurement (mass/volu

me)           0.3 mg/dL   

                                        0.1-1.0        

 

                                        Serum or plasma alkaline phosphatase gary

surement (enzymatic activity/volume)    

                          99 U/L                            

 

                                        Serum or plasma aspartate aminotransfera

se measurement (enzymatic 

activity/volume)           18 U/L                    5-34        

 

                                        Serum or plasma alanine aminotransferase

 measurement (enzymatic activity/volume)

                          20 U/L                    0-55        

 

                    Serum or plasma protein measurement (mass/volume)           

7.0 g/dL            

6.4-8.2        

 

                    Serum or plasma albumin measurement (mass/volume)           

4.3 g/dL            

3.2-4.5        

 

                    CALCIUM CORRECTED           9.0 mg/dL           8.5-10.1    

    

 

                                        Serum or plasma salicylates measurement 

(mass/volume) - 10/20/19 07:12         

 

                          Serum or plasma salicylates measurement (mass/volume) 

          < mg/dL         

                                        5.0-20.0        

 

                                        Serum or plasma acetaminophen measuremen

t (mass/volume) - 10/20/19 07:12        

 

 

                          Serum or plasma acetaminophen measurement (mass/volume

)           < ug/mL       

                                        10-30        

 

                                        Serum or plasma ethanol measurement (mas

s/volume) - 10/20/19 07:12         

 

                    Serum or plasma ethanol measurement (mass/volume)           

< mg/dL             

<10        

 

                                        TSH (REFLEX FREE T4 IF ABNORMAL) - 10/21

/19 05:18         

 

                    TSH                 1.881 uIU/mL           0.400-4.000      

  

 

                                        Complete blood count (CBC) with automate

d white blood cell (WBC) differential - 

20 13:00         

 

                          Blood leukocytes automated count (number/volume)      

     6.7 10*3/uL          

                                        4.3-11.0        

 

                          Blood erythrocytes automated count (number/volume)    

       5.23 10*6/uL       

                                        4.35-5.85        

 

                    Venous blood hemoglobin measurement (mass/volume)           

15.9 g/dL           

13.3-17.7        

 

                    Blood hematocrit (volume fraction)           46 %           

     40-54        

 

                    Automated erythrocyte mean corpuscular volume           88 [

foz_us]           

80-99        

 

                                        Automated erythrocyte mean corpuscular h

emoglobin (mass per erythrocyte)        

                          30 pg                     25-34        

 

                                        Automated erythrocyte mean corpuscular h

emoglobin concentration measurement 

(mass/volume)             35 g/dL                   32-36        

 

                    Automated erythrocyte distribution width ratio           13.

5 %              10.0-

14.5        

 

                    Automated blood platelet count (count/volume)           253 

10*3/uL           

130-400        

 

                          Automated blood platelet mean volume measurement      

     9.5 [foz_us]         

                                        7.4-10.4        

 

                    Automated blood neutrophils/100 leukocytes           63 %   

             42-75       

 

 

                    Automated blood lymphocytes/100 leukocytes           25 %   

             12-44       

 

 

                    Blood monocytes/100 leukocytes           7 %                

 0-12        

 

                    Automated blood eosinophils/100 leukocytes           4 %    

             0-10        

 

                    Automated blood basophils/100 leukocytes           1 %      

           0-10        

 

                    Blood neutrophils automated count (number/volume)           

4.3 10*3            

1.8-7.8        

 

                    Blood lymphocytes automated count (number/volume)           

1.7 10*3            

1.0-4.0        

 

                    Blood monocytes automated count (number/volume)           0.

5 10*3            

0.0-1.0        

 

                    Automated eosinophil count           0.3 10*3/uL           0

.0-0.3        

 

                    Automated blood basophil count (count/volume)           0.1 

10*3/uL           

0.0-0.1        

 

                                        Whole blood basic metabolic panel -  13:00         

 

                          Serum or plasma sodium measurement (moles/volume)     

      139 mmol/L          

                                        135-145        

 

                          Serum or plasma potassium measurement (moles/volume)  

         4.2 mmol/L       

                                        3.6-5.0        

 

                          Serum or plasma chloride measurement (moles/volume)   

        105 mmol/L        

                                                

 

                    Carbon dioxide           26 mmol/L           21-32        

 

                          Serum or plasma anion gap determination (moles/volume)

           8 mmol/L       

                                        5-14        

 

                          Serum or plasma urea nitrogen measurement (mass/volume

)           17 mg/dL      

                                        7-18        

 

                          Serum or plasma creatinine measurement (mass/volume)  

         1.18 mg/dL       

                                        0.60-1.30        

 

                    Serum or plasma urea nitrogen/creatinine mass ratio         

  14                  NRG 

       

 

                                        Serum or plasma creatinine measurement w

ith calculation of estimated glomerular 

filtration rate           >                         NRG        

 

                    Serum or plasma glucose measurement (mass/volume)           

105 mg/dL           

        

 

                    Serum or plasma calcium measurement (mass/volume)           

9.2 mg/dL           

8.5-10.1        

 

                                        Complete blood count (CBC) with automate

d white blood cell (WBC) differential - 

20 21:00         

 

                          Blood leukocytes automated count (number/volume)      

     5.4 10*3/uL          

                                        4.3-11.0        

 

                          Blood erythrocytes automated count (number/volume)    

       4.26 10*6/uL       

                                        4.35-5.85        

 

                    Venous blood hemoglobin measurement (mass/volume)           

13.1 g/dL           

13.3-17.7        

 

                    Blood hematocrit (volume fraction)           38 %           

     40-54        

 

                    Automated erythrocyte mean corpuscular volume           90 [

foz_us]           

80-99        

 

                                        Automated erythrocyte mean corpuscular h

emoglobin (mass per erythrocyte)        

                          31 pg                     25-34        

 

                                        Automated erythrocyte mean corpuscular h

emoglobin concentration measurement 

(mass/volume)             34 g/dL                   32-36        

 

                    Automated erythrocyte distribution width ratio           13.

7 %              10.0-

14.5        

 

                    Automated blood platelet count (count/volume)           231 

10*3/uL           

130-400        

 

                          Automated blood platelet mean volume measurement      

     10.8 [foz_us]        

                                        7.4-10.4        

 

                    Automated blood neutrophils/100 leukocytes           36 %   

             42-75       

 

 

                    Automated blood lymphocytes/100 leukocytes           43 %   

             12-44       

 

 

                    Blood monocytes/100 leukocytes           12 %               

 0-12        

 

                    Automated blood eosinophils/100 leukocytes           8 %    

             0-10        

 

                    Automated blood basophils/100 leukocytes           1 %      

           0-10        

 

                    Blood neutrophils automated count (number/volume)           

2.0 10*3            

1.8-7.8        

 

                    Blood lymphocytes automated count (number/volume)           

2.3 10*3            

1.0-4.0        

 

                    Blood monocytes automated count (number/volume)           0.

6 10*3            

0.0-1.0        

 

                    Automated eosinophil count           0.4 10*3/uL           0

.0-0.3        

 

                    Automated blood basophil count (count/volume)           0.1 

10*3/uL           

0.0-0.1        

 

                                        Comprehensive metabolic panel - 20

 21:00         

 

                          Serum or plasma sodium measurement (moles/volume)     

      141 mmol/L          

                                        135-145        

 

                          Serum or plasma potassium measurement (moles/volume)  

         4.2 mmol/L       

                                        3.6-5.0        

 

                          Serum or plasma chloride measurement (moles/volume)   

        109 mmol/L        

                                                

 

                    Carbon dioxide           19 mmol/L           21-32        

 

                          Serum or plasma anion gap determination (moles/volume)

           13 mmol/L      

                                        5-14        

 

                          Serum or plasma urea nitrogen measurement (mass/volume

)           13 mg/dL      

                                        7-18        

 

                          Serum or plasma creatinine measurement (mass/volume)  

         0.96 mg/dL       

                                        0.60-1.30        

 

                    Serum or plasma urea nitrogen/creatinine mass ratio         

  14                  NRG 

       

 

                                        Serum or plasma creatinine measurement w

ith calculation of estimated glomerular 

filtration rate           >                         NRG        

 

                    Serum or plasma glucose measurement (mass/volume)           

116 mg/dL           

        

 

                    Serum or plasma calcium measurement (mass/volume)           

8.5 mg/dL           

8.5-10.1        

 

                          Serum or plasma total bilirubin measurement (mass/volu

me)           0.3 mg/dL   

                                        0.1-1.0        

 

                                        Serum or plasma alkaline phosphatase gary

surement (enzymatic activity/volume)    

                          102 U/L                           

 

                                        Serum or plasma aspartate aminotransfera

se measurement (enzymatic 

activity/volume)           18 U/L                    5-34        

 

                                        Serum or plasma alanine aminotransferase

 measurement (enzymatic activity/volume)

                          15 U/L                    0-55        

 

                    Serum or plasma protein measurement (mass/volume)           

6.1 g/dL            

6.4-8.2        

 

                    Serum or plasma albumin measurement (mass/volume)           

3.6 g/dL            

3.2-4.5        

 

                    CALCIUM CORRECTED           8.8 mg/dL           8.5-10.1    

    

 

                                        Complete urinalysis with reflex to cultu

re - 20 21:13         

 

                    Urine color determination           YELLOW              NRG 

       

 

                    Urine clarity determination           CLEAR               NR

G        

 

                    Urine pH measurement by test strip           6.5            

     5-9        

 

                    Specific gravity of urine by test strip           <=        

          1.016-1.022     

   

 

                          Urine protein assay by test strip, semi-quantitative  

         NEGATIVE         

                                        NEGATIVE        

 

                    Urine glucose detection by automated test strip           NE

GATIVE            

NEGATIVE        

 

                          Erythrocytes detection in urine sediment by light micr

oscopy           NEGATIVE 

                                        NEGATIVE        

 

                    Urine ketones detection by automated test strip           NE

GATIVE            

NEGATIVE        

 

                    Urine nitrite detection by test strip           NEGATIVE    

        NEGATIVE    

    

 

                    Urine total bilirubin detection by test strip           NEGA

TIVE            

NEGATIVE        

 

                          Urine urobilinogen measurement by automated test strip

 (mass/volume)           

1.0 mg/dL                               < = 1.0        

 

                    Urine leukocyte esterase detection by dipstick           NEG

ATIVE            

NEGATIVE        

 

                                        Automated urine sediment erythrocyte cou

nt by microscopy (number/high power 

field)                    NONE                      NRG        

 

                                        Automated urine sediment leukocyte count

 by microscopy (number/high power field)

                          RARE                      NRG        

 

                          Bacteria detection in urine sediment by light microsco

py           TRACE        

                                        NRG        

 

                          Crystals detection in urine sediment by light microsco

py           NONE         

                                        NRG        

 

                    Casts detection in urine sediment by light microscopy       

    NONE                

NRG        

 

                          Mucus detection in urine sediment by light microscopy 

          NEGATIVE        

                                        NRG        

 

                    Complete urinalysis with reflex to culture           NO     

             NRG        

 

                                        Urine drug screening test - 20 21:

13         

 

                    Urine phencyclidine detection by screening method           

NEGATIVE            

NEGATIVE        

 

                          Urine benzodiazepines detection by screening method   

        NEGATIVE          

                                        NEGATIVE        

 

                    Urine cocaine detection           NEGATIVE            NEGATI

VE        

 

                    Urine amphetamines detection by screening method           P

OSITIVE            

NEGATIVE        

 

                          Urine methamphetamine detection by screening method   

        NEGATIVE          

                                        NEGATIVE        

 

                    Urine cannabinoids detection by screening method           N

EGATIVE            

NEGATIVE        

 

                    Urine opiates detection by screening method           NEGATI

VE            

NEGATIVE        

 

                    Urine barbiturates detection           NEGATIVE            N

EGATIVE        

 

                          Screening urine tricyclic antidepressants detection   

        NEGATIVE          

                                        NEGATIVE        

 

                    Urine methadone detection by screening method           NEGA

TIVE            

NEGATIVE        

 

                    Urine oxycodone detection           NEGATIVE            NEGA

TIVE        

 

                    Urine propoxyphene detection           NEGATIVE            N

EGATIVE        



                                                                                
                            



Encounters

      



                ACCT No.           Visit Date/Time           Discharge          

 Status         

             Pt. Type           Provider           Facility           Loc./Unit 

          

Complaint        

 

                [id number]           2019 13:15:00           2019 09:20:

00           DIS

                    Inpatient           PRASHANT WOODARD [facility]                               

 

             [id number]           2019 16:53:21                               

      Document 

Registration                                                                    

 

                    [id number]           2020 18:22:00            18:30:00        

                DIS             Emergency           JOLIE OBRIEN MD          

 [facility]           ER                        ALLERGIES        

 

                    [id number]           2020 20:58:00            21:37:00        

                DIS             Outpatient           PARKER BELLAMY        

   [facility]           ER                        ALLERGY ATTACK        

 

                    [id number]           2020 11:52:00            13:45:00        

                DIS             Emergency           PARKER BELLAMY         

  [facility]           ER                        DIZZINESS        

 

                    [id number]           10/20/2019 05:44:00           10/20/2

019 11:00:00        

                DIS             Emergency           JUN VALENTINE MD          

 [facility]           ER                        DEPRESSION        

 

                    [id number]           10/10/2019 14:59:00           10/10/2

019 19:39:00        

                DIS             Emergency           PARKER BELLAMY         

  [facility]           ER                        DEPRESSION        

 

                    [id number]           2019 05:17:00            11:03:00        

                DIS             Emergency           VALENTINE FAROOQ, PRASHANT BAKER    

       [facility] 

[facility]           ER                        SUICIDAL,WANTS 

TO HARM SELF 

       

 

                    [id number]           2019 11:50:00            15:25:00        

                DIS             Emergency           PARKER BELLAMY         

  [facility]           ER                        DEPRESSION        

 

                    [id number]           2019 11:16:00            15:37:00        

                DIS             Emergency           PARKER BELLAMY         

  [facility]           ER                        SUICIDAL IDEATION      

  

 

                    [id number]           10/20/2019 13:34:00           10/28/201

9 13:10:00          

                DIS             Inpatient           WONGCARISSA ROACH           [facility]                                 

 

                [id number]           2020 15:45:00           2020 23:59:

59           [facility]

                Outpatient           MANNY ISAACS LAC                          

 [facility]

## 2020-09-30 ENCOUNTER — HOSPITAL ENCOUNTER (OUTPATIENT)
Dept: HOSPITAL 75 - PREOP | Age: 65
Discharge: HOME | End: 2020-09-30
Attending: SURGERY
Payer: MEDICARE

## 2020-09-30 VITALS — BODY MASS INDEX: 24.74 KG/M2 | WEIGHT: 172.84 LBS | HEIGHT: 70 IN

## 2020-09-30 VITALS — SYSTOLIC BLOOD PRESSURE: 116 MMHG | DIASTOLIC BLOOD PRESSURE: 72 MMHG

## 2020-09-30 DIAGNOSIS — Z01.818: Primary | ICD-10-CM

## 2020-09-30 PROCEDURE — 87081 CULTURE SCREEN ONLY: CPT

## 2020-10-06 ENCOUNTER — HOSPITAL ENCOUNTER (OUTPATIENT)
Dept: HOSPITAL 75 - LABNPT | Age: 65
End: 2020-10-06
Attending: SURGERY
Payer: MEDICARE

## 2020-10-06 DIAGNOSIS — Z01.812: Primary | ICD-10-CM

## 2020-10-06 DIAGNOSIS — K40.20: ICD-10-CM

## 2020-10-06 DIAGNOSIS — Z20.828: ICD-10-CM

## 2020-10-06 PROCEDURE — 87635 SARS-COV-2 COVID-19 AMP PRB: CPT

## 2020-10-07 ENCOUNTER — HOSPITAL ENCOUNTER (OUTPATIENT)
Dept: HOSPITAL 75 - SDC | Age: 65
Discharge: HOME | End: 2020-10-07
Attending: SURGERY
Payer: MEDICARE

## 2020-10-07 VITALS — DIASTOLIC BLOOD PRESSURE: 61 MMHG | SYSTOLIC BLOOD PRESSURE: 108 MMHG

## 2020-10-07 VITALS — SYSTOLIC BLOOD PRESSURE: 116 MMHG | DIASTOLIC BLOOD PRESSURE: 73 MMHG

## 2020-10-07 VITALS — WEIGHT: 172.84 LBS | HEIGHT: 70 IN | BODY MASS INDEX: 24.74 KG/M2

## 2020-10-07 VITALS — SYSTOLIC BLOOD PRESSURE: 125 MMHG | DIASTOLIC BLOOD PRESSURE: 70 MMHG

## 2020-10-07 VITALS — DIASTOLIC BLOOD PRESSURE: 66 MMHG | SYSTOLIC BLOOD PRESSURE: 110 MMHG

## 2020-10-07 VITALS — SYSTOLIC BLOOD PRESSURE: 110 MMHG | DIASTOLIC BLOOD PRESSURE: 63 MMHG

## 2020-10-07 VITALS — SYSTOLIC BLOOD PRESSURE: 128 MMHG | DIASTOLIC BLOOD PRESSURE: 72 MMHG

## 2020-10-07 VITALS — DIASTOLIC BLOOD PRESSURE: 59 MMHG | SYSTOLIC BLOOD PRESSURE: 101 MMHG

## 2020-10-07 VITALS — SYSTOLIC BLOOD PRESSURE: 104 MMHG | DIASTOLIC BLOOD PRESSURE: 79 MMHG

## 2020-10-07 VITALS — DIASTOLIC BLOOD PRESSURE: 74 MMHG | SYSTOLIC BLOOD PRESSURE: 110 MMHG

## 2020-10-07 VITALS — DIASTOLIC BLOOD PRESSURE: 73 MMHG | SYSTOLIC BLOOD PRESSURE: 116 MMHG

## 2020-10-07 VITALS — SYSTOLIC BLOOD PRESSURE: 119 MMHG | DIASTOLIC BLOOD PRESSURE: 76 MMHG

## 2020-10-07 DIAGNOSIS — F20.9: ICD-10-CM

## 2020-10-07 DIAGNOSIS — Z80.0: ICD-10-CM

## 2020-10-07 DIAGNOSIS — K41.30: Primary | ICD-10-CM

## 2020-10-07 DIAGNOSIS — F41.9: ICD-10-CM

## 2020-10-07 DIAGNOSIS — F32.9: ICD-10-CM

## 2020-10-07 DIAGNOSIS — F17.210: ICD-10-CM

## 2020-10-07 DIAGNOSIS — Z79.899: ICD-10-CM

## 2020-10-07 DIAGNOSIS — K40.00: ICD-10-CM

## 2020-10-07 LAB
BARBITURATES UR QL: NEGATIVE
BENZODIAZ UR QL SCN: NEGATIVE
COCAINE UR QL: NEGATIVE
METHADONE UR QL SCN: NEGATIVE
METHAMPHETAMINE SCREEN URINE S: NEGATIVE
OPIATES UR QL SCN: NEGATIVE
OXYCODONE UR QL: NEGATIVE
PROPOXYPH UR QL: NEGATIVE
TRICYCLICS UR QL SCN: NEGATIVE

## 2020-10-07 PROCEDURE — 49659 UNLSTD LAPS PX HRNAP HRNRPHY: CPT

## 2020-10-07 PROCEDURE — 80306 DRUG TEST PRSMV INSTRMNT: CPT

## 2020-10-07 PROCEDURE — 49650 LAP ING HERNIA REPAIR INIT: CPT

## 2020-10-07 RX ADMIN — SODIUM CHLORIDE, SODIUM LACTATE, POTASSIUM CHLORIDE, AND CALCIUM CHLORIDE PRN MLS/HR: 600; 310; 30; 20 INJECTION, SOLUTION INTRAVENOUS at 08:42

## 2020-10-07 RX ADMIN — SODIUM CHLORIDE, SODIUM LACTATE, POTASSIUM CHLORIDE, AND CALCIUM CHLORIDE PRN MLS/HR: 600; 310; 30; 20 INJECTION, SOLUTION INTRAVENOUS at 14:24

## 2020-10-07 RX ADMIN — SODIUM CHLORIDE, SODIUM LACTATE, POTASSIUM CHLORIDE, AND CALCIUM CHLORIDE PRN MLS/HR: 600; 310; 30; 20 INJECTION, SOLUTION INTRAVENOUS at 09:09

## 2020-10-07 NOTE — PROGRESS NOTE-POST OPERATIVE
Post-Operative Progess Note


Surgeon (s)/Assistant (s)


Surgeon


MARKOS CORREA DO


Assistant:  Meredith





Pre-Operative Diagnosis


B/L IH, incarcerated left





Post-Operative Diagnosis





B/L IH - both incarcerated and indirect


Right Incarcerated Femoral hernia





Procedure & Operative Findings


Date of Procedure


10/7/20


Procedure Performed/Findings


Laparoscopic B/L IH with mesh placement - robotic assisted


Laparoscopic Left femoral herniarraphy - robotic assisted








INDICATION FOR PROCEDURE:


The patient is a 65-year-old male, who has a large left inguinal hernia - 

incarcerated and a


right inguinal hernia.  He wanted to get this fixed robotically, so we scheduled

this.





FINDINGS:


The patient had a large left incarcerated inguinal hernia as well as a cord 

lipoma and 


an incarcerated right inguinal hernia; both looked to be indirect.  As well 

found an incarcerated


left femoral hernia.  Pictures were taken.  These were repaired.





PROCEDURE NOTE:


After informed consent was obtained, the patient was brought to the operating


room and placed on the operating table in a supine position.  He was sterilely


prepped and draped in a normal fashion.  Local lidocaine was used to infiltrate


the skin above the umbilicus.  I made an incision with #11 blade, carried down


to the skin into subcutaneous tissue and then deepened down the subcutaneous


tissue with Bovie electrocautery down to the fascia.  Fascia was incised with


Bovie electrocautery and bluntly entered the abdomen, swept a finger around,


placed 0 Vicryl figure-of-eight suture and placed limited trocar port under


direct visualization.  Created pneumoperitoneum, able to visualize the hernia


and took a picture of this and then placed two 8 mm ports about 10 cm on either


side of the midline port using a local lidocaine, 11 blade for stab incision and


then advanced the robotic port under direct visualization.  Once this was in, I


then placed the patient in a Trendelenburg and then placed the working


instruments, the fenestrated bipolar and the scissors.  I elected to start on 

the


left side for this inguinal hernia.  Started approximately 8 cm away


from the hernia defect, came across the peritoneum, freeing this up from and


then going across laterally starting from just about the midline.  The patient


incidentally appeared to have an incarcerated right inguinal hernia; both 

appeared


to be indirect.  We came across laterally all the way out about 15 to 16 cm, 

possibly


more and then carefully dissected the visceral peritoneum away and down and then


in the midline, went through the parietal side and dissected down to the pubic


tubercle, dissecting this down carefully pushing the peritoneum away, we were


able to then visualize the pubic tubercle and Joni's ligament.  I then did the

same thing


on the right side and went 2 cm posterior and at this point, we then had a 

critical view of the 


dissection, able to dissect all the way across the midline to the right side, 2 

cm posterior to the


Joni's ligament, able to then parietalize the vas deferens and spermatic 

vessels


right at the groove between Joni's and iliac vein and able to dissect, make


sure there was no peritoneum between those two, able to see both indirect hernia


spaces,  took a picture of this, looked at the femoral space.  Actually found an

incarcerated


right femoral hernia and took out the fat that was stuck in the hernia space.  

Able to see


both cord and cord structures.  There did not appear to be a direct hernia space

on the left


but may hve been one on the right.  I then reduced a cord lipoma on the left and

able to dissect 


out both indirect hernia scs; right and left.  I carried the posterior lateral 

dissection all the way out


on both sides and then placed a right and left 12 x 17 light Bard 3DMax mesh.  

It laid in nicely, 


covered all of the hernia defects and all of the area and it was above the 

peritoneum, sutured both at


the pubic tubercle with a 3-0 Vicryl suture and tied this off. I then sutured 

both meshes laterally


with 3-0 Vicryl suture.  The meshes both laid down nicely, then decreased the pn

eumoperitoneum 


to about 10 mmHg and then started from laterally and used a 2-0 barbed suture to

start closing the


peritoneum.  Used three of them to close the peritoneum all the way across in 

one transverse line; 


running to close the peritoneum.  This was closed nicely, took a picture


of the closure at this point, then removed all needles had switched to a suture


 from the scissors.  The patient was then placed back supine, removed all


ports under direct visualization, allowed pneumoperitoneum to escape and then


closed the supraumbilical incision, closing the fascia with 0 Vicryl suture


previously placed.  Copiously irrigated all incisions and then closed the two


small 8 mm incisions with two interrupted 4-0 undyed Monocryl subcuticular


stitches and closed the supraumbilical incision with three interrupted undyed


Monocryl subcuticular stitch.  Area was cleaned and dried.  Dermabond was


placed.  The patient tolerated the procedure.  The sponge, instrument and needle


counts were correct at the end of the case.  Dr. Harper assisted me in this case


helping to make incisions, close incisions, identify anatomy and passed


instrument and passed me the needles.


Anesthesia Type


GET





Estimated Blood Loss


Estimated blood loss (mL):  less than 20ml





Specimens/Packing


Specimens Removed


none











MARKOS CORREA DO                Oct 7, 2020 15:52

## 2020-10-07 NOTE — ANESTHESIA-GENERAL POST-OP
General


Patient Condition


Mental Status/LOC:  Same as Preop


Cardiovascular:  Satisfactory


Nausea/Vomiting:  Absent


Respiratory:  Satisfactory


Pain:  Controlled


Complications:  Absent





Post Op Complications


Complications


None





Follow Up Care/Instructions


Patient Instructions


None needed.





Anesthesia/Patient Condition


Patient Condition


Patient is doing well, no complaints, stable vital signs, no apparent adverse 

anesthesia problems.   


No complications reported per nursing.











MONTSERRAT DANIEL CRNA             Oct 7, 2020 15:52

## 2020-10-07 NOTE — NUR
TO AMB SURG FROM PAR PER CART. ALERT, DENIES PAIN. SKIN AFFIX INTACT TO X3 LAP ABD SURGICAL 
SITES, ICE PACK ON. PO FLUIDS PROVIDED.

## 2020-10-07 NOTE — DISCHARGE INST-SURGICAL
Discharge Inst-Surgical


Depart Medication/Instructions


New, Converted or Re-Newed RX:  RX Given to Pt/Family


Patient Instructions


Follow up Appt:


Make appointment for 1 week. [phone]





Instructions:


No lifting greater than 20 pounds.


No strenuous activity. 


May shower in 24 hours, no tub bath or soaking.


Use incentive spirometer at home as directed.


No Smoking





Skin/Wound Care:


May remove bandages in am.  You need to leave the Dermabond on incision it will 

fall off on it's own. 





Symptoms to Report:


Appetite Changes, Extremity Discoloration, Numbness/Tingling, Swelling 

Increased, Bleeding Excessive, Eyesight Changes, Pain Increased, Urine Color 

Change, Constipation(Persistent), Fever over 101 degree F, Pain/Pressure in 

chest, Urinating Difficulty, Cough Up/Vomit Blood, Heart Beat Irreg/Pounding, 

Pain/Pressure in jaw, Cramps in feet or legs, Lightheadedness, Pain/Pressure in 

shoulder, Diarrhea(Persistent), Memory Changes Suddenly, Questions/Concerns, 

Weight gain consecutive days, Dizziness/Fainting, Nausea/Vomiting, Shortness of 

Breath, Weight gain over 2 pounds








If questions or concerns contact your physician 


Or seek help at emergency department.





Activity


Activity Instructions:  Avoid Stress to Incision


Driving Instructions:  No Driving/Refer to Dr. Brasher


Discharge Diet:  Eat Small Frequent Meals


Diet After 24 Hours:  Clear Liquid if Nauseous


If Any Problems/Questions/Issu:  Contact Your Physician, Go to Emergency Room





Skin/Wound Care


Infection Signs and Symptoms:  Increased Redness, Foul Odor of Wound, Increased 

Drainage, Skin Itchy or Has a Rash, Increased Swelling, Temperature Above 101  F


Wound Care Comment:  


heating pad to shoulder or neck tonight for pain


Bathing Instructions:  Shower


Stitches/Staples/Dermabond Dis:  Dermabond


Ice Pack:  Ice On and Off Site











MARKOS CORREA DO                Oct 7, 2020 15:54

## 2020-10-07 NOTE — PROGRESS NOTE-PRE OPERATIVE
Pre-Operative Progress Note


H&P Reviewed


The H&P was reviewed, patient examined and no changes noted.


Time Seen by Provider:  08:10


Date H&P Reviewed:  Oct 7, 2020


Time H&P Reviewed:  08:11


Pre-Operative Diagnosis:  B/L IH, incarcerated left











MARKOS CORREA DO                Oct 7, 2020 08:13

## 2020-11-25 ENCOUNTER — HOSPITAL ENCOUNTER (OUTPATIENT)
Dept: HOSPITAL 75 - RAD | Age: 65
End: 2020-11-25
Attending: SURGERY
Payer: MEDICARE

## 2020-11-25 DIAGNOSIS — K40.90: Primary | ICD-10-CM

## 2020-11-25 DIAGNOSIS — N28.1: ICD-10-CM

## 2020-11-25 DIAGNOSIS — K76.89: ICD-10-CM

## 2020-11-25 LAB
BUN/CREAT SERPL: 10
CREAT SERPL-MCNC: 0.91 MG/DL (ref 0.6–1.3)
GFR SERPLBLD BASED ON 1.73 SQ M-ARVRAT: > 60 ML/MIN

## 2020-11-25 PROCEDURE — 82565 ASSAY OF CREATININE: CPT

## 2020-11-25 PROCEDURE — 36415 COLL VENOUS BLD VENIPUNCTURE: CPT

## 2020-11-25 PROCEDURE — 74177 CT ABD & PELVIS W/CONTRAST: CPT

## 2020-11-25 PROCEDURE — 84520 ASSAY OF UREA NITROGEN: CPT

## 2020-11-25 NOTE — DIAGNOSTIC IMAGING REPORT
PROCEDURE: 

CT abdomen and pelvis with contrast.



TECHNIQUE: 

Multiple contiguous axial images were obtained through the

abdomen and pelvis after administration of intravenous contrast.

Auto Exposure Controls were utilized during the CT exam to meet

ALARA standards for radiation dose reduction. All CT scans use

one or more of the following dose optimizing techniques:

automated exposure control, MA and/or KvP adjustment based on

patient size and exam type or iterative reconstruction.



INDICATION:  

Inguinal hernia. 



COMPARISON:  

No prior examinations are available for comparison. 



FINDINGS:

The vertically oriented tubular fluid extends from the deep left

inguinal ring inferiorly into the left hemiscrotum where there is

a partially visualized left-sided hydrocele. No herniation of

viscus is present. This is presumed incomplete obliteration of

the processus vaginalis. In axial plane just above the

hemiscrotum, this measures 5.6 cm transverse x 5.8 cm AP with the

process extending a cephalocaudal length of about 10 cm. On the

right, a similar but incomplete cystic structure measures a

diameter of 2.2 cm and terminates above the scrotum. No

herniation of viscus. The anterior abdominal wall appears

otherwise normal. There is a right hepatic lobe cyst. The

gallbladder is normal. No bile duct dilatation. The spleen,

adrenals, and pancreas are unremarkable. The kidneys are

unobstructed with a simple cyst as a benign finding off the lower

pole of the left kidney noted. There is no bowel obstruction or

ileus. There is air within the nondilated and nonacute appendix.

There are a few noninflamed sigmoid diverticula. The urinary

bladder was nearly empty at this study. There is a probable

previous TUR defect within the central prostate. The osseous

structures are nonacute.



IMPRESSION:  

1. No herniation of viscus. The left-sided vertically oriented

tubular fluid structure in the deep inguinal ring extends into

the fluid-containing scrotal sac on the left, likely reflective

of sequelae of failure of closure of the processus vaginalis. A

similar but incomplete finding on the right is noted.



2. No bowel, biliary, or urinary tract obstruction. Noninflamed

diverticulosis. Probable previous TUR to the prostate. Benign

hepatorenal cyst.



Dictated by: 



  Dictated on workstation # [id number]

## 2021-03-09 ENCOUNTER — HOSPITAL ENCOUNTER (EMERGENCY)
Dept: HOSPITAL 75 - ER | Age: 66
Discharge: LEFT BEFORE DISCHARGE | End: 2021-03-09
Payer: MEDICARE

## 2021-03-09 DIAGNOSIS — T78.40XA: Primary | ICD-10-CM

## 2021-04-28 ENCOUNTER — HOSPITAL ENCOUNTER (OUTPATIENT)
Dept: HOSPITAL 75 - ER | Age: 66
Setting detail: OBSERVATION
LOS: 2 days | Discharge: PSYCHIATRIC OR SUBSTANCE USE FACILITY | End: 2021-04-30
Attending: FAMILY MEDICINE | Admitting: FAMILY MEDICINE
Payer: MEDICARE

## 2021-04-28 VITALS — DIASTOLIC BLOOD PRESSURE: 77 MMHG | SYSTOLIC BLOOD PRESSURE: 109 MMHG

## 2021-04-28 VITALS — WEIGHT: 145.28 LBS | HEIGHT: 70 IN | BODY MASS INDEX: 20.8 KG/M2

## 2021-04-28 DIAGNOSIS — R45.851: Primary | ICD-10-CM

## 2021-04-28 DIAGNOSIS — F19.10: ICD-10-CM

## 2021-04-28 DIAGNOSIS — N40.0: ICD-10-CM

## 2021-04-28 DIAGNOSIS — Z79.899: ICD-10-CM

## 2021-04-28 DIAGNOSIS — F41.9: ICD-10-CM

## 2021-04-28 DIAGNOSIS — F17.210: ICD-10-CM

## 2021-04-28 DIAGNOSIS — Z20.822: ICD-10-CM

## 2021-04-28 DIAGNOSIS — F32.9: ICD-10-CM

## 2021-04-28 DIAGNOSIS — K40.90: ICD-10-CM

## 2021-04-28 LAB
ALBUMIN SERPL-MCNC: 4.8 GM/DL (ref 3.2–4.5)
ALP SERPL-CCNC: 88 U/L (ref 40–136)
ALT SERPL-CCNC: 20 U/L (ref 0–55)
AMORPH SED URNS QL MICRO: (no result) /LPF
APAP SERPL-MCNC: < 10 UG/ML (ref 10–30)
APTT PPP: (no result) S
BACTERIA #/AREA URNS HPF: (no result) /HPF
BARBITURATES UR QL: NEGATIVE
BASOPHILS # BLD AUTO: 0.1 10^3/UL (ref 0–0.1)
BASOPHILS NFR BLD AUTO: 1 % (ref 0–10)
BENZODIAZ UR QL SCN: NEGATIVE
BILIRUB SERPL-MCNC: 0.4 MG/DL (ref 0.1–1)
BILIRUB UR QL STRIP: (no result)
BUN/CREAT SERPL: 13
CALCIUM SERPL-MCNC: 9.8 MG/DL (ref 8.5–10.1)
CHLORIDE SERPL-SCNC: 104 MMOL/L (ref 98–107)
CO2 SERPL-SCNC: 21 MMOL/L (ref 21–32)
COCAINE UR QL: NEGATIVE
CREAT SERPL-MCNC: 1.28 MG/DL (ref 0.6–1.3)
EOSINOPHIL # BLD AUTO: 0.2 10^3/UL (ref 0–0.3)
EOSINOPHIL NFR BLD AUTO: 2 % (ref 0–10)
FIBRINOGEN PPP-MCNC: CLEAR MG/DL
GFR SERPLBLD BASED ON 1.73 SQ M-ARVRAT: 56 ML/MIN
GLUCOSE SERPL-MCNC: 120 MG/DL (ref 70–105)
GLUCOSE UR STRIP-MCNC: NEGATIVE MG/DL
HCT VFR BLD CALC: 45 % (ref 40–54)
HGB BLD-MCNC: 15.5 G/DL (ref 13.3–17.7)
HYALINE CASTS #/AREA URNS LPF: (no result) /LPF
KETONES UR QL STRIP: NEGATIVE
LEUKOCYTE ESTERASE UR QL STRIP: NEGATIVE
LYMPHOCYTES # BLD AUTO: 2 10^3/UL (ref 1–4)
LYMPHOCYTES NFR BLD AUTO: 21 % (ref 12–44)
MANUAL DIFFERENTIAL PERFORMED BLD QL: NO
MCH RBC QN AUTO: 31 PG (ref 25–34)
MCHC RBC AUTO-ENTMCNC: 35 G/DL (ref 32–36)
MCV RBC AUTO: 89 FL (ref 80–99)
METHADONE UR QL SCN: NEGATIVE
METHAMPHETAMINE SCREEN URINE S: NEGATIVE
MONOCYTES # BLD AUTO: 1.1 10^3/UL (ref 0–1)
MONOCYTES NFR BLD AUTO: 12 % (ref 0–12)
NEUTROPHILS # BLD AUTO: 6.1 10^3/UL (ref 1.8–7.8)
NEUTROPHILS NFR BLD AUTO: 65 % (ref 42–75)
NITRITE UR QL STRIP: NEGATIVE
OPIATES UR QL SCN: NEGATIVE
OXYCODONE UR QL: NEGATIVE
PH UR STRIP: 5.5 [PH] (ref 5–9)
PLATELET # BLD: 349 10^3/UL (ref 130–400)
PMV BLD AUTO: 10.9 FL (ref 9–12.2)
POTASSIUM SERPL-SCNC: 4.2 MMOL/L (ref 3.6–5)
PROPOXYPH UR QL: NEGATIVE
PROT SERPL-MCNC: 7.8 GM/DL (ref 6.4–8.2)
PROT UR QL STRIP: (no result)
RBC #/AREA URNS HPF: (no result) /HPF
SALICYLATES SERPL-MCNC: < 5 MG/DL (ref 5–20)
SODIUM SERPL-SCNC: 138 MMOL/L (ref 135–145)
SP GR UR STRIP: >=1.03 (ref 1.02–1.02)
TRICYCLICS UR QL SCN: NEGATIVE
WBC # BLD AUTO: 9.4 10^3/UL (ref 4.3–11)
WBC #/AREA URNS HPF: (no result) /HPF

## 2021-04-28 PROCEDURE — 80178 ASSAY OF LITHIUM: CPT

## 2021-04-28 PROCEDURE — 99283 EMERGENCY DEPT VISIT LOW MDM: CPT

## 2021-04-28 PROCEDURE — 85025 COMPLETE CBC W/AUTO DIFF WBC: CPT

## 2021-04-28 PROCEDURE — 80329 ANALGESICS NON-OPIOID 1 OR 2: CPT

## 2021-04-28 PROCEDURE — 80306 DRUG TEST PRSMV INSTRMNT: CPT

## 2021-04-28 PROCEDURE — 80053 COMPREHEN METABOLIC PANEL: CPT

## 2021-04-28 PROCEDURE — 36415 COLL VENOUS BLD VENIPUNCTURE: CPT

## 2021-04-28 PROCEDURE — 84443 ASSAY THYROID STIM HORMONE: CPT

## 2021-04-28 PROCEDURE — 80320 DRUG SCREEN QUANTALCOHOLS: CPT

## 2021-04-28 PROCEDURE — 81000 URINALYSIS NONAUTO W/SCOPE: CPT

## 2021-04-28 PROCEDURE — 87635 SARS-COV-2 COVID-19 AMP PRB: CPT

## 2021-04-28 PROCEDURE — 93005 ELECTROCARDIOGRAM TRACING: CPT

## 2021-04-28 NOTE — ED PSYCHOSOCIAL
General


Chief Complaint:  Suicidal Ideation Risk


Stated Complaint:  SUICIDAL IDEATIONS





History of Present Illness


Date Seen by Provider:  Apr 28, 2021


Time Seen by Provider:  20:30


Initial Comments


66-year-old  male presents via EMS after a 911 call with suicidal 

ideations.  The patient has longstanding history of bipolar disorder and was 

recently inpatient at [facility] in [facility], he was 

discharged 4 days ago.  He reports 2 weeks ago trying to overdose on heroin and 

methamphetamine and being taken to [place].  He denies any drug use since that 

attempt.  He is on lithium for his bipolar disorder and reports taking none 

since discharge 4 days ago.  He reports relationship issues with his daughter 

that has pushed him over the edge wanting him to kill himself. He reports "I 

have no reason to live, if I go home, I am killing myself"  He denies any 

auditory or visual hallucinations.  He denies having any access to drugs but has

friends who will bring them to him.


He has been inpatient at several facilities in the last few years: [facility], [facility], and was placed a few months ago in [facility] by

[facility]


Timing/Duration:  getting worse


Severity:  mild


Associated Symptoms:  impaired concentration, suicidal ideation





Allergies and Home Medications


Allergies


Coded Allergies:  


     No Known Drug Allergies (Unverified , 8/25/19)





Home Medications


Citalopram Hydrobromide 20 Mg Tablet, 40 MG PO DAILY, (Reported)


Hydrocodone/Acetaminophen 1 Each Tablet, 1 TAB PO Q6H


   Prescribed by: MARKOS CORREA on 10/7/20 1553


Lithium Carbonate 300 Mg Tablet, 300 MG PO TID, (Reported)


Trazodone HCl 100 Mg Tablet, 100 MG PO HS, (Reported)





Patient Home Medication List


Home Medication List Reviewed:  Yes





Review of Systems


Constitutional:  no symptoms reported, see HPI


Respiratory:  no symptoms reported, see HPI


Cardiovascular:  no symptoms reported, see HPI


Gastrointestinal:  no symptoms reported, see HPI


Genitourinary:  no symptoms reported, see HPI


Psychiatric/Neurological:  See HPI, Depressed, Emotional Problems





All Other Systems Reviewed


Negative Unless Noted:  Yes





Past Medical-Social-Family Hx


Past Med/Social Hx:  Reviewed Nursing Past Med/Soc Hx


Patient Social History


Drug of Choice:  meth


Type Used:  Cigarettes


2nd Hand Smoke Exposure:  No


Recent Hopitalizations:  No





Immunizations Up To Date


Tetanus Booster (TDap):  Unknown





Seasonal Allergies


Seasonal Allergies:  Yes





Past Medical History


Surgeries:  Yes (lymph node removed )


Prostatectomy


Respiratory:  No


Cardiac:  No


Neurological:  No


Genitourinary:  Yes


Benign Prostatic Hyperpl, Prostate Problems


Gastrointestinal:  Yes (INGUINAL HERNIA)


Musculoskeletal:  No


Endocrine:  No


HEENT:  No


Cancer:  No


Psychosocial:  Yes ("BIPOLAR TYPE 2, SCHIZOID NO DILUSIONS")


Sleep Difficulties, Anxiety, Bipolar, Depression


Integumentary:  No


Blood Disorders:  No





Physical Exam





Vital Signs - First Documented








 4/28/21





 20:15


 


Temp 36.5


 


Pulse 93


 


Resp 18


 


B/P (MAP) 142/94 (110)


 


Pulse Ox 98


 


O2 Delivery Room Air





Capillary Refill :


Height, Weight, BMI


Height: 5'10.00"


Weight: 150lbs. oz. 68.335417la; 24.80 BMI


Method:Stated


General Appearance:  mild distress


HEENT:  PERRL/EOMI, normal ENT inspection, TMs normal, pharynx normal


Neck:  non-tender, full range of motion, supple, normal inspection


Respiratory:  chest non-tender, lungs clear, normal breath sounds


Cardiovascular:  normal peripheral pulses, regular rate, rhythm


Gastrointestinal:  normal bowel sounds, non tender, soft


Neurologic/Psychiatric:  no motor/sensory deficits, alert, normal mood/affect, 

oriented x 3


Appearance/Memory:  appropriate appearance, appropriate insight, neat, no memory

impairment


Behavior/Eye Contact:  cooperative, good eye contact, normal speech


Thoughts/Hallucinations:  normal thought pattern, no apparent hallucination


Skin:  normal color, warm/dry





Progress/Results/Core Measures


Results/Orders


Lab Results





Laboratory Tests








Test


 4/28/21


20:25 4/28/21


20:50 4/28/21


21:19 Range/Units


 


 


White Blood Count


 9.4 


 


 


 4.3-11.0


10^3/uL


 


Red Blood Count


 5.03 


 


 


 4.30-5.52


10^6/uL


 


Hemoglobin 15.5    13.3-17.7  g/dL


 


Hematocrit 45    40-54  %


 


Mean Corpuscular Volume 89    80-99  fL


 


Mean Corpuscular Hemoglobin 31    25-34  pg


 


Mean Corpuscular Hemoglobin


Concent 35 


 


 


 32-36  g/dL





 


Red Cell Distribution Width 13.0    10.0-14.5  %


 


Platelet Count


 349 


 


 


 130-400


10^3/uL


 


Mean Platelet Volume 10.9    9.0-12.2  fL


 


Immature Granulocyte % (Auto) 0     %


 


Neutrophils (%) (Auto) 65    42-75  %


 


Lymphocytes (%) (Auto) 21    12-44  %


 


Monocytes (%) (Auto) 12    0-12  %


 


Eosinophils (%) (Auto) 2    0-10  %


 


Basophils (%) (Auto) 1    0-10  %


 


Neutrophils # (Auto)


 6.1 


 


 


 1.8-7.8


10^3/uL


 


Lymphocytes # (Auto)


 2.0 


 


 


 1.0-4.0


10^3/uL


 


Monocytes # (Auto)


 1.1 H


 


 


 0.0-1.0


10^3/uL


 


Eosinophils # (Auto)


 0.2 


 


 


 0.0-0.3


10^3/uL


 


Basophils # (Auto)


 0.1 


 


 


 0.0-0.1


10^3/uL


 


Immature Granulocyte # (Auto)


 0.0 


 


 


 0.0-0.1


10^3/uL


 


Sodium Level 138    135-145  MMOL/L


 


Potassium Level 4.2    3.6-5.0  MMOL/L


 


Chloride Level 104      MMOL/L


 


Carbon Dioxide Level 21    21-32  MMOL/L


 


Anion Gap 13    5-14  MMOL/L


 


Blood Urea Nitrogen 16    7-18  MG/DL


 


Creatinine


 1.28 


 


 


 0.60-1.30


MG/DL


 


Estimat Glomerular Filtration


Rate 56 


 


 


  





 


BUN/Creatinine Ratio 13     


 


Glucose Level 120 H     MG/DL


 


Calcium Level 9.8    8.5-10.1  MG/DL


 


Corrected Calcium     8.5-10.1  MG/DL


 


Total Bilirubin 0.4    0.1-1.0  MG/DL


 


Aspartate Amino Transf


(AST/SGOT) 32 


 


 


 5-34  U/L





 


Alanine Aminotransferase


(ALT/SGPT) 20 


 


 


 0-55  U/L





 


Alkaline Phosphatase 88      U/L


 


Total Protein 7.8    6.4-8.2  GM/DL


 


Albumin 4.8 H   3.2-4.5  GM/DL


 


TSH Cascade Testing


 2.92 


 


 


 0.35-4.94


UIU/ML


 


Salicylates Level < 5.0 L   5.0-20.0  MG/DL


 


Acetaminophen Level < 10 L   10-30  UG/ML


 


Serum Alcohol < 10    <10  MG/DL


 


Urine Color  DARK YELLOW    


 


Urine Clarity  CLEAR    


 


Urine pH  5.5   5-9  


 


Urine Specific Gravity  >=1.030   1.016-1.022  


 


Urine Protein  1+ H  NEGATIVE  


 


Urine Glucose (UA)  NEGATIVE   NEGATIVE  


 


Urine Ketones  NEGATIVE   NEGATIVE  


 


Urine Nitrite  NEGATIVE   NEGATIVE  


 


Urine Bilirubin  1+ H  NEGATIVE  


 


Urine Urobilinogen  1.0   < = 1.0  MG/DL


 


Urine Leukocyte Esterase  NEGATIVE   NEGATIVE  


 


Urine RBC (Auto)  NEGATIVE   NEGATIVE  


 


Urine RBC  NONE    /HPF


 


Urine WBC  2-5    /HPF


 


Urine Crystals  PRESENT H   /LPF


 


Urine Amorphous Sediment


 


 FEW RANI


URATES H 


  /LPF





 


Urine Bacteria  TRACE    /HPF


 


Urine Casts  PRESENT    /LPF


 


Urine Hyaline Casts  25-50 H   /LPF


 


Urine Mucus  NEGATIVE    /LPF


 


Urine Culture Indicated  NO    


 


Urine Opiates Screen  NEGATIVE   NEGATIVE  


 


Urine Oxycodone Screen  NEGATIVE   NEGATIVE  


 


Urine Methadone Screen  NEGATIVE   NEGATIVE  


 


Urine Propoxyphene Screen  NEGATIVE   NEGATIVE  


 


Urine Barbiturates Screen  NEGATIVE   NEGATIVE  


 


Ur Tricyclic Antidepressants


Screen 


 NEGATIVE 


 


 NEGATIVE  





 


Urine Phencyclidine Screen  NEGATIVE   NEGATIVE  


 


Urine Amphetamines Screen  NEGATIVE   NEGATIVE  


 


Urine Methamphetamines Screen  NEGATIVE   NEGATIVE  


 


Urine Benzodiazepines Screen  NEGATIVE   NEGATIVE  


 


Urine Cocaine Screen  NEGATIVE   NEGATIVE  


 


Urine Cannabinoids Screen  NEGATIVE   NEGATIVE  


 


Coronavirus 2019 (APOLINAR)   Negative  Not Detecte  








My Orders





Orders - COLT,EDIN ARNP


Ua Culture If Indicated (4/28/21 20:18)


Cbc With Automated Diff (4/28/21 20:18)


Comprehensive Metabolic Panel (4/28/21 20:18)


Alcohol (4/28/21 20:18)


Drug Screen Stat (Urine) (4/28/21 20:18)


Acetaminophen (4/28/21 20:18)


Salicylate (4/28/21 20:18)


Ekg Tracing (4/28/21 20:18)


Ed Iv/Invasive Line Start (4/28/21 20:18)


Thyroid Analyzer (4/28/21 20:18)


Monitor-Rhythm Ecg Trace Only (4/28/21 20:18)


Bh Status Checks/Observation Q15M (4/28/21 20:18)


Ed Iv/Invasive Line Start (4/28/21 20:18)


Lithium Level (4/28/21 20:45)


Covid 19 Inhouse Test (4/28/21 21:30)


Zolpidem Tablet (Ambien Tablet) (4/28/21 22:15)


Hydroxyzine Cap/Tab (Vistaril) (4/28/21 22:15)


Hydroxyzine Cap/Tab (Vistaril) (4/28/21 22:04)





Medications Given in ED





Current Medications








 Medications  Dose


 Ordered  Sig/Veronique


 Route  Start Time


 Stop Time Status Last Admin


Dose Admin


 


 Hydroxyzine


 Pamoate  25 mg  ONCE  ONCE


 PO  4/28/21 22:15


 4/28/21 22:16 DC 4/28/21 22:20


25 MG


 


 Zolpidem Tartrate  5 mg  ONCE  ONCE


 PO  4/28/21 22:15


 4/28/21 22:16 DC 4/28/21 22:20


5 MG








Vital Signs/I&O











 4/28/21





 20:15


 


Temp 36.5


 


Pulse 93


 


Resp 18


 


B/P (MAP) 142/94 (110)


 


Pulse Ox 98


 


O2 Delivery Room Air











Progress


Progress Note :  


   Time:  20:30


Progress Note


pt seen and evaluated. Will obtain labs, EKG and attempt to make transfer plans.




2045 Called for  Screen [id number], they will have screener evaluate patient via

tele-visit, when available. 


2050 [facility] no beds; [facility] no beds at [facility] or [facility]. [facility] 

[facility], no beds but willing to put him on their wait list after Mental 

Health Screen and [facility] faxed. 


2130 patient has remained calm and cooperative, no requests. 


2145 [place] mental health screener Hernandez, spoke to this provider. Then joined Zoom 

to tele-screen patient. 


2200 Spoke to Hernandez, she screened patient and did not report being suicidal, he

is afraid at home that he will start using drugs again and then Overdose. She is

trying to reach out to family members to see about a safety plan and follow up 

with [facility]. He is also willing to do voluntary inpatient rehab for

drug addictions. She will try to make a plan and call this facility back. report

given to Dr. Merrill. 


2210 Hernandez returned call, she has placement for 0800 4/29/21 at [facility] in [place]. She will fax her screening report and the plan for 

placement. Spoke to patient, he is agreeable. Spoke to Dr. Ware, agreeable with

admission. Social Work consult for morning, to arrange transfer. Will need 

sitter in room, as he has been in and out of bed, coming to door, but no 

attempts to elope.


Initial ECG Impression Date:  Apr 28, 2021


Initial ECG Impression Time:  20:29


Initial ECG Rate:  103


Initial ECG Rhythm:  S.Tach


Initial ECG Intervals:  Normal


Initial ECG Intervals


, QRSD 86, , QTc 445.


Axis P 63, QRS 56, T 47.


Initial ECG Impression:  Normal


Initial ECG Comparisson:  Unchanged


Transfer of Care Time:  22:00


Care transferred to:  Dr. Merrill, report given.





Departure


Impression





   Primary Impression:  


   Bipolar 1 disorder, depressed, moderate


   Additional Impressions:  


   Suicidal ideations


   Polysubstance abuse


Disposition:  09 ADMITTED AS INPATIENT


Condition:  Stable





Admissions


Decision to Admit Reason:  Admit from ER (General)


Decision to Admit/Date:  Apr 28, 2021


Time/Decision to Admit Time:  22:00





Departure-Patient Inst.


Referrals:  


[facility] (PCP/Family)


Primary Care Physician


Patient Instructions:  OUTPT MENTAL HEALTH SERVICES











EDIN GREGORY                  Apr 28, 2021 20:42

## 2021-04-29 VITALS — SYSTOLIC BLOOD PRESSURE: 97 MMHG | DIASTOLIC BLOOD PRESSURE: 57 MMHG

## 2021-04-29 VITALS — DIASTOLIC BLOOD PRESSURE: 66 MMHG | SYSTOLIC BLOOD PRESSURE: 105 MMHG

## 2021-04-29 VITALS — DIASTOLIC BLOOD PRESSURE: 54 MMHG | SYSTOLIC BLOOD PRESSURE: 90 MMHG

## 2021-04-29 VITALS — DIASTOLIC BLOOD PRESSURE: 61 MMHG | SYSTOLIC BLOOD PRESSURE: 100 MMHG

## 2021-04-29 VITALS — SYSTOLIC BLOOD PRESSURE: 111 MMHG | DIASTOLIC BLOOD PRESSURE: 77 MMHG

## 2021-04-29 VITALS — SYSTOLIC BLOOD PRESSURE: 91 MMHG | DIASTOLIC BLOOD PRESSURE: 57 MMHG

## 2021-04-29 RX ADMIN — LORAZEPAM PRN MG: 1 TABLET ORAL at 15:08

## 2021-04-29 RX ADMIN — LITHIUM CARBONATE SCH MG: 300 CAPSULE, GELATIN COATED ORAL at 20:33

## 2021-04-29 RX ADMIN — LORAZEPAM PRN MG: 1 TABLET ORAL at 08:39

## 2021-04-29 RX ADMIN — LORAZEPAM PRN MG: 1 TABLET ORAL at 03:36

## 2021-04-29 RX ADMIN — LITHIUM CARBONATE SCH MG: 300 CAPSULE, GELATIN COATED ORAL at 04:39

## 2021-04-29 RX ADMIN — LORAZEPAM PRN MG: 1 TABLET ORAL at 20:53

## 2021-04-29 RX ADMIN — LITHIUM CARBONATE SCH MG: 300 CAPSULE, GELATIN COATED ORAL at 08:33

## 2021-04-29 NOTE — HISTORY & PHYSICAL
BORIS NOVOA,MED STUDENT 4/29/21 1119:


HPI


History of Present Illness:


Upon interview patient is moderately conversational, but slightly somnolent. He 

reports that since he left [place] a few days ago he has had suicidal ideations 

of overdosing on heroin, and reports he didn't have immediate access but could 

obtain it. Still reporting SI today with same plan and intent to act. Reports he

feels safe while in hospital currently. Denies any illegal drug use since his 

reported attempt to overdose on heroin/meth a few weeks ago, which resulted in 

being inpatient at [facility]. Unable to state any precipitating factors that led 

to his mood feeling down. Reports he has been sleeping well, denies racing 

thoughts. Denies any auditory/visual hallucinations/paranoia. Physically he 

denies any pain, reports he feels a little more tired than usual but no specific

concerns.


Source:  patient


Exam Limitations:  no limitations


Attending Physician


Samantha Ware MD


[facility]


Consult





Date of Admission


Apr 28, 2021 at 22:30





Home Medications


Home Medications


Reviewed patient Home Medication Reconciliation performed by pharmacy medication

reconciliations technician and/or nursing.


Patients Allergies have been reviewed.





Allergies


Coded Allergies:  


     No Known Drug Allergies (Unverified , 8/25/19)





PMH-Social-Family Hx


Patient Social History


Drug of Choice:  METHAMPHETAMINE, HEROIN


Smoking Status:  Former Smoker (reports last use 2 weeks ago)


2nd Hand Smoke Exposure:  No


Recent Hopitalizations:  No


Alcohol Use?:  No (reports last use 2 weeks ago)


Substance type:  Methamphetamine


Have you traveled recently?:  No





Immunizations Up To Date


Tetanus Booster (TDap):  Unknown


Date of Influenza Vaccine:  Oct 1, 2021





Past Medical History





MDD


Bipolar


Hx Drug Use (Heroin/Methamphetamine)


Anxiety


Inguinal Hernia


Prostatectomy/BPH





Family Medical History


Significant Family History:  No Pertinent Family Hx





Review of Systems ([facility])


Constitutional:  No chills, No diaphoresis, No fever, No weakness; other (mild 

fatigue)


EENTM:  No hearing loss, No vision loss, No nose congestion


Respiratory:  No cough, No short of breath


Cardiovascular:  No chest pain, No edema


Gastrointestinal:  No abdominal pain


Genitourinary:  No dysuria, No frequency


Musculoskeletal:  No muscle pain


Skin:  no symptoms reported


Psychiatric/Neurological:  See HPI





Reviewed Test Results


Reviewed Test Results


Lab





Laboratory Tests








Test


 4/28/21


20:25 4/28/21


20:50 4/28/21


21:19 Range/Units


 


 


White Blood Count


 9.4 


 


 


 4.3-11.0


10^3/uL


 


Red Blood Count


 5.03 


 


 


 4.30-5.52


10^6/uL


 


Hemoglobin 15.5    13.3-17.7  g/dL


 


Hematocrit 45    40-54  %


 


Mean Corpuscular Volume 89    80-99  fL


 


Mean Corpuscular Hemoglobin 31    25-34  pg


 


Mean Corpuscular Hemoglobin


Concent 35 


 


 


 32-36  g/dL





 


Red Cell Distribution Width 13.0    10.0-14.5  %


 


Platelet Count


 349 


 


 


 130-400


10^3/uL


 


Mean Platelet Volume 10.9    9.0-12.2  fL


 


Immature Granulocyte % (Auto) 0     %


 


Neutrophils (%) (Auto) 65    42-75  %


 


Lymphocytes (%) (Auto) 21    12-44  %


 


Monocytes (%) (Auto) 12    0-12  %


 


Eosinophils (%) (Auto) 2    0-10  %


 


Basophils (%) (Auto) 1    0-10  %


 


Neutrophils # (Auto)


 6.1 


 


 


 1.8-7.8


10^3/uL


 


Lymphocytes # (Auto)


 2.0 


 


 


 1.0-4.0


10^3/uL


 


Monocytes # (Auto)


 1.1 H


 


 


 0.0-1.0


10^3/uL


 


Eosinophils # (Auto)


 0.2 


 


 


 0.0-0.3


10^3/uL


 


Basophils # (Auto)


 0.1 


 


 


 0.0-0.1


10^3/uL


 


Immature Granulocyte # (Auto)


 0.0 


 


 


 0.0-0.1


10^3/uL


 


Sodium Level 138    135-145  MMOL/L


 


Potassium Level 4.2    3.6-5.0  MMOL/L


 


Chloride Level 104      MMOL/L


 


Carbon Dioxide Level 21    21-32  MMOL/L


 


Anion Gap 13    5-14  MMOL/L


 


Blood Urea Nitrogen 16    7-18  MG/DL


 


Creatinine


 1.28 


 


 


 0.60-1.30


MG/DL


 


Estimat Glomerular Filtration


Rate 56 


 


 


  





 


BUN/Creatinine Ratio 13     


 


Glucose Level 120 H     MG/DL


 


Calcium Level 9.8    8.5-10.1  MG/DL


 


Corrected Calcium     8.5-10.1  MG/DL


 


Total Bilirubin 0.4    0.1-1.0  MG/DL


 


Aspartate Amino Transf


(AST/SGOT) 32 


 


 


 5-34  U/L





 


Alanine Aminotransferase


(ALT/SGPT) 20 


 


 


 0-55  U/L





 


Alkaline Phosphatase 88      U/L


 


Total Protein 7.8    6.4-8.2  GM/DL


 


Albumin 4.8 H   3.2-4.5  GM/DL


 


TSH Cascade Testing


 2.92 


 


 


 0.35-4.94


UIU/ML


 


Salicylates Level < 5.0 L   5.0-20.0  MG/DL


 


Acetaminophen Level < 10 L   10-30  UG/ML


 


Serum Alcohol < 10    <10  MG/DL


 


Urine Color  DARK YELLOW    


 


Urine Clarity  CLEAR    


 


Urine pH  5.5   5-9  


 


Urine Specific Gravity  >=1.030   1.016-1.022  


 


Urine Protein  1+ H  NEGATIVE  


 


Urine Glucose (UA)  NEGATIVE   NEGATIVE  


 


Urine Ketones  NEGATIVE   NEGATIVE  


 


Urine Nitrite  NEGATIVE   NEGATIVE  


 


Urine Bilirubin  1+ H  NEGATIVE  


 


Urine Urobilinogen  1.0   < = 1.0  MG/DL


 


Urine Leukocyte Esterase  NEGATIVE   NEGATIVE  


 


Urine RBC (Auto)  NEGATIVE   NEGATIVE  


 


Urine RBC  NONE    /HPF


 


Urine WBC  2-5    /HPF


 


Urine Crystals  PRESENT H   /LPF


 


Urine Amorphous Sediment


 


 FEW RANI


URATES H 


  /LPF





 


Urine Bacteria  TRACE    /HPF


 


Urine Casts  PRESENT    /LPF


 


Urine Hyaline Casts  25-50 H   /LPF


 


Urine Mucus  NEGATIVE    /LPF


 


Urine Culture Indicated  NO    


 


Urine Opiates Screen  NEGATIVE   NEGATIVE  


 


Urine Oxycodone Screen  NEGATIVE   NEGATIVE  


 


Urine Methadone Screen  NEGATIVE   NEGATIVE  


 


Urine Propoxyphene Screen  NEGATIVE   NEGATIVE  


 


Urine Barbiturates Screen  NEGATIVE   NEGATIVE  


 


Ur Tricyclic Antidepressants


Screen 


 NEGATIVE 


 


 NEGATIVE  





 


Urine Phencyclidine Screen  NEGATIVE   NEGATIVE  


 


Urine Amphetamines Screen  NEGATIVE   NEGATIVE  


 


Urine Methamphetamines Screen  NEGATIVE   NEGATIVE  


 


Urine Benzodiazepines Screen  NEGATIVE   NEGATIVE  


 


Urine Cocaine Screen  NEGATIVE   NEGATIVE  


 


Urine Cannabinoids Screen  NEGATIVE   NEGATIVE  


 


Coronavirus 2019 (APOLINAR)   Negative  Not Detecte  











Physical Exam-([facility])


Physical Exam


Vital Signs





                          VS - Last 72 Hours, by Label








 4/28/21 4/28/21 4/28/21 4/28/21





 20:15 23:10 23:15 23:15


 


Temp 36.5 36.6  36.3


 


Pulse 93 82  98


 


Resp 18 17  18


 


B/P (MAP) 142/94 (110) 108/82 (110)  109/77 (88)


 


Pulse Ox 98 96 95 95


 


O2 Delivery Room Air Room Air Room Air 


 


    





 4/29/21 4/29/21 4/29/21 





 04:00 07:47 08:00 


 


Temp 36.6 36.5  


 


Pulse 88 93  


 


Resp 18 15  


 


B/P (MAP) 105/66 (79) 100/61 (74)  


 


Pulse Ox 96 98  


 


O2 Delivery  Room Air Room Air 





Capillary Refill : Less Than 3 Seconds


General Appearance:  no apparent distress


Eyes:  Bilateral Eye Normal Inspection


HEENT:  No scleral icterus (R), No scleral icterus (L)


Neck:  non-tender, full range of motion, normal inspection


Respiratory:  lungs clear, normal breath sounds, no respiratory distress


Cardiovascular:  regular rate, rhythm, no edema, no JVD


Gastrointestinal:  normal bowel sounds, non tender, soft


Back:  normal inspection


Extremities:  normal range of motion


Neurologic/Psychiatric:  depressed affect


Skin:  normal color





Assessment/Plan


Assessment/Plan





(1) Suicidal ideations


Status:  Acute


Assessment & Plan:  Patient with plans to overdose, looking for inpatient 

placement. Currently on CO as still reporting SI while in the hospital. 





(2) Bipolar 1 disorder, depressed, moderate


Status:  Chronic


Assessment & Plan:  Patient with normal sleep, regular speech rate/rhythm, 

denies racing thoughts. No current signs of beka, continue PTA Lithium. 





(3) Polysubstance abuse


Status:  Chronic


Assessment & Plan:  Patient reports plan to overdose on heroin, denies any use 

since psych admission 2 weeks ago. 





(4) Depression


Status:  Acute


Assessment & Plan:  Continue PTA Celexa.


Qualifiers:  


   





SAMANTHA WARE MD 4/29/21 1429:


HPI


History of Present Illness:


Time Seen by Provider:  09:10





Home Medications


Allergies


Coded Allergies:  


     No Known Drug Allergies (Unverified , 8/25/19)





Assessment/Plan


Assessment/Plan


Admission Status:  Observation





Supervisory-Addendum Brief


Verification & Attestation


Participated in pt care:  history, MDM, physical


Personally performed:  exam, history, MDM


Care discussed with:  Medical Student


Procedures:  n/a


I personally saw and examined patient and did my own history and exam which 

agree with that documented by the medical student. I directed the plan of care 

as documented.











BORIS NOVOA,MED STUDENT     Apr 29, 2021 11:19


SAMANTHA WARE MD             Apr 29, 2021 14:29

## 2021-04-30 VITALS — SYSTOLIC BLOOD PRESSURE: 124 MMHG | DIASTOLIC BLOOD PRESSURE: 77 MMHG

## 2021-04-30 VITALS — DIASTOLIC BLOOD PRESSURE: 57 MMHG | SYSTOLIC BLOOD PRESSURE: 93 MMHG

## 2021-04-30 RX ADMIN — LITHIUM CARBONATE SCH MG: 300 CAPSULE, GELATIN COATED ORAL at 08:21

## 2021-04-30 RX ADMIN — LORAZEPAM PRN MG: 1 TABLET ORAL at 03:35

## 2021-04-30 NOTE — DISCHARGE SUMMARY
Discharge Inst-[facility]


Discharge Medications


New, Converted or Re-Newed RX:  Transmitted to Pharmacy


New Medications:  


Citalopram Hydrobromide (Citalopram HBr) 20 Mg Tablet


20 MG PO DAILY, #30 TAB 0 Refills





 


Continued Medications:  


Lithium Carbonate (Lithium Carbonate) 300 Mg Capsule


300 MG PO BID, CAP











Patient Instructions


Patient Instructions


Prescriptions will be delivered by [facility] pharmacy to [facility] today.


Goal/Follow Up Appt:  


Follow up after discharge from [facility].


[facility]:  


Fever, shortness of breath, chest pain





Activity & Diet


Discharge Diet:  No Restrictions


Activity as Tolerated:  Yes











SAMANTHA BURGESS MD             Apr 30, 2021 08:25

## 2021-04-30 NOTE — DISCHARGE SUMMARY
Discharge Summary


Hospital Course


Problems/Diagnosis:  


(1) Suicidal ideations


Status:  Acute


Assessment & Plan:  Patient with plans to overdose, looking for inpatient p

lacement. Currently on CO as still reporting SI while in the hospital. 


[facility]





(2) Bipolar 1 disorder, depressed, moderate


Status:  Chronic


Assessment & Plan:  Patient with normal sleep, regular speech rate/rhythm, 

denies racing thoughts. No current signs of beka, continue PTA Lithium. 


Lithium and citalopram to be delivered to [facility] for patient on discharge.





(3) Polysubstance abuse


Status:  Chronic


Assessment & Plan:  Patient reports plan to overdose on heroin, denies any use 

since psych admission 2 weeks ago. 





(4) Depression


Status:  Acute


Assessment & Plan:  Continue PTA Celexa.


Qualifiers:  


   


Hospital Course


Date of Admission: Apr 28, 2021 at 22:30 


Admission Diagnosis :  





Family Physician/Provider: [place]  





Date of Discharge: 4/30/21 


Discharge Diagnosis: See problem list








Hospital Course:


See problem list














Labs and Pending Lab Test:





Home Meds


Active


Citalopram HBr (Citalopram Hydrobromide) 20 Mg Tablet 20 Mg PO DAILY


Reported


Lithium Carbonate 300 Mg Capsule 300 Mg PO BID


Assessment/Pt DC Instructions


Follow up at [facility].


Discharge Diet:  No Restrictions





Discharge Physical Examination


Allergies:  


Coded Allergies:  


     No Known Drug Allergies (Unverified , 8/25/19)


General Appearance:  No Apparent Distress, WD/WN


Respiratory:  Lungs Clear, Normal Breath Sounds


Cardiovascular:  Regular Rate, Rhythm, No Murmur


Extremity:  No Pedal Edema


Skin:  Normal Color, Warm/Dry


Neurologic/Psychiatric:  Alert, Depressed Affect











SAMANTHA BURGESS MD             Apr 30, 2021 13:23

## 2021-05-14 ENCOUNTER — HOSPITAL ENCOUNTER (EMERGENCY)
Dept: HOSPITAL 75 - ER | Age: 66
Discharge: PSYCHIATRIC OR SUBSTANCE USE FACILITY | End: 2021-05-14
Payer: MEDICARE

## 2021-05-14 VITALS — HEIGHT: 70 IN | BODY MASS INDEX: 21.87 KG/M2 | WEIGHT: 152.78 LBS

## 2021-05-14 VITALS — DIASTOLIC BLOOD PRESSURE: 80 MMHG | SYSTOLIC BLOOD PRESSURE: 118 MMHG

## 2021-05-14 DIAGNOSIS — F17.210: ICD-10-CM

## 2021-05-14 DIAGNOSIS — R45.851: Primary | ICD-10-CM

## 2021-05-14 DIAGNOSIS — F32.9: ICD-10-CM

## 2021-05-14 DIAGNOSIS — F41.9: ICD-10-CM

## 2021-05-14 DIAGNOSIS — Z79.899: ICD-10-CM

## 2021-05-14 DIAGNOSIS — Z20.822: ICD-10-CM

## 2021-05-14 LAB
ALBUMIN SERPL-MCNC: 4 GM/DL (ref 3.2–4.5)
ALP SERPL-CCNC: 86 U/L (ref 40–136)
ALT SERPL-CCNC: 11 U/L (ref 0–55)
APAP SERPL-MCNC: < 10 UG/ML (ref 10–30)
APTT PPP: YELLOW S
BACTERIA #/AREA URNS HPF: (no result) /HPF
BARBITURATES UR QL: NEGATIVE
BASOPHILS # BLD AUTO: 0.1 10^3/UL (ref 0–0.1)
BASOPHILS NFR BLD AUTO: 1 % (ref 0–10)
BENZODIAZ UR QL SCN: NEGATIVE
BILIRUB SERPL-MCNC: 0.3 MG/DL (ref 0.1–1)
BILIRUB UR QL STRIP: NEGATIVE
BUN/CREAT SERPL: 13
CALCIUM SERPL-MCNC: 9.1 MG/DL (ref 8.5–10.1)
CHLORIDE SERPL-SCNC: 106 MMOL/L (ref 98–107)
CO2 SERPL-SCNC: 24 MMOL/L (ref 21–32)
COCAINE UR QL: NEGATIVE
CREAT SERPL-MCNC: 0.95 MG/DL (ref 0.6–1.3)
EOSINOPHIL # BLD AUTO: 0.3 10^3/UL (ref 0–0.3)
EOSINOPHIL NFR BLD AUTO: 3 % (ref 0–10)
FIBRINOGEN PPP-MCNC: CLEAR MG/DL
GFR SERPLBLD BASED ON 1.73 SQ M-ARVRAT: > 60 ML/MIN
GLUCOSE SERPL-MCNC: 113 MG/DL (ref 70–105)
GLUCOSE UR STRIP-MCNC: NEGATIVE MG/DL
HCT VFR BLD CALC: 38 % (ref 40–54)
HGB BLD-MCNC: 13.5 G/DL (ref 13.3–17.7)
KETONES UR QL STRIP: NEGATIVE
LEUKOCYTE ESTERASE UR QL STRIP: NEGATIVE
LYMPHOCYTES # BLD AUTO: 1.7 10^3/UL (ref 1–4)
LYMPHOCYTES NFR BLD AUTO: 17 % (ref 12–44)
MANUAL DIFFERENTIAL PERFORMED BLD QL: NO
MCH RBC QN AUTO: 32 PG (ref 25–34)
MCHC RBC AUTO-ENTMCNC: 35 G/DL (ref 32–36)
MCV RBC AUTO: 89 FL (ref 80–99)
METHADONE UR QL SCN: NEGATIVE
METHAMPHETAMINE SCREEN URINE S: NEGATIVE
MONOCYTES # BLD AUTO: 0.8 10^3/UL (ref 0–1)
MONOCYTES NFR BLD AUTO: 8 % (ref 0–12)
NEUTROPHILS # BLD AUTO: 7.2 10^3/UL (ref 1.8–7.8)
NEUTROPHILS NFR BLD AUTO: 72 % (ref 42–75)
NITRITE UR QL STRIP: NEGATIVE
OPIATES UR QL SCN: NEGATIVE
OTHER ELEMENTS URNS MICRO: (no result) /HPF
OXYCODONE UR QL: NEGATIVE
PH UR STRIP: 6.5 [PH] (ref 5–9)
PLATELET # BLD: 306 10^3/UL (ref 130–400)
PMV BLD AUTO: 9.9 FL (ref 9–12.2)
POTASSIUM SERPL-SCNC: 3.9 MMOL/L (ref 3.6–5)
PROPOXYPH UR QL: NEGATIVE
PROT SERPL-MCNC: 6.5 GM/DL (ref 6.4–8.2)
PROT UR QL STRIP: NEGATIVE
RBC #/AREA URNS HPF: (no result) /HPF
SALICYLATES SERPL-MCNC: < 5 MG/DL (ref 5–20)
SODIUM SERPL-SCNC: 139 MMOL/L (ref 135–145)
SP GR UR STRIP: 1.02 (ref 1.02–1.02)
SQUAMOUS #/AREA URNS HPF: (no result) /HPF
TRICYCLICS UR QL SCN: NEGATIVE
TSH SERPL DL<=0.05 MIU/L-ACNC: 1.34 UIU/ML (ref 0.35–4.94)
WBC # BLD AUTO: 10 10^3/UL (ref 4.3–11)
WBC #/AREA URNS HPF: (no result) /HPF

## 2021-05-14 PROCEDURE — 80329 ANALGESICS NON-OPIOID 1 OR 2: CPT

## 2021-05-14 PROCEDURE — 85025 COMPLETE CBC W/AUTO DIFF WBC: CPT

## 2021-05-14 PROCEDURE — 84443 ASSAY THYROID STIM HORMONE: CPT

## 2021-05-14 PROCEDURE — 81000 URINALYSIS NONAUTO W/SCOPE: CPT

## 2021-05-14 PROCEDURE — 80053 COMPREHEN METABOLIC PANEL: CPT

## 2021-05-14 PROCEDURE — 36415 COLL VENOUS BLD VENIPUNCTURE: CPT

## 2021-05-14 PROCEDURE — 99283 EMERGENCY DEPT VISIT LOW MDM: CPT

## 2021-05-14 PROCEDURE — 80320 DRUG SCREEN QUANTALCOHOLS: CPT

## 2021-05-14 PROCEDURE — 80178 ASSAY OF LITHIUM: CPT

## 2021-05-14 PROCEDURE — 87636 SARSCOV2 & INF A&B AMP PRB: CPT

## 2021-05-14 PROCEDURE — 80306 DRUG TEST PRSMV INSTRMNT: CPT

## 2021-05-14 PROCEDURE — 93005 ELECTROCARDIOGRAM TRACING: CPT

## 2021-05-14 NOTE — ED PSYCHOSOCIAL
General


Chief Complaint:  Suicidal Ideation Risk


Source:  patient (PT GIVES CONFLICTING INFORMATION), old records





History of Present Illness


Date Seen by Provider:  May 14, 2021


Time Seen by Provider:  19:35


Initial Comments


PT ARRIVES VIA EMS--WALKS INTO ER ON HIS OWN FROM THE AMBULANCE. 


PT STATES HE IS "HAVING SUICIDAL IDEATIONS AND HAS PTSD"


PT STATES HE HAS BEEN FEELING SUICIDAL FOR THE LAST COUPLE OF DAYS


STATES HE HAS THOUGHT ABOUT WALKING OUT INTO TRAFFIC OR OVERDOSING ON FENTANYL. 

STATES HE DOES NOT HAVE FENTANYL IN HIS POSSESSION, "BUT I HAVE ACCESS TO IT" 


PT DENIES ANY SPECIFIC TRIGGER, BUT STATES "I'M TIRED OF LIVING" AND STATES "MY 

FAMILY HAS REJECTED ME BECAUSE OF DRUG USE" 


STATES THAT IF WE LET HIM GO HOME, HE WILL KILL HIMSELF





PT STATES HE TAKES LITHIUM FOR BIPOLAR DISORDER--STATES HE TOOK ONE 2 HOURS AGO


PT HAS ALSO BEEN PRESCRIBED CITALOPRAM AT LAST ADMIT HERE--PT HAS NOT BEEN 

TAKING IT





PT WITH LONG HISTORY OF DRUG USE, INCLUDING METHAMPETAMINES, BLACK TAR HEROIN 

AND THC. 


DENIES IV DRUG USE. STATES HE SMOKES ALL OF THOSE DRUGS


STATES HE HAS NOT USED ANY DRUGS FOR 2 WEEKS


PT STATES HE HAS NOT USED ANY ALCOHOL FOR 3 MONTHS, STATES HE NORMALLY DRINKS 2-

3 MIXED DRINKS A DAY. 








PT HAS HISTORY OF MENTAL HEALTH PROBLEMS, AND HAS HISTORY OF SUICIDAL IDEATIONS,

BUT STATES HE HAS NEVER ACTUALLY ATTEMPTED IT


PT STATES HE WAS  ADMITTED TO [facility] 10 DAYS AGO


STATES HE WAS THERE FOR A COUPLE OF DAYS, AND STATES "I DIDN'T FEEL LIKE THEY 

MET MY NEEDS. I WAS ASKING FOR SIMPLE MEDICATION CHANGES" 





ON REVIEW OF OLD CHART, PT WAS SEEN HERE IN ER 04/28/21 FOR SUICIDAL IDEATIONS, 

AND HAD REPORTED AT THAT TIME THAT HE HAD BEEN DISCHARGED FROM [facility] 4 DAYS 

PRIOR. 


HE ADMITTED AT THAT TIME THAT HE HAD TRIED TO OVERDOSE ON HEROIN AND 

METHAMPHETAMINE 2 WEEKS PRIOR. 


PT WAS ADMITTED HERE FROM 04/28-04/30/21 AND THEN WAS DISCHARGED TO [facility]. 





NO FEVER OR RECENT ILLNESS


STATES HE HAS RECEIVED BOTH COVID-19 VACCINATIONS--LAST ONE WAS WHEN HE WAS AT 

[facility], PER PT





PCP: [facility]


PSYCH: [facility]





Allergies and Home Medications


Allergies


Coded Allergies:  


     No Known Drug Allergies (Unverified , 8/25/19)





Home Medications


Citalopram Hydrobromide 20 Mg Tablet, 20 MG PO DAILY


   Prescribed by: SAMANTHA BURGESS on 4/29/21 2024


Lithium Carbonate 300 Mg Capsule, 300 MG PO BID, (Reported)





Patient Home Medication List


Home Medication List Reviewed:  Yes





Review of Systems


Constitutional:  no symptoms reported


EENTM:  no symptoms reported


Respiratory:  no symptoms reported


Cardiovascular:  no symptoms reported


Gastrointestinal:  no symptoms reported


Genitourinary:  no symptoms reported


Musculoskeletal:  no symptoms reported


Skin:  no symptoms reported


Psychiatric/Neurological:  See HPI





Past Medical-Social-Family Hx


Past Med/Social Hx:  Reviewed and Corrections made


Patient Social History


Alcohol Use:  Regular Use


Drug of Choice:  METHAMPHETAMINE, HEROIN, THC--DENIES IV USE


Smoking Status:  Current Everyday Smoker


Type Used:  Cigarettes


2nd Hand Smoke Exposure:  No


Recent Hopitalizations:  No





Immunizations Up To Date


Tetanus Booster (TDap):  Unknown


Date of Influenza Vaccine:  Oct 1, 2021





Seasonal Allergies


Seasonal Allergies:  Yes





Past Medical History


Surgeries:  Yes (HERNIA REPAIR)


Abdominal, Transurethral Resection


Respiratory:  No


Cardiac:  No


Neurological:  No


Genitourinary:  Yes (S/P TURP)


Benign Prostatic Hyperpl, Prostate Problems


Gastrointestinal:  Yes (LEFT INGUINAL HERNIA)


Abdominal Hernia


Musculoskeletal:  No


Endocrine:  No


HEENT:  No


Cancer:  No


Psychosocial:  Yes ("BIPOLAR TYPE 2-SCHIZOID NO DILUSIONS";SUICIDAL 

IDEATIONS;POLYSUBSTANCE ABU)


Sleep Difficulties, Anxiety, Suicide Attempts, Bipolar, Schizophrenia, 

Depression


Integumentary:  No


Blood Disorders:  No





Family Medical History


No Pertinent Family Hx





SOCIAL HISTORY:


-ETOH--2-3 MIXED DRINKS A DAY. NONE FOR 2-3 MONTHS, PER PT ON 05/14/21


-DRUGS--METHAMPHETAMINES, BLACK TAR HEROIN, THC. DENIES IV USE. CLAIMS NO


USE FOR 2 WEEKS, PER PT ON 05/14/21


-SMOKED 1 PPD, STATES HE QUIT 2-3 MONTHS AGO, PER PT ON 05/14/21








ADDITIONAL HISTORY: 


-MULTIPLE PSYCH ADMITS, INCLUDING [facility]


ALSO [facility], [facility], [facility],


[facility], OTHERS.





LONG HISTORY OF HOMELESSNESS, LIVING IN LOCAL HOTELS AT INTERVALS





Physical Exam





Vital Signs - First Documented








 5/14/21





 19:34


 


Temp 36.5


 


Pulse 106


 


Resp 20


 


B/P (MAP) 123/95 (104)


 


Pulse Ox 98


 


O2 Delivery Room Air





Capillary Refill :


Height, Weight, BMI


Height: 5'10.00"


Weight: 150lbs. oz. 68.596128fx; 20.84 BMI


Method:Stated


General Appearance:  WD/WN, no apparent distress, other (PT IS CALM AND VERY 

COOPERATIVE. )


HEENT:  PERRL/EOMI


Neck:  normal inspection


Respiratory:  normal breath sounds, no respiratory distress, no accessory muscle

use


Cardiovascular:  regular rate, rhythm, no murmur


Gastrointestinal:  normal bowel sounds, non tender, soft, no organomegaly, no 

pulsatile mass


Extremities:  normal range of motion, non-tender, normal inspection, no pedal 

edema, no calf tenderness, normal capillary refill


Neurologic/Psychiatric:  CNs II-XII nml as tested, no motor/sensory deficits, 

alert, normal mood/affect, oriented x 3


Appearance/Memory:  appropriate insight, no memory impairment


Behavior/Eye Contact:  cooperative, good eye contact, normal speech


Thoughts/Hallucinations:  no apparent hallucination; No delusions, No flight of 

ideas, No grandiose, No incoherent, No obsessive, No paranoid, No persecution, 

No phobic, No Restorationist


Skin:  normal color, warm/dry; No rash





Progress/Results/Core Measures


Results/Orders


Lab Results





My Orders





Vital Signs/I&O








Progress


Progress Note :  


Progress Note


PT RESTING QUIETLY DURING ER STAY


PT VOICED NO COMPLAINTS


Initial ECG Impression Date:  May 14, 2021


Initial ECG Impression Time:  20:11


Initial ECG Rate:  90


Initial ECG Rhythm:  Normal Sinus





Departure


Communication (Admissions)


2100--RN CONTACTING [facility]. PT'S INFORMATION TO BE FAXED TO [facility] 

SCREENER


2100--CALLED [facility] --NO BEDS AVAILABLE IN [place] OR [place] 


2013--CALLED [facility], HAVE BEDS. PLACED ON HOLD, PAGING PSYCHIATRIC RESIDENT ON 

CALL. 


2130--STILL ON HOLD. [facility] REPORTS THAT RESIDENT IS CURRENTLY REVIEWING 2 

OTHER PATIENTS, AND REQUESTED THAT WE FAX PT'S INFORMATION TO THEM. [facility] FAX NUMBER NOTED.  THEY WILL CALL US BACK AFTER PT'S INFORMATION IS 

REVIEWED. 


2209--PT DOING TELE-VISIT WITH [facility] SCREENER NOW


2232--CALLED [facility], TO VERIFY IF THEY RECEIVED PT'S INFORMATION. THEY WILL 

CHECK AND CALL US BACK. 


2240--RE-FAXING PT'S INFORMATION TO CORRECT FAX NUMBER--THEY HAD GIVEN US THE 

INCORRECT FAX NUMBER


[id number]--SPOKE WITH DR. BUTLER, ON CALL FOR PSYCH, ACCEPTS PT FOR ADMIT/TRANSFER. HE

REPORTS HE IS VERY FAMILIAR WITH PT AND IN FACT, PT WAS ADMITTED AT THEIR 

FACILITY 4 DAYS AGO. 





2406--CALLED [facility] FOR SECURE TRANSPORT. HE WILL BE HERE IN 30 MINUTES


2325--[facility] HERE FOR TRANSPORT. HE IS VERY  FAMILIAR WITH PT, AS HE HAS 

TRANSPORTED HIM A MULTITUDE OF TIMES TO MULTIPLE FACILITIES IN [facility]. HE 

REPORTS THAT PT IS BASICALLY HOMELESS, LIVES IN LOCAL HOTELS UNTIL HE RUNS OUT 

OF MONEY, THEN COMES TO ER AND STATES HE IS SUICIDAL AND GETS ADMITTED SOMEWHERE

UNTIL HE GETS SOME MORE MONEY.





Impression





   Primary Impression:  


   Passive suicidal ideations


Disposition:  65 XFER TO PSYCH HOSP/UNIT


Condition:  Stable





Transfer


Transfer Reason:  Exceeds level of care


Transfer Facility:  


[facility] 


YAJAIRA KLINE


Method of Transfer:  Private Vehicle ([facility], SECURE TRANSPORT)





Departure-Patient Inst.


Referrals:  


[facility] (PCP/Family)


Primary Care Physician


Patient Instructions:  OUTPT MENTAL HEALTH SERVICES











KIMBERLY MATHIS DO                 May 14, 2021 20:07

## 2021-05-30 ENCOUNTER — HOSPITAL ENCOUNTER (EMERGENCY)
Dept: HOSPITAL 75 - ER | Age: 66
Discharge: HOME | End: 2021-05-30
Payer: MEDICARE

## 2021-05-30 VITALS — HEIGHT: 69.69 IN | WEIGHT: 143.3 LBS | BODY MASS INDEX: 20.75 KG/M2

## 2021-05-30 VITALS — DIASTOLIC BLOOD PRESSURE: 94 MMHG | SYSTOLIC BLOOD PRESSURE: 115 MMHG

## 2021-05-30 DIAGNOSIS — F20.9: ICD-10-CM

## 2021-05-30 DIAGNOSIS — Z87.891: ICD-10-CM

## 2021-05-30 DIAGNOSIS — R45.851: Primary | ICD-10-CM

## 2021-05-30 DIAGNOSIS — F31.9: ICD-10-CM

## 2021-05-30 DIAGNOSIS — Z59.0: ICD-10-CM

## 2021-05-30 LAB
ALBUMIN SERPL-MCNC: 4.3 GM/DL (ref 3.2–4.5)
ALP SERPL-CCNC: 102 U/L (ref 40–136)
ALT SERPL-CCNC: 9 U/L (ref 0–55)
AMORPH SED URNS QL MICRO: (no result) /LPF
APAP SERPL-MCNC: < 10 UG/ML (ref 10–30)
APTT PPP: YELLOW S
BACTERIA #/AREA URNS HPF: NEGATIVE /HPF
BARBITURATES UR QL: NEGATIVE
BASOPHILS # BLD AUTO: 0.1 10^3/UL (ref 0–0.1)
BASOPHILS NFR BLD AUTO: 1 % (ref 0–10)
BENZODIAZ UR QL SCN: NEGATIVE
BILIRUB SERPL-MCNC: 0.3 MG/DL (ref 0.1–1)
BILIRUB UR QL STRIP: NEGATIVE
BUN/CREAT SERPL: 17
CALCIUM SERPL-MCNC: 9.4 MG/DL (ref 8.5–10.1)
CHLORIDE SERPL-SCNC: 103 MMOL/L (ref 98–107)
CO2 SERPL-SCNC: 20 MMOL/L (ref 21–32)
COCAINE UR QL: NEGATIVE
CREAT SERPL-MCNC: 0.96 MG/DL (ref 0.6–1.3)
EOSINOPHIL # BLD AUTO: 0.1 10^3/UL (ref 0–0.3)
EOSINOPHIL NFR BLD AUTO: 2 % (ref 0–10)
FIBRINOGEN PPP-MCNC: CLEAR MG/DL
GFR SERPLBLD BASED ON 1.73 SQ M-ARVRAT: > 60 ML/MIN
GLUCOSE SERPL-MCNC: 97 MG/DL (ref 70–105)
GLUCOSE UR STRIP-MCNC: NEGATIVE MG/DL
HCT VFR BLD CALC: 42 % (ref 40–54)
HGB BLD-MCNC: 14.6 G/DL (ref 13.3–17.7)
KETONES UR QL STRIP: NEGATIVE
LEUKOCYTE ESTERASE UR QL STRIP: NEGATIVE
LYMPHOCYTES # BLD AUTO: 1.7 10^3/UL (ref 1–4)
LYMPHOCYTES NFR BLD AUTO: 25 % (ref 12–44)
MANUAL DIFFERENTIAL PERFORMED BLD QL: NO
MCH RBC QN AUTO: 31 PG (ref 25–34)
MCHC RBC AUTO-ENTMCNC: 35 G/DL (ref 32–36)
MCV RBC AUTO: 89 FL (ref 80–99)
METHADONE UR QL SCN: NEGATIVE
METHAMPHETAMINE SCREEN URINE S: NEGATIVE
MONOCYTES # BLD AUTO: 0.7 10^3/UL (ref 0–1)
MONOCYTES NFR BLD AUTO: 10 % (ref 0–12)
NEUTROPHILS # BLD AUTO: 4.2 10^3/UL (ref 1.8–7.8)
NEUTROPHILS NFR BLD AUTO: 62 % (ref 42–75)
NITRITE UR QL STRIP: NEGATIVE
OPIATES UR QL SCN: NEGATIVE
OXYCODONE UR QL: NEGATIVE
PH UR STRIP: 6 [PH] (ref 5–9)
PLATELET # BLD: 338 10^3/UL (ref 130–400)
PMV BLD AUTO: 10.3 FL (ref 9–12.2)
POTASSIUM SERPL-SCNC: 4.1 MMOL/L (ref 3.6–5)
PROPOXYPH UR QL: NEGATIVE
PROT SERPL-MCNC: 6.8 GM/DL (ref 6.4–8.2)
PROT UR QL STRIP: NEGATIVE
RBC #/AREA URNS HPF: (no result) /HPF
SALICYLATES SERPL-MCNC: < 5 MG/DL (ref 5–20)
SODIUM SERPL-SCNC: 138 MMOL/L (ref 135–145)
SP GR UR STRIP: 1.02 (ref 1.02–1.02)
TRICYCLICS UR QL SCN: NEGATIVE
WBC # BLD AUTO: 6.7 10^3/UL (ref 4.3–11)
WBC #/AREA URNS HPF: (no result) /HPF

## 2021-05-30 PROCEDURE — 80306 DRUG TEST PRSMV INSTRMNT: CPT

## 2021-05-30 PROCEDURE — 84443 ASSAY THYROID STIM HORMONE: CPT

## 2021-05-30 PROCEDURE — 85025 COMPLETE CBC W/AUTO DIFF WBC: CPT

## 2021-05-30 PROCEDURE — 80329 ANALGESICS NON-OPIOID 1 OR 2: CPT

## 2021-05-30 PROCEDURE — 36415 COLL VENOUS BLD VENIPUNCTURE: CPT

## 2021-05-30 PROCEDURE — 99283 EMERGENCY DEPT VISIT LOW MDM: CPT

## 2021-05-30 PROCEDURE — 80053 COMPREHEN METABOLIC PANEL: CPT

## 2021-05-30 PROCEDURE — 93005 ELECTROCARDIOGRAM TRACING: CPT

## 2021-05-30 PROCEDURE — 80320 DRUG SCREEN QUANTALCOHOLS: CPT

## 2021-05-30 PROCEDURE — 81000 URINALYSIS NONAUTO W/SCOPE: CPT

## 2021-05-30 SDOH — ECONOMIC STABILITY - HOUSING INSECURITY: HOMELESSNESS: Z59.0

## 2021-05-30 NOTE — ED PSYCHOSOCIAL
General


Chief Complaint:  Psych/Social Disorder


Stated Complaint:  PSYCH EVAL


Nursing Triage Note:  


ARRIVED VIA AMB WITH EMS FROM HOME. STATES HE IS WANTING TO HARM HIMSELF AND HAS




A PLAN TO OBTAIN FENTENYL AND WONDER OFF.  STATES HE WOULD LIKE TO BE ADMITTED 


TO [facility] PSYCH DEPT.


Source:  patient


Exam Limitations:  no limitations


 (PRASHANT GRIJALVA MD)





History of Present Illness


Date Seen by Provider:  May 30, 2021


Time Seen by Provider:  16:40


Initial Comments


This is 66-year-old gentleman presents to the emergency room via EMS with 

complaints of increasing depression over the past several days and suicidal 

ideation.  When asked about a plan he states he would get some fentanyl and 

wander off.  He reports prior admission at [facility] for suicidal 

ideation.  He is established with a  and behavioral health.  He 

denies any drug or alcohol abuse.  He states no drug or alcohol use for about 3 

months.


 (PRASHANT GRIJALVA MD)





Allergies and Home Medications


Allergies


Coded Allergies:  


     No Known Drug Allergies (Unverified , 8/25/19)





Home Medications


Lithium Carbonate 300 Mg Capsule, 300 MG PO BID, (Reported)





Patient Home Medication List


Home Medication List Reviewed:  Yes


 (PRASHANT GRIJALVA MD)





Review of Systems


Constitutional:  no symptoms reported


EENTM:  no symptoms reported


Respiratory:  no symptoms reported


Cardiovascular:  no symptoms reported


Gastrointestinal:  no symptoms reported


Genitourinary:  no symptoms reported


Musculoskeletal:  no symptoms reported


Skin:  no symptoms reported


Psychiatric/Neurological:  See HPI (PRASHANT GRIJALVA MD)





Past Medical-Social-Family Hx


Past Med/Social Hx:  Reviewed Nursing Past Med/Soc Hx


 (PRASHANT GRIJALVA MD)





Patient Social History


Alcohol Use:  Past History


Drug of Choice:  METHAMPHETAMINE, HEROIN, THC--DENIES IV USE


Smoking Status:  Former Smoker


Type Used:  Cigarettes


2nd Hand Smoke Exposure:  No


Recent Infectious Disease Expo:  No


Recent Hopitalizations:  No


 (PRASHANT GRIJALVA MD)





Immunizations Up To Date


Tetanus Booster (TDap):  Unknown


Date of Influenza Vaccine:  Oct 1, 2021


 (PRASHANT GRIJALVA MD)





Seasonal Allergies


Seasonal Allergies:  Yes


 (PRASHANT GRIJALVA MD)





Past Medical History


Surgeries:  Yes (HERNIA REPAIR)


Abdominal, Transurethral Resection


Respiratory:  No


Cardiac:  No


Neurological:  No


Genitourinary:  Yes (S/P TURP)


Benign Prostatic Hyperpl, Prostate Problems


Gastrointestinal:  Yes (LEFT INGUINAL HERNIA)


Abdominal Hernia


Musculoskeletal:  No


Endocrine:  No


HEENT:  No


Cancer:  No


Psychosocial:  Yes ("BIPOLAR TYPE 2-SCHIZOID NO DILUSIONS";SUICIDAL 

IDEATIONS;POLYSUBSTANCE ABU)


Sleep Difficulties, Anxiety, Suicide Attempts, Bipolar, Schizophrenia, 

Depression


Integumentary:  No


Blood Disorders:  No


 (PRASHANT GRIJALVA MD)





Family Medical History


No Pertinent Family Hx





SOCIAL HISTORY:


-ETOH--2-3 MIXED DRINKS A DAY. NONE FOR 2-3 MONTHS, PER PT ON 05/14/21


-DRUGS--METHAMPHETAMINES, BLACK TAR HEROIN, THC. DENIES IV USE. CLAIMS NO


USE FOR 2 WEEKS, PER PT ON 05/14/21


-SMOKED 1 PPD, STATES HE QUIT 2-3 MONTHS AGO, PER PT ON 05/14/21








ADDITIONAL HISTORY: 


-MULTIPLE PSYCH ADMITS, INCLUDING [facility]


ALSO [facility], [facility], [facility],


[place] [facility], OTHERS.





LONG HISTORY OF HOMELESSNESS, LIVING IN LOCAL HOTELS AT INTERVALS


 (PRASHANT GRIJALVA MD)





Physical Exam





Vital Signs - First Documented








 5/30/21





 16:39


 


Temp 36.7


 


Pulse 105


 


Resp 16


 


B/P (MAP) 156/98 (117)


 


Pulse Ox 98


 


O2 Delivery Room Air





 (JOLIE PINON)


Capillary Refill : Less Than 3 Seconds 


 (PRASHANT GRIJALVA MD)


Height, Weight, BMI


Height: 5'10.00"


Weight: 150lbs. oz. 68.743998qv; 20.00 BMI


Method:Stated


General Appearance:  WD/WN, no apparent distress


HEENT:  PERRL/EOMI, normal ENT inspection


Neck:  normal inspection


Respiratory:  lungs clear, normal breath sounds, no respiratory distress, no 

accessory muscle use


Cardiovascular:  regular rate, rhythm, no edema, no murmur


Gastrointestinal:  normal bowel sounds, non tender, soft


Extremities:  normal inspection, no pedal edema


Neurologic/Psychiatric:  CNs II-XII nml as tested, no motor/sensory deficits, 

alert, oriented x 3, abnormal CNs II-XII, other (Reports suicidal ideation with 

plan)


Appearance/Memory:  appropriate appearance, appropriate insight


Behavior/Eye Contact:  cooperative, good eye contact


Thoughts/Hallucinations:  no apparent hallucination


Skin:  normal color, warm/dry (PRASHANT GRIJALVA MD)





Progress/Results/Core Measures


Results/Orders


Lab Results





Laboratory Tests








Test


 5/30/21


17:05 5/30/21


17:22 Range/Units


 


 


Urine Color YELLOW    


 


Urine Clarity CLEAR    


 


Urine pH 6.0   5-9  


 


Urine Specific Gravity 1.025 H  1.016-1.022  


 


Urine Protein NEGATIVE   NEGATIVE  


 


Urine Glucose (UA) NEGATIVE   NEGATIVE  


 


Urine Ketones NEGATIVE   NEGATIVE  


 


Urine Nitrite NEGATIVE   NEGATIVE  


 


Urine Bilirubin NEGATIVE   NEGATIVE  


 


Urine Urobilinogen 0.2   < = 1.0  MG/DL


 


Urine Leukocyte Esterase NEGATIVE   NEGATIVE  


 


Urine RBC (Auto) NEGATIVE   NEGATIVE  


 


Urine RBC NONE    /HPF


 


Urine WBC 0-2    /HPF


 


Urine Crystals PRESENT H   /LPF


 


Urine Amorphous Sediment


 RARE RANI


URATES H 


  /LPF





 


Urine Bacteria NEGATIVE    /HPF


 


Urine Casts NONE    /LPF


 


Urine Mucus MODERATE H   /LPF


 


Urine Culture Indicated NO    


 


Urine Opiates Screen NEGATIVE   NEGATIVE  


 


Urine Oxycodone Screen NEGATIVE   NEGATIVE  


 


Urine Methadone Screen NEGATIVE   NEGATIVE  


 


Urine Propoxyphene Screen NEGATIVE   NEGATIVE  


 


Urine Barbiturates Screen NEGATIVE   NEGATIVE  


 


Ur Tricyclic Antidepressants


Screen NEGATIVE 


 


 NEGATIVE  





 


Urine Phencyclidine Screen NEGATIVE   NEGATIVE  


 


Urine Amphetamines Screen NEGATIVE   NEGATIVE  


 


Urine Methamphetamines Screen NEGATIVE   NEGATIVE  


 


Urine Benzodiazepines Screen NEGATIVE   NEGATIVE  


 


Urine Cocaine Screen NEGATIVE   NEGATIVE  


 


Urine Cannabinoids Screen NEGATIVE   NEGATIVE  


 


White Blood Count


 


 6.7 


 4.3-11.0


10^3/uL


 


Red Blood Count


 


 4.67 


 4.30-5.52


10^6/uL


 


Hemoglobin  14.6  13.3-17.7  g/dL


 


Hematocrit  42  40-54  %


 


Mean Corpuscular Volume  89  80-99  fL


 


Mean Corpuscular Hemoglobin  31  25-34  pg


 


Mean Corpuscular Hemoglobin


Concent 


 35 


 32-36  g/dL





 


Red Cell Distribution Width  13.2  10.0-14.5  %


 


Platelet Count


 


 338 


 130-400


10^3/uL


 


Mean Platelet Volume  10.3  9.0-12.2  fL


 


Immature Granulocyte % (Auto)  0   %


 


Neutrophils (%) (Auto)  62  42-75  %


 


Lymphocytes (%) (Auto)  25  12-44  %


 


Monocytes (%) (Auto)  10  0-12  %


 


Eosinophils (%) (Auto)  2  0-10  %


 


Basophils (%) (Auto)  1  0-10  %


 


Neutrophils # (Auto)


 


 4.2 


 1.8-7.8


10^3/uL


 


Lymphocytes # (Auto)


 


 1.7 


 1.0-4.0


10^3/uL


 


Monocytes # (Auto)


 


 0.7 


 0.0-1.0


10^3/uL


 


Eosinophils # (Auto)


 


 0.1 


 0.0-0.3


10^3/uL


 


Basophils # (Auto)


 


 0.1 


 0.0-0.1


10^3/uL


 


Immature Granulocyte # (Auto)


 


 0.0 


 0.0-0.1


10^3/uL


 


Sodium Level  138  135-145  MMOL/L


 


Potassium Level  4.1  3.6-5.0  MMOL/L


 


Chloride Level  103    MMOL/L


 


Carbon Dioxide Level  20 L 21-32  MMOL/L


 


Anion Gap  15 H 5-14  MMOL/L


 


Blood Urea Nitrogen  16  7-18  MG/DL


 


Creatinine


 


 0.96 


 0.60-1.30


MG/DL


 


Estimat Glomerular Filtration


Rate 


 > 60 


  





 


BUN/Creatinine Ratio  17   


 


Glucose Level  97    MG/DL


 


Calcium Level  9.4  8.5-10.1  MG/DL


 


Corrected Calcium  9.2  8.5-10.1  MG/DL


 


Total Bilirubin  0.3  0.1-1.0  MG/DL


 


Aspartate Amino Transf


(AST/SGOT) 


 14 


 5-34  U/L





 


Alanine Aminotransferase


(ALT/SGPT) 


 9 


 0-55  U/L





 


Alkaline Phosphatase  102    U/L


 


Total Protein  6.8  6.4-8.2  GM/DL


 


Albumin  4.3  3.2-4.5  GM/DL


 


TSH Cascade Testing


 


 1.64 


 0.35-4.94


UIU/ML


 


Salicylates Level  < 5.0 L 5.0-20.0  MG/DL


 


Acetaminophen Level  < 10 L 10-30  UG/ML


 


Serum Alcohol  < 10  <10  MG/DL





 (JOLIE PINON)


Vital Signs/I&O











 5/30/21 5/30/21





 16:39 18:27


 


Temp 36.7 36.7


 


Pulse 105 81


 


Resp 16 15


 


B/P (MAP) 156/98 (117) 117/96 (103)


 


Pulse Ox 98 98


 


O2 Delivery Room Air Room Air





 (JOLIE PINON)








Blood Pressure Mean:                    117











Progress


Progress Note :  


   Time:  18:24


Progress Note


I have contacted the [facility] hotline. We are faxing the chart for 

review. Care of this patient is being transitioned to Dr. Pinon at this time.


 (PRASHANT GRIJALVA MD)


Progress Note #1:  


Progress Note


I agree with the above documented history and physical exam.  The patient is res

ting comfortably in his room without acute, pressing concern.  [facility] 

mental health screener has been contacted and information has been faxed over to

them.  They will talk to the patient next.


Progress Note #2:  


   Time:  21:36


Progress Note


Screener has visited with the patient and also with Tye landry and after I spoke 

with them we feel the patient should be okay to go home with a safety plan.  

Screener has discussed this plan with the patient.  Will allow him to discharge 

with outpatient follow-up.


 (JOLIE PINON)


Initial ECG Impression Date:  May 30, 2021


Initial ECG Impression Time:  16:43


Initial ECG Rate:  94


Initial ECG Rhythm:  Normal Sinus


Initial ECG Intervals:  Normal


Initial ECG Impression:  Normal


Comment


Normal sinus rhythm with no ST elevation or depression.  No abnormal intervals 

or axis deviation.  PVC noted.


 (PRASHANT GRIJALVA MD)





Departure


Impression





   Primary Impression:  


   Suicidal ideations


Disposition:  01 HOME, SELF-CARE


Condition:  Stable





Departure-Patient Inst.


Decision time for Depature:  21:36


 (JOLIE PINON)


Referrals:  


[facility] (PCP/Family)


Primary Care Physician


Patient Instructions:  Depression, Adult (DC), Suicide Prevention





Add. Discharge Instructions:  


Keep your follow-up plan as lined up by the mental health screener.


Call [phone] if you have any further concerns.





All discharge instructions reviewed with patient and/or family. Voiced 

understanding.











PRASHANT GRIJALVA MD        May 30, 2021 17:53


JOLIE PINON                 May 30, 2021 18:48

## 2021-06-13 ENCOUNTER — HOSPITAL ENCOUNTER (EMERGENCY)
Dept: HOSPITAL 75 - ER | Age: 66
Discharge: HOME | End: 2021-06-13
Payer: MEDICARE

## 2021-06-13 VITALS — BODY MASS INDEX: 22.12 KG/M2 | WEIGHT: 152.78 LBS | HEIGHT: 69.69 IN

## 2021-06-13 VITALS — SYSTOLIC BLOOD PRESSURE: 124 MMHG | DIASTOLIC BLOOD PRESSURE: 85 MMHG

## 2021-06-13 DIAGNOSIS — Z20.822: ICD-10-CM

## 2021-06-13 DIAGNOSIS — Z87.891: ICD-10-CM

## 2021-06-13 DIAGNOSIS — R45.851: Primary | ICD-10-CM

## 2021-06-13 LAB
ALBUMIN SERPL-MCNC: 3.9 GM/DL (ref 3.2–4.5)
ALP SERPL-CCNC: 87 U/L (ref 40–136)
ALT SERPL-CCNC: 10 U/L (ref 0–55)
APAP SERPL-MCNC: < 10 UG/ML (ref 10–30)
APTT PPP: YELLOW S
BACTERIA #/AREA URNS HPF: (no result) /HPF
BARBITURATES UR QL: NEGATIVE
BASOPHILS # BLD AUTO: 0.1 10^3/UL (ref 0–0.1)
BASOPHILS NFR BLD AUTO: 1 % (ref 0–10)
BENZODIAZ UR QL SCN: NEGATIVE
BILIRUB SERPL-MCNC: 0.4 MG/DL (ref 0.1–1)
BILIRUB UR QL STRIP: NEGATIVE
BUN/CREAT SERPL: 17
CALCIUM SERPL-MCNC: 9 MG/DL (ref 8.5–10.1)
CHLORIDE SERPL-SCNC: 104 MMOL/L (ref 98–107)
CO2 SERPL-SCNC: 22 MMOL/L (ref 21–32)
COCAINE UR QL: NEGATIVE
CREAT SERPL-MCNC: 0.84 MG/DL (ref 0.6–1.3)
EOSINOPHIL # BLD AUTO: 0.2 10^3/UL (ref 0–0.3)
EOSINOPHIL NFR BLD AUTO: 3 % (ref 0–10)
FIBRINOGEN PPP-MCNC: CLEAR MG/DL
GFR SERPLBLD BASED ON 1.73 SQ M-ARVRAT: > 60 ML/MIN
GLUCOSE SERPL-MCNC: 85 MG/DL (ref 70–105)
GLUCOSE UR STRIP-MCNC: NEGATIVE MG/DL
HCT VFR BLD CALC: 41 % (ref 40–54)
HGB BLD-MCNC: 14.3 G/DL (ref 13.3–17.7)
HYALINE CASTS #/AREA URNS LPF: (no result) /LPF
KETONES UR QL STRIP: NEGATIVE
LEUKOCYTE ESTERASE UR QL STRIP: NEGATIVE
LYMPHOCYTES # BLD AUTO: 1.5 10^3/UL (ref 1–4)
LYMPHOCYTES NFR BLD AUTO: 23 % (ref 12–44)
MANUAL DIFFERENTIAL PERFORMED BLD QL: NO
MCH RBC QN AUTO: 31 PG (ref 25–34)
MCHC RBC AUTO-ENTMCNC: 35 G/DL (ref 32–36)
MCV RBC AUTO: 90 FL (ref 80–99)
METHADONE UR QL SCN: NEGATIVE
METHAMPHETAMINE SCREEN URINE S: NEGATIVE
MONOCYTES # BLD AUTO: 0.7 10^3/UL (ref 0–1)
MONOCYTES NFR BLD AUTO: 10 % (ref 0–12)
NEUTROPHILS # BLD AUTO: 4.1 10^3/UL (ref 1.8–7.8)
NEUTROPHILS NFR BLD AUTO: 63 % (ref 42–75)
NITRITE UR QL STRIP: NEGATIVE
OPIATES UR QL SCN: NEGATIVE
OXYCODONE UR QL: NEGATIVE
PH UR STRIP: 6 [PH] (ref 5–9)
PLATELET # BLD: 280 10^3/UL (ref 130–400)
PMV BLD AUTO: 10.3 FL (ref 9–12.2)
POTASSIUM SERPL-SCNC: 4 MMOL/L (ref 3.6–5)
PROPOXYPH UR QL: NEGATIVE
PROT SERPL-MCNC: 6.4 GM/DL (ref 6.4–8.2)
PROT UR QL STRIP: NEGATIVE
RBC #/AREA URNS HPF: (no result) /HPF
SALICYLATES SERPL-MCNC: < 5 MG/DL (ref 5–20)
SODIUM SERPL-SCNC: 137 MMOL/L (ref 135–145)
SP GR UR STRIP: >=1.03 (ref 1.02–1.02)
SQUAMOUS #/AREA URNS HPF: (no result) /HPF
TRICYCLICS UR QL SCN: NEGATIVE
WBC # BLD AUTO: 6.6 10^3/UL (ref 4.3–11)
WBC #/AREA URNS HPF: (no result) /HPF

## 2021-06-13 PROCEDURE — 85025 COMPLETE CBC W/AUTO DIFF WBC: CPT

## 2021-06-13 PROCEDURE — 99283 EMERGENCY DEPT VISIT LOW MDM: CPT

## 2021-06-13 PROCEDURE — 36415 COLL VENOUS BLD VENIPUNCTURE: CPT

## 2021-06-13 PROCEDURE — 87636 SARSCOV2 & INF A&B AMP PRB: CPT

## 2021-06-13 PROCEDURE — 81000 URINALYSIS NONAUTO W/SCOPE: CPT

## 2021-06-13 PROCEDURE — 80306 DRUG TEST PRSMV INSTRMNT: CPT

## 2021-06-13 PROCEDURE — 93005 ELECTROCARDIOGRAM TRACING: CPT

## 2021-06-13 PROCEDURE — 80329 ANALGESICS NON-OPIOID 1 OR 2: CPT

## 2021-06-13 PROCEDURE — 80053 COMPREHEN METABOLIC PANEL: CPT

## 2021-06-13 PROCEDURE — 80320 DRUG SCREEN QUANTALCOHOLS: CPT

## 2021-06-13 NOTE — ED PSYCHOSOCIAL
General


Chief Complaint:  Psych/Social Disorder


Stated Complaint:  SUICIDAL IDEATIONS


Nursing Triage Note:  


PT PRESENTS TO ED VIA EMS FOR HELP WITH MEDICATION AND STABILITY. PT REPORTS 


RACING THOUGHTS AND SLEEP ISSUES. PT ALSO REPORTS HE STRUGGLES WITH PTSD FROM 


CHILDHOOD.


Source:  patient


Exam Limitations:  no limitations


 (JOLIE OBRIEN)





History of Present Illness


Date Seen by Provider:  Jun 13, 2021


Time Seen by Provider:  16:00


Initial Comments


Patient presents ER by EMS from the [facility] where he is staying.  He reports lots of

history of PTSD and childhood traumas that over the past 3 to 4 days have been 

progressively making him feel worse and become suicidal.  He has a plan to take 

all of his medications to kill himself.  He has had suicide attempts in the 

past.  His most recent visit was 2 weeks ago to the ER.  At that time his 

suicidal ideations were set up for outpatient management through the clinic.  In

the past 3 days he did call Jose M at [facility] and felt like 

his concerns were being blown off so he decided to come here seeking inpatient 

stay in hospital voluntarily.  He denies any fever chills cough nausea vomiting 

diarrhea.


 (JOLIE OBRIEN)





Allergies and Home Medications


Allergies


Coded Allergies:  


     No Known Drug Allergies (Unverified , 8/25/19)





Home Medications


Lithium Carbonate 300 Mg Capsule, 300 MG PO BID, (Reported)





Patient Home Medication List


Home Medication List Reviewed:  Yes


 (JOLIE OBRIEN)





Review of Systems


Constitutional:  No chills, No diaphoresis


EENTM:  No ear discharge, No ear pain


Respiratory:  No cough, No short of breath


Cardiovascular:  No chest pain, No palpitations


Gastrointestinal:  No abdominal pain, No nausea, No vomiting


Genitourinary:  No discharge, No dysuria


Musculoskeletal:  No back pain, No joint pain (JOLIE OBRIEN)





All Other Systems Reviewed


Negative Unless Noted:  Yes


 (JOLIE OBRIEN)





Past Medical-Social-Family Hx


Patient Social History


Alcohol Use:  Denies Use


Drug of Choice:  METHAMPHETAMINE, HEROIN, THC--DENIES IV USE


Smoking Status:  Former Smoker


Type Used:  Cigarettes


Former Smoker, Quit:  Apr 13, 2021


2nd Hand Smoke Exposure:  No


Recent Infectious Disease Expo:  No


Recent Hopitalizations:  No


 (JOLIE OBRIEN)





Immunizations Up To Date


Tetanus Booster (TDap):  Unknown


Date of Influenza Vaccine:  Oct 1, 2021


 (JOLIE OBRIEN)





Seasonal Allergies


Seasonal Allergies:  Yes


 (JOLIE OBRIEN)





Past Medical History


Surgeries:  Yes (HERNIA REPAIR, prostate removed)


Abdominal, Transurethral Resection


Respiratory:  No


Cardiac:  No


Neurological:  No


Genitourinary:  Yes (S/P TURP)


Benign Prostatic Hyperpl, Prostate Problems


Gastrointestinal:  Yes (LEFT INGUINAL HERNIA)


Abdominal Hernia


Musculoskeletal:  No


Endocrine:  No


HEENT:  No


Cancer:  No


Psychosocial:  Yes ("BIPOLAR TYPE 2-SCHIZOID NO DILUSIONS";SUICIDAL 

IDEATIONS;POLYSUBSTANCE ABU)


Sleep Difficulties, Anxiety, Suicide Attempts, Bipolar, Schizophrenia, 

Depression


Integumentary:  No


Blood Disorders:  No


 (JOLIE OBRIEN)





Family Medical History


No Pertinent Family Hx





SOCIAL HISTORY:


-ETOH--2-3 MIXED DRINKS A DAY. NONE FOR 2-3 MONTHS, PER PT ON 05/14/21


-DRUGS--METHAMPHETAMINES, BLACK TAR HEROIN, THC. DENIES IV USE. CLAIMS NO


USE FOR 2 WEEKS, PER PT ON 05/14/21


-SMOKED 1 PPD, STATES HE QUIT 2-3 MONTHS AGO, PER PT ON 05/14/21








ADDITIONAL HISTORY: 


-MULTIPLE PSYCH ADMITS, INCLUDING [facility]


ALSO [facility], [facility], [facility],


[place] [facility], OTHERS.





LONG HISTORY OF HOMELESSNESS, LIVING IN LOCAL HOTELS AT INTERVALS


 (JOLIE OBRIEN)





Physical Exam





Vital Signs - First Documented








 6/13/21





 15:56


 


Temp 36.8


 


Pulse 100


 


Resp 18


 


B/P (MAP) 120/75 (90)


 


Pulse Ox 98





 (DEL,KIMBERLY K DO)


Capillary Refill : Less Than 3 Seconds 


 (JOLIE OBRIEN)


Height, Weight, BMI


Height: 5'10.00"


Weight: 150lbs. oz. 68.047391qj; 22.00 BMI


Method:Stated


General Appearance:  WD/WN, mild distress


HEENT:  PERRL/EOMI, pharynx normal


Neck:  non-tender, full range of motion


Respiratory:  lungs clear, normal breath sounds, no respiratory distress, no 

accessory muscle use


Cardiovascular:  normal peripheral pulses, regular rate, rhythm


Peripheral Pulses:  2+ Radial Pulses (R), 2+ Radial Pulses (L)


Gastrointestinal:  normal bowel sounds, non tender, soft


Extremities:  normal range of motion, non-tender, normal inspection, normal 

capillary refill


Neurologic/Psychiatric:  alert, normal mood/affect, oriented x 3


Appearance/Memory:  appropriate appearance, appropriate insight


Behavior/Eye Contact:  cooperative, good eye contact, normal speech


Thoughts/Hallucinations:  normal thought pattern, no apparent hallucination 

(JOLIE OBRIEN)





Progress/Results/Core Measures


Results/Orders


Lab Results





Laboratory Tests








Test


 6/13/21


16:13 6/13/21


17:25 6/13/21


17:31 Range/Units


 


 


Urine Color YELLOW     


 


Urine Clarity CLEAR     


 


Urine pH 6.0    5-9  


 


Urine Specific Gravity >=1.030    1.016-1.022  


 


Urine Protein NEGATIVE    NEGATIVE  


 


Urine Glucose (UA) NEGATIVE    NEGATIVE  


 


Urine Ketones NEGATIVE    NEGATIVE  


 


Urine Nitrite NEGATIVE    NEGATIVE  


 


Urine Bilirubin NEGATIVE    NEGATIVE  


 


Urine Urobilinogen 0.2    < = 1.0  MG/DL


 


Urine Leukocyte Esterase NEGATIVE    NEGATIVE  


 


Urine RBC (Auto) NEGATIVE    NEGATIVE  


 


Urine RBC NONE     /HPF


 


Urine WBC 0-2     /HPF


 


Urine Squamous Epithelial


Cells RARE 


 


 


  /HPF





 


Urine Crystals NONE     /LPF


 


Urine Bacteria TRACE     /HPF


 


Urine Casts PRESENT     /LPF


 


Urine Hyaline Casts 0-2 H    /LPF


 


Urine Mucus SMALL H    /LPF


 


Urine Culture Indicated NO     


 


Urine Opiates Screen NEGATIVE    NEGATIVE  


 


Urine Oxycodone Screen NEGATIVE    NEGATIVE  


 


Urine Methadone Screen NEGATIVE    NEGATIVE  


 


Urine Propoxyphene Screen NEGATIVE    NEGATIVE  


 


Urine Barbiturates Screen NEGATIVE    NEGATIVE  


 


Ur Tricyclic Antidepressants


Screen NEGATIVE 


 


 


 NEGATIVE  





 


Urine Phencyclidine Screen NEGATIVE    NEGATIVE  


 


Urine Amphetamines Screen NEGATIVE    NEGATIVE  


 


Urine Methamphetamines Screen NEGATIVE    NEGATIVE  


 


Urine Benzodiazepines Screen NEGATIVE    NEGATIVE  


 


Urine Cocaine Screen NEGATIVE    NEGATIVE  


 


Urine Cannabinoids Screen NEGATIVE    NEGATIVE  


 


SARS-CoV-2 RNA (RT-PCR)  Not Detected   Not Detecte  


 


White Blood Count


 


 


 6.6 


 4.3-11.0


10^3/uL


 


Red Blood Count


 


 


 4.61 


 4.30-5.52


10^6/uL


 


Hemoglobin   14.3  13.3-17.7  g/dL


 


Hematocrit   41  40-54  %


 


Mean Corpuscular Volume   90  80-99  fL


 


Mean Corpuscular Hemoglobin   31  25-34  pg


 


Mean Corpuscular Hemoglobin


Concent 


 


 35 


 32-36  g/dL





 


Red Cell Distribution Width   13.2  10.0-14.5  %


 


Platelet Count


 


 


 280 


 130-400


10^3/uL


 


Mean Platelet Volume   10.3  9.0-12.2  fL


 


Immature Granulocyte % (Auto)   1   %


 


Neutrophils (%) (Auto)   63  42-75  %


 


Lymphocytes (%) (Auto)   23  12-44  %


 


Monocytes (%) (Auto)   10  0-12  %


 


Eosinophils (%) (Auto)   3  0-10  %


 


Basophils (%) (Auto)   1  0-10  %


 


Neutrophils # (Auto)


 


 


 4.1 


 1.8-7.8


10^3/uL


 


Lymphocytes # (Auto)


 


 


 1.5 


 1.0-4.0


10^3/uL


 


Monocytes # (Auto)


 


 


 0.7 


 0.0-1.0


10^3/uL


 


Eosinophils # (Auto)


 


 


 0.2 


 0.0-0.3


10^3/uL


 


Basophils # (Auto)


 


 


 0.1 


 0.0-0.1


10^3/uL


 


Immature Granulocyte # (Auto)


 


 


 0.0 


 0.0-0.1


10^3/uL


 


Sodium Level   137  135-145  MMOL/L


 


Potassium Level   4.0  3.6-5.0  MMOL/L


 


Chloride Level   104    MMOL/L


 


Carbon Dioxide Level   22  21-32  MMOL/L


 


Anion Gap   11  5-14  MMOL/L


 


Blood Urea Nitrogen   14  7-18  MG/DL


 


Creatinine


 


 


 0.84 


 0.60-1.30


MG/DL


 


Estimat Glomerular Filtration


Rate 


 


 > 60 


  





 


BUN/Creatinine Ratio   17   


 


Glucose Level   85    MG/DL


 


Calcium Level   9.0  8.5-10.1  MG/DL


 


Corrected Calcium   9.1  8.5-10.1  MG/DL


 


Total Bilirubin   0.4  0.1-1.0  MG/DL


 


Aspartate Amino Transf


(AST/SGOT) 


 


 10 


 5-34  U/L





 


Alanine Aminotransferase


(ALT/SGPT) 


 


 10 


 0-55  U/L





 


Alkaline Phosphatase   87    U/L


 


Total Protein   6.4  6.4-8.2  GM/DL


 


Albumin   3.9  3.2-4.5  GM/DL


 


Salicylates Level   < 5.0 L 5.0-20.0  MG/DL


 


Acetaminophen Level   < 10 L 10-30  UG/ML


 


Serum Alcohol   < 10  <10  MG/DL





 (NAYELI MERRILLA K DO)


Vital Signs/I&O











 6/13/21





 15:56


 


Temp 36.8


 


Pulse 100


 


Resp 18


 


B/P (MAP) 120/75 (90)


 


Pulse Ox 98





 (NAYELI MERRILLA K DO)








Blood Pressure Mean:                    90











Progress


Progress Note :  


   Time:  18:07


Progress Note


Patient was offered a meal tray.  [place] mental health telescreener 

will be contacted as he is medically cleared at this time.  He has no medical or

physical symptoms or complaints.  Turned over care of the patient to Dr. Merrill.


 (JOLIE OBRIEN)


Progress Note :  


Progress Note


1800--ASSUMED CARE FROM DR. OBRIEN, PT NOW WANTING SOMETHING TO EAT, MENU GIVEN 

TO PT, RN CONTACTING [facility] FOR MENTAL HEALTH SCREEN. 


PT RECLINED AND LOUNGING  ON ER CART--OUTSTRETCHED, LEGS CROSSED AT ANKLES, 

PERUSING THE  MENU. DOES NOT APPEAR TO BE IN ANY DISCOMFORT OR DISTRESS. 





PT HAS BEEN SEEN HERE EVERY 2 WEEKS SINCE MARCH FOR THIS EXACT SAME COMPLAINT


PT KNOWN TO BE HOMELESS AND STAYING IN LOCAL MOTELS, THEN EVERY 2 WEEKS WILL 

COME TO ER STATING HE IS HAVING SUICIDAL IDEATIONS, AND WANTING VOLUNTARY  

INPATIENT PSYCHIATRIC CARE. 


PT HAS NEVER ACTUALLY ATTEMPTED SUICIDE--ONLY REPORTS THAT HE IS HAVING SUICIDAL

THOUGHTS





2040--[facility] HAVE A BED, AND INTAKE PERSON IS ON PHONE WITH PT NOW. 





2130--PT WANTING US TO "SPEED UP THE PROCESS" --PT ADVISED OF [facility] PROCESS TO

FIND PLACEMENT, WHICH HE IS WELL AWARE OF. PT REPORTS THAT HE WANTS TO GO TO 

[place], AND HE HAS BEEN REPEATEDLY ADVISED THAT THERE ARE NO BEDS AVAILABLE IN 

[place]. STATES HE DOES NOT WANT TO GO TO [place], ADVISED HIM THAT HE COULD NOT 

"PICK AND CHOOSE" WHERE HE WANTED TO GO, THAT IT WAS A MATTER OF BED 

AVAILABILITY, WHICH HE IS WELL AWARE OF  


 (KIMBERLY MERRILL DO)


Initial ECG Impression Date:  Jun 13, 2021


Initial ECG Impression Time:  17:35


Initial ECG Rate:  81


Initial ECG Rhythm:  Normal Sinus


 (KIMBERLY MERRILL DO)





Departure


Communication (Admissions)


1814--RN ON PHONE WITH [facility]


1845--TELE-SCREENER WITH PT NOW. 


1945--TELE-SCREENER ATTEMPTING TO FIND INPATIENT PLACEMENT. NO BEDS AVAILABLE AT

[place] OR IN [facility] OR [place]. 


2215--CALLED [facility], AFTER INTERVIEWING PT/ DOING INTAKE ON PT, HE STATED

TO THEM THAT HE  WAS NOT AGREEABLE TO GOING THERE, THEREFORE THEIR SERVICES WERE

NO LONGER NEEDED. 


EXHAUSTIVE EFFORTS HAVE BEEN MADE TO FIND A BED, AND AS PT IS NOT WILLING TO GO 

TO THE AVAILABLE BED IN [place], ADVISED PT THAT HE COULD FOLLOW UP WITH [facility] OR [facility] TOMORROW AND HE IS AGREEABLE TO 

THIS PLAN. SAFETY PLAN DISCUSSED. 


 (KIMBERLY MERRILL DO)





Impression





   Primary Impression:  


   Passive suicidal ideations


Disposition:  01 HOME, SELF-CARE


Condition:  Stable





Departure-Patient Inst.


Decision time for Depature:  22:17


 (KIMBERLY MERRILL DO)


Referrals:  


[facility] (PCP/Family)


Primary Care Physician


Patient Instructions:  Suicide Prevention





Add. Discharge Instructions:  


FOLLOW UP WITH [facility] OR [facility] TOMORROW 

FOR FURTHER CARE--CALL IN AM TO MAKE AN APPOINTMENT





All discharge instructions reviewed with patient and/or family. Voiced 

understanding.











JOLIE OBRIEN                 Jun 13, 2021 16:58


KIMBERLY MERRILL DO                 Jun 13, 2021 18:14

## 2021-06-19 ENCOUNTER — HOSPITAL ENCOUNTER (EMERGENCY)
Dept: HOSPITAL 75 - ER | Age: 66
Discharge: HOME | End: 2021-06-19
Payer: MEDICARE

## 2021-06-19 VITALS — DIASTOLIC BLOOD PRESSURE: 78 MMHG | SYSTOLIC BLOOD PRESSURE: 113 MMHG

## 2021-06-19 VITALS — BODY MASS INDEX: 20.04 KG/M2 | WEIGHT: 139.99 LBS | HEIGHT: 70 IN

## 2021-06-19 DIAGNOSIS — F22: Primary | ICD-10-CM

## 2021-06-19 DIAGNOSIS — Z87.891: ICD-10-CM

## 2021-06-19 LAB
ALBUMIN SERPL-MCNC: 4.3 GM/DL (ref 3.2–4.5)
ALP SERPL-CCNC: 102 U/L (ref 40–136)
ALT SERPL-CCNC: 9 U/L (ref 0–55)
BASOPHILS # BLD AUTO: 0.1 10^3/UL (ref 0–0.1)
BASOPHILS NFR BLD AUTO: 1 % (ref 0–10)
BILIRUB SERPL-MCNC: 0.7 MG/DL (ref 0.1–1)
BUN/CREAT SERPL: 16
CALCIUM SERPL-MCNC: 9.1 MG/DL (ref 8.5–10.1)
CHLORIDE SERPL-SCNC: 106 MMOL/L (ref 98–107)
CO2 SERPL-SCNC: 21 MMOL/L (ref 21–32)
CREAT SERPL-MCNC: 1.04 MG/DL (ref 0.6–1.3)
EOSINOPHIL # BLD AUTO: 0.2 10^3/UL (ref 0–0.3)
EOSINOPHIL NFR BLD AUTO: 3 % (ref 0–10)
GFR SERPLBLD BASED ON 1.73 SQ M-ARVRAT: > 60 ML/MIN
GLUCOSE SERPL-MCNC: 108 MG/DL (ref 70–105)
HCT VFR BLD CALC: 45 % (ref 40–54)
HGB BLD-MCNC: 15.9 G/DL (ref 13.3–17.7)
LYMPHOCYTES # BLD AUTO: 2.2 10^3/UL (ref 1–4)
LYMPHOCYTES NFR BLD AUTO: 35 % (ref 12–44)
MANUAL DIFFERENTIAL PERFORMED BLD QL: NO
MCH RBC QN AUTO: 32 PG (ref 25–34)
MCHC RBC AUTO-ENTMCNC: 35 G/DL (ref 32–36)
MCV RBC AUTO: 90 FL (ref 80–99)
MONOCYTES # BLD AUTO: 0.6 10^3/UL (ref 0–1)
MONOCYTES NFR BLD AUTO: 9 % (ref 0–12)
NEUTROPHILS # BLD AUTO: 3.1 10^3/UL (ref 1.8–7.8)
NEUTROPHILS NFR BLD AUTO: 51 % (ref 42–75)
PLATELET # BLD: 303 10^3/UL (ref 130–400)
PMV BLD AUTO: 10.2 FL (ref 9–12.2)
POTASSIUM SERPL-SCNC: 4.3 MMOL/L (ref 3.6–5)
PROT SERPL-MCNC: 7 GM/DL (ref 6.4–8.2)
SODIUM SERPL-SCNC: 138 MMOL/L (ref 135–145)
WBC # BLD AUTO: 6.1 10^3/UL (ref 4.3–11)

## 2021-06-19 PROCEDURE — 36415 COLL VENOUS BLD VENIPUNCTURE: CPT

## 2021-06-19 PROCEDURE — 80053 COMPREHEN METABOLIC PANEL: CPT

## 2021-06-19 PROCEDURE — 85025 COMPLETE CBC W/AUTO DIFF WBC: CPT

## 2021-06-19 NOTE — ED GENERAL
General


Stated Complaint:  SUICIDAL IDEATION


Source of Information:  Patient


Exam Limitations:  No Limitations





History of Present Illness


Date Seen by Provider:  Jun 19, 2021


Time Seen by Provider:  11:30


Initial Comments


To ER by EMS from [place] parking lot.  Police were called and patient 

stated that he was paranoid and delusional and wanted to be seen in the 

emergency room.  He was here 6 days ago and requested inpatient admission.  The 

only bed availability was in [facility].  They had a bed for him but 

he declined going up there.  He again states he wants to be admitted for his 

delusions and paranoia.


Timing/Duration:  1-2 Days


Severity:  Moderate


Associated Systoms:  Denies Symptoms





Allergies and Home Medications


Allergies


Coded Allergies:  


     No Known Drug Allergies (Unverified , 8/25/19)





Home Medications


Lithium Carbonate 300 Mg Capsule, 300 MG PO BID, (Reported)





Patient Home Medication List


Home Medication List Reviewed:  Yes





Review of Systems


Review of Systems


Constitutional:  see HPI


EENTM:  see HPI


Respiratory:  no symptoms reported


Cardiovascular:  no symptoms reported


Genitourinary:  no symptoms reported


Musculoskeletal:  no symptoms reported


Skin:  no symptoms reported


Psychiatric/Neurological:  See HPI, Anxiety


Hematologic/Lymphatic:  No Symptoms Reported


Immunological/Allergic:  no symptoms reported





Past Medical-Social-Family Hx


Patient Social History


Drug of Choice:  METHAMPHETAMINE, HEROIN, THC--DENIES IV USE


Type Used:  Cigarettes


Former Smoker, Quit:  Apr 13, 2021


2nd Hand Smoke Exposure:  No


Recent Hopitalizations:  No





Immunizations Up To Date


Tetanus Booster (TDap):  Unknown


Date of Influenza Vaccine:  Oct 1, 2021





Seasonal Allergies


Seasonal Allergies:  Yes





Past Medical History


Surgeries:  Yes (HERNIA REPAIR, prostate removed)


Abdominal, Transurethral Resection


Respiratory:  No


Cardiac:  No


Neurological:  No


Genitourinary:  Yes (S/P TURP)


Benign Prostatic Hyperpl, Prostate Problems


Gastrointestinal:  Yes (LEFT INGUINAL HERNIA)


Abdominal Hernia


Musculoskeletal:  No


Endocrine:  No


HEENT:  No


Cancer:  No


Psychosocial:  Yes ("BIPOLAR TYPE 2-SCHIZOID NO DILUSIONS";SUICIDAL 

IDEATIONS;POLYSUBSTANCE ABU)


Sleep Difficulties, Anxiety, Suicide Attempts, Bipolar, Schizophrenia, 

Depression


Integumentary:  No


Blood Disorders:  No





Family Medical History


No Pertinent Family Hx





SOCIAL HISTORY:


-ETOH--2-3 MIXED DRINKS A DAY. NONE FOR 2-3 MONTHS, PER PT ON 05/14/21


-DRUGS--METHAMPHETAMINES, BLACK TAR HEROIN, THC. DENIES IV USE. CLAIMS NO


USE FOR 2 WEEKS, PER PT ON 05/14/21


-SMOKED 1 PPD, STATES HE QUIT 2-3 MONTHS AGO, PER PT ON 05/14/21








ADDITIONAL HISTORY: 


-MULTIPLE PSYCH ADMITS, INCLUDING [facility]


ALSO [facility], [facility], [facility],


[place] [facility], OTHERS.





LONG HISTORY OF HOMELESSNESS, LIVING IN LOCAL HOTELS AT INTERVALS





Physical Exam


Vital Signs





Vital Signs - First Documented








 6/19/21





 11:30


 


Temp 36.3


 


Pulse 99


 


Resp 18


 


B/P (MAP) 115/82 (93)


 


Pulse Ox 99


 


O2 Delivery Room Air





Capillary Refill :


Height, Weight, BMI


Height: 5'10.00"


Weight: 150lbs. oz. 68.296310vk; 22.00 BMI


Method:Stated


General Appearance:  No Apparent Distress, WD/WN, Other (cooperative.  States he

would like something to help him calm down.)


Eyes:  Bilateral Eye Normal Inspection, Bilateral Eye PERRL, Bilateral Eye EOMI


HEENT:  PERRL/EOMI, TMs Normal


Neck:  Full Range of Motion, Normal Inspection


Respiratory:  No Accessory Muscle Use, No Respiratory Distress


Cardiovascular:  Regular Rate, Rhythm, Normal Peripheral Pulses


Gastrointestinal:  Non Tender, Soft


Extremity:  Normal Capillary Refill, Normal Inspection


Neurologic/Psychiatric:  Alert, Oriented x3





Progress/Results/Core Measures


Suspected Sepsis


SIRS


Temperature: 


Pulse:  


Respiratory Rate: 


 


Laboratory Tests


6/19/21 11:28: White Blood Count 6.1


Blood Pressure  / 


Mean: 


 





Laboratory Tests


6/19/21 11:28: 


Creatinine 1.04, Platelet Count 303, Total Bilirubin 0.7








Results/Orders


Lab Results





Laboratory Tests








Test


 6/19/21


11:28 Range/Units


 


 


White Blood Count


 6.1 


 4.3-11.0


10^3/uL


 


Red Blood Count


 5.02 


 4.30-5.52


10^6/uL


 


Hemoglobin 15.9  13.3-17.7  g/dL


 


Hematocrit 45  40-54  %


 


Mean Corpuscular Volume 90  80-99  fL


 


Mean Corpuscular Hemoglobin 32  25-34  pg


 


Mean Corpuscular Hemoglobin


Concent 35 


 32-36  g/dL





 


Red Cell Distribution Width 12.6  10.0-14.5  %


 


Platelet Count


 303 


 130-400


10^3/uL


 


Mean Platelet Volume 10.2  9.0-12.2  fL


 


Immature Granulocyte % (Auto) 1   %


 


Neutrophils (%) (Auto) 51  42-75  %


 


Lymphocytes (%) (Auto) 35  12-44  %


 


Monocytes (%) (Auto) 9  0-12  %


 


Eosinophils (%) (Auto) 3  0-10  %


 


Basophils (%) (Auto) 1  0-10  %


 


Neutrophils # (Auto)


 3.1 


 1.8-7.8


10^3/uL


 


Lymphocytes # (Auto)


 2.2 


 1.0-4.0


10^3/uL


 


Monocytes # (Auto)


 0.6 


 0.0-1.0


10^3/uL


 


Eosinophils # (Auto)


 0.2 


 0.0-0.3


10^3/uL


 


Basophils # (Auto)


 0.1 


 0.0-0.1


10^3/uL


 


Immature Granulocyte # (Auto)


 0.0 


 0.0-0.1


10^3/uL


 


Sodium Level 138  135-145  MMOL/L


 


Potassium Level 4.3  3.6-5.0  MMOL/L


 


Chloride Level 106    MMOL/L


 


Carbon Dioxide Level 21  21-32  MMOL/L


 


Anion Gap 11  5-14  MMOL/L


 


Blood Urea Nitrogen 17  7-18  MG/DL


 


Creatinine


 1.04 


 0.60-1.30


MG/DL


 


Estimat Glomerular Filtration


Rate > 60 


  





 


BUN/Creatinine Ratio 16   


 


Glucose Level 108 H   MG/DL


 


Calcium Level 9.1  8.5-10.1  MG/DL


 


Corrected Calcium 8.9  8.5-10.1  MG/DL


 


Total Bilirubin 0.7  0.1-1.0  MG/DL


 


Aspartate Amino Transf


(AST/SGOT) 11 


 5-34  U/L





 


Alanine Aminotransferase


(ALT/SGPT) 9 


 0-55  U/L





 


Alkaline Phosphatase 102    U/L


 


Total Protein 7.0  6.4-8.2  GM/DL


 


Albumin 4.3  3.2-4.5  GM/DL








My Orders





Orders - PARKER BELLAMY


Cbc With Automated Diff (6/19/21 11:19)


Comprehensive Metabolic Panel (6/19/21 11:19)


Alprazolam Tablet (Xanax Tablet) (6/19/21 11:30)





Medications Given in ED





Current Medications








 Medications  Dose


 Ordered  Sig/Veronique


 Route  Start Time


 Stop Time Status Last Admin


Dose Admin


 


 Alprazolam  1 mg  ONCE  ONCE


 PO  6/19/21 11:30


 6/19/21 11:31 DC 6/19/21 11:50


1 MG








Vital Signs/I&O











 6/19/21





 11:30


 


Temp 36.3


 


Pulse 99


 


Resp 18


 


B/P (MAP) 115/82 (93)


 


Pulse Ox 99


 


O2 Delivery Room Air





Capillary Refill :





Departure


Impression





   Primary Impression:  


   Paranoia


Disposition:  01 HOME, SELF-CARE


Condition:  Stable





Departure-Patient Inst.


Decision time for Depature:  11:35


Referrals:  


[facility] (PCP/Family)


Primary Care Physician


Patient Instructions:  NO INSTRUCTIONS GIVEN





Add. Discharge Instructions:  


1.  Return to ER for any concerns


2.  Follow uo With [facility].











PARKER BELLAMY APRN             Jun 19, 2021 11:34

## 2021-06-29 ENCOUNTER — HOSPITAL ENCOUNTER (EMERGENCY)
Dept: HOSPITAL 75 - ER | Age: 66
Discharge: HOME | End: 2021-06-29
Payer: MEDICARE

## 2021-06-29 VITALS — HEIGHT: 69.69 IN | BODY MASS INDEX: 20.43 KG/M2 | WEIGHT: 141.1 LBS

## 2021-06-29 VITALS — DIASTOLIC BLOOD PRESSURE: 105 MMHG | SYSTOLIC BLOOD PRESSURE: 172 MMHG

## 2021-06-29 DIAGNOSIS — Z20.822: ICD-10-CM

## 2021-06-29 DIAGNOSIS — R45.851: Primary | ICD-10-CM

## 2021-06-29 DIAGNOSIS — Z91.5: ICD-10-CM

## 2021-06-29 DIAGNOSIS — Z87.891: ICD-10-CM

## 2021-06-29 DIAGNOSIS — F20.9: ICD-10-CM

## 2021-06-29 DIAGNOSIS — F31.9: ICD-10-CM

## 2021-06-29 LAB
ALBUMIN SERPL-MCNC: 4 GM/DL (ref 3.2–4.5)
ALP SERPL-CCNC: 86 U/L (ref 40–136)
ALT SERPL-CCNC: 11 U/L (ref 0–55)
APAP SERPL-MCNC: < 10 UG/ML (ref 10–30)
APTT PPP: YELLOW S
BACTERIA #/AREA URNS HPF: NEGATIVE /HPF
BARBITURATES UR QL: NEGATIVE
BASOPHILS # BLD AUTO: 0.1 10^3/UL (ref 0–0.1)
BASOPHILS NFR BLD AUTO: 1 % (ref 0–10)
BENZODIAZ UR QL SCN: NEGATIVE
BILIRUB SERPL-MCNC: 0.6 MG/DL (ref 0.1–1)
BILIRUB UR QL STRIP: NEGATIVE
BUN/CREAT SERPL: 18
CALCIUM SERPL-MCNC: 8.8 MG/DL (ref 8.5–10.1)
CHLORIDE SERPL-SCNC: 104 MMOL/L (ref 98–107)
CO2 SERPL-SCNC: 20 MMOL/L (ref 21–32)
COCAINE UR QL: NEGATIVE
CREAT SERPL-MCNC: 0.9 MG/DL (ref 0.6–1.3)
EOSINOPHIL # BLD AUTO: 0 10^3/UL (ref 0–0.3)
EOSINOPHIL NFR BLD AUTO: 0 % (ref 0–10)
FIBRINOGEN PPP-MCNC: CLEAR MG/DL
GFR SERPLBLD BASED ON 1.73 SQ M-ARVRAT: > 60 ML/MIN
GLUCOSE SERPL-MCNC: 117 MG/DL (ref 70–105)
GLUCOSE UR STRIP-MCNC: NEGATIVE MG/DL
HCT VFR BLD CALC: 41 % (ref 40–54)
HGB BLD-MCNC: 14.2 G/DL (ref 13.3–17.7)
KETONES UR QL STRIP: NEGATIVE
LEUKOCYTE ESTERASE UR QL STRIP: NEGATIVE
LYMPHOCYTES # BLD AUTO: 1 10^3/UL (ref 1–4)
LYMPHOCYTES NFR BLD AUTO: 11 % (ref 12–44)
MANUAL DIFFERENTIAL PERFORMED BLD QL: NO
MCH RBC QN AUTO: 31 PG (ref 25–34)
MCHC RBC AUTO-ENTMCNC: 34 G/DL (ref 32–36)
MCV RBC AUTO: 91 FL (ref 80–99)
METHADONE UR QL SCN: NEGATIVE
METHAMPHETAMINE SCREEN URINE S: NEGATIVE
MONOCYTES # BLD AUTO: 0.6 10^3/UL (ref 0–1)
MONOCYTES NFR BLD AUTO: 6 % (ref 0–12)
NEUTROPHILS # BLD AUTO: 7.5 10^3/UL (ref 1.8–7.8)
NEUTROPHILS NFR BLD AUTO: 82 % (ref 42–75)
NITRITE UR QL STRIP: NEGATIVE
OPIATES UR QL SCN: NEGATIVE
OXYCODONE UR QL: NEGATIVE
PH UR STRIP: 7 [PH] (ref 5–9)
PLATELET # BLD: 274 10^3/UL (ref 130–400)
PMV BLD AUTO: 9.9 FL (ref 9–12.2)
POTASSIUM SERPL-SCNC: 3.8 MMOL/L (ref 3.6–5)
PROPOXYPH UR QL: NEGATIVE
PROT SERPL-MCNC: 6.8 GM/DL (ref 6.4–8.2)
PROT UR QL STRIP: NEGATIVE
RBC #/AREA URNS HPF: (no result) /HPF
SALICYLATES SERPL-MCNC: < 5 MG/DL (ref 5–20)
SODIUM SERPL-SCNC: 136 MMOL/L (ref 135–145)
SP GR UR STRIP: 1.02 (ref 1.02–1.02)
TRICYCLICS UR QL SCN: NEGATIVE
WBC # BLD AUTO: 9.2 10^3/UL (ref 4.3–11)
WBC #/AREA URNS HPF: (no result) /HPF

## 2021-06-29 PROCEDURE — 80320 DRUG SCREEN QUANTALCOHOLS: CPT

## 2021-06-29 PROCEDURE — 36415 COLL VENOUS BLD VENIPUNCTURE: CPT

## 2021-06-29 PROCEDURE — 93005 ELECTROCARDIOGRAM TRACING: CPT

## 2021-06-29 PROCEDURE — 80306 DRUG TEST PRSMV INSTRMNT: CPT

## 2021-06-29 PROCEDURE — 81000 URINALYSIS NONAUTO W/SCOPE: CPT

## 2021-06-29 PROCEDURE — 80053 COMPREHEN METABOLIC PANEL: CPT

## 2021-06-29 PROCEDURE — 80329 ANALGESICS NON-OPIOID 1 OR 2: CPT

## 2021-06-29 PROCEDURE — 87636 SARSCOV2 & INF A&B AMP PRB: CPT

## 2021-06-29 PROCEDURE — 99283 EMERGENCY DEPT VISIT LOW MDM: CPT

## 2021-06-29 PROCEDURE — 85025 COMPLETE CBC W/AUTO DIFF WBC: CPT

## 2021-06-29 NOTE — ED PSYCHOSOCIAL
General


Stated Complaint:  PSYCH EVAL -SUICIDAL IDEATIONS


Source:  patient


Exam Limitations:  no limitations





History of Present Illness


Date Seen by Provider:  Jun 29, 2021


Time Seen by Provider:  08:56


Initial Comments


Patient to the ER by private conveyance chief complaint of suicidal ideation off

and on for the past several weeks.  Today he has a plan to use his own pills to 

kill himself.  He has not anything to harm himself.  He is not having fever 

cough chills nausea vomiting diarrhea chest pain, shortness of breath abdominal 

pain or diarrhea.





Allergies and Home Medications


Allergies


Coded Allergies:  


     No Known Drug Allergies (Unverified , 8/25/19)





Home Medications


Lithium Carbonate 300 Mg Capsule, 300 MG PO BID, (Reported)





Patient Home Medication List


Home Medication List Reviewed:  Yes





Review of Systems


Constitutional:  No chills, No diaphoresis


EENTM:  No ear discharge, No hearing loss


Respiratory:  No cough, No short of breath


Cardiovascular:  No edema, No palpitations


Gastrointestinal:  No abdominal pain, No nausea, No vomiting


Genitourinary:  No discharge, No dysuria


Musculoskeletal:  No back pain, No joint pain


Skin:  No pruritus, No rash


Psychiatric/Neurological:  Denies Headache, Denies Numbness





All Other Systems Reviewed


Negative Unless Noted:  Yes





Past Medical-Social-Family Hx


Patient Social History


Alcohol Use:  Denies Use


Drug of Choice:  METHAMPHETAMINE, HEROIN, THC--DENIES IV USE


Smoking Status:  Former Smoker


Type Used:  Cigarettes


Former Smoker, Quit:  Apr 13, 2021


2nd Hand Smoke Exposure:  No


Recent Hopitalizations:  No





Immunizations Up To Date


Tetanus Booster (TDap):  Unknown


Date of Influenza Vaccine:  Oct 1, 2021





Seasonal Allergies


Seasonal Allergies:  Yes





Past Medical History


Surgeries:  Yes (HERNIA REPAIR, prostate removed)


Abdominal, Transurethral Resection


Respiratory:  No


Cardiac:  No


Neurological:  No


Genitourinary:  Yes (S/P TURP)


Benign Prostatic Hyperpl, Prostate Problems


Gastrointestinal:  Yes (LEFT INGUINAL HERNIA)


Abdominal Hernia


Musculoskeletal:  No


Endocrine:  No


HEENT:  No


Cancer:  No


Psychosocial:  Yes ("BIPOLAR TYPE 2-SCHIZOID NO DILUSIONS";SUICIDAL 

IDEATIONS;POLYSUBSTANCE ABU)


Sleep Difficulties, Anxiety, Suicide Attempts, Bipolar, Schizophrenia, 

Depression


Integumentary:  No


Blood Disorders:  No





Family Medical History


No Pertinent Family Hx





SOCIAL HISTORY:


-ETOH--2-3 MIXED DRINKS A DAY. NONE FOR 2-3 MONTHS, PER PT ON 05/14/21


-DRUGS--METHAMPHETAMINES, BLACK TAR HEROIN, THC. DENIES IV USE. CLAIMS NO


USE FOR 2 WEEKS, PER PT ON 05/14/21


-SMOKED 1 PPD, STATES HE QUIT 2-3 MONTHS AGO, PER PT ON 05/14/21








ADDITIONAL HISTORY: 


-MULTIPLE PSYCH ADMITS, INCLUDING [facility]


ALSO [facility], [facility], [facility],


[place] [facility], OTHERS.





LONG HISTORY OF HOMELESSNESS, LIVING IN LOCAL HOTELS AT INTERVALS





Physical Exam





Vital Signs - First Documented








 6/29/21





 09:00


 


Pulse 94


 


Resp 18


 


B/P (MAP) 172/105 (127)


 


Pulse Ox 95


 


O2 Delivery Room Air





Capillary Refill :


Height, Weight, BMI


Height: 5'10.00"


Weight: 150lbs. oz. 68.691610nx; 20.00 BMI


Method:Stated


General Appearance:  WD/WN, no apparent distress


HEENT:  PERRL/EOMI, normal ENT inspection


Neck:  full range of motion, supple, normal inspection


Respiratory:  lungs clear, normal breath sounds, no respiratory distress, no 

accessory muscle use


Cardiovascular:  normal peripheral pulses, regular rate, rhythm


Peripheral Pulses:  2+ Radial Pulses (R), 2+ Radial Pulses (L)


Gastrointestinal:  normal bowel sounds, non tender, soft


Extremities:  normal range of motion, non-tender, normal capillary refill


Neurologic/Psychiatric:  no motor/sensory deficits, alert, normal mood/affect, 

oriented x 3


Appearance/Memory:  appropriate appearance, appropriate insight


Behavior/Eye Contact:  cooperative, good eye contact, normal speech


Thoughts/Hallucinations:  normal thought pattern, no apparent hallucination, 

other (Endorses suicidal ideation with a plan passively but no action on it yet.

 No homicidal ideation.)





Progress/Results/Core Measures


Results/Orders


Lab Results





Laboratory Tests








Test


 6/29/21


09:34 6/29/21


09:37 6/29/21


09:50 Range/Units


 


 


Urine Color YELLOW     


 


Urine Clarity CLEAR     


 


Urine pH 7.0    5-9  


 


Urine Specific Gravity 1.020    1.016-1.022  


 


Urine Protein NEGATIVE    NEGATIVE  


 


Urine Glucose (UA) NEGATIVE    NEGATIVE  


 


Urine Ketones NEGATIVE    NEGATIVE  


 


Urine Nitrite NEGATIVE    NEGATIVE  


 


Urine Bilirubin NEGATIVE    NEGATIVE  


 


Urine Urobilinogen 0.2    < = 1.0  MG/DL


 


Urine Leukocyte Esterase NEGATIVE    NEGATIVE  


 


Urine RBC (Auto) NEGATIVE    NEGATIVE  


 


Urine RBC NONE     /HPF


 


Urine WBC 0-2     /HPF


 


Urine Crystals NONE     /LPF


 


Urine Bacteria NEGATIVE     /HPF


 


Urine Casts NONE     /LPF


 


Urine Mucus NEGATIVE     /LPF


 


Urine Culture Indicated NO     


 


Urine Opiates Screen NEGATIVE    NEGATIVE  


 


Urine Oxycodone Screen NEGATIVE    NEGATIVE  


 


Urine Methadone Screen NEGATIVE    NEGATIVE  


 


Urine Propoxyphene Screen NEGATIVE    NEGATIVE  


 


Urine Barbiturates Screen NEGATIVE    NEGATIVE  


 


Ur Tricyclic Antidepressants


Screen NEGATIVE 


 


 


 NEGATIVE  





 


Urine Phencyclidine Screen NEGATIVE    NEGATIVE  


 


Urine Amphetamines Screen NEGATIVE    NEGATIVE  


 


Urine Methamphetamines Screen NEGATIVE    NEGATIVE  


 


Urine Benzodiazepines Screen NEGATIVE    NEGATIVE  


 


Urine Cocaine Screen NEGATIVE    NEGATIVE  


 


Urine Cannabinoids Screen NEGATIVE    NEGATIVE  


 


SARS-CoV-2 RNA (RT-PCR)  Not Detected   Not Detecte  


 


White Blood Count


 


 


 9.2 


 4.3-11.0


10^3/uL


 


Red Blood Count


 


 


 4.56 


 4.30-5.52


10^6/uL


 


Hemoglobin   14.2  13.3-17.7  g/dL


 


Hematocrit   41  40-54  %


 


Mean Corpuscular Volume   91  80-99  fL


 


Mean Corpuscular Hemoglobin   31  25-34  pg


 


Mean Corpuscular Hemoglobin


Concent 


 


 34 


 32-36  g/dL





 


Red Cell Distribution Width   12.6  10.0-14.5  %


 


Platelet Count


 


 


 274 


 130-400


10^3/uL


 


Mean Platelet Volume   9.9  9.0-12.2  fL


 


Immature Granulocyte % (Auto)   1   %


 


Neutrophils (%) (Auto)   82 H 42-75  %


 


Lymphocytes (%) (Auto)   11 L 12-44  %


 


Monocytes (%) (Auto)   6  0-12  %


 


Eosinophils (%) (Auto)   0  0-10  %


 


Basophils (%) (Auto)   1  0-10  %


 


Neutrophils # (Auto)


 


 


 7.5 


 1.8-7.8


10^3/uL


 


Lymphocytes # (Auto)


 


 


 1.0 


 1.0-4.0


10^3/uL


 


Monocytes # (Auto)


 


 


 0.6 


 0.0-1.0


10^3/uL


 


Eosinophils # (Auto)


 


 


 0.0 


 0.0-0.3


10^3/uL


 


Basophils # (Auto)


 


 


 0.1 


 0.0-0.1


10^3/uL


 


Immature Granulocyte # (Auto)


 


 


 0.1 


 0.0-0.1


10^3/uL


 


Sodium Level   136  135-145  MMOL/L


 


Potassium Level   3.8  3.6-5.0  MMOL/L


 


Chloride Level   104    MMOL/L


 


Carbon Dioxide Level   20 L 21-32  MMOL/L


 


Anion Gap   12  5-14  MMOL/L


 


Blood Urea Nitrogen   16  7-18  MG/DL


 


Creatinine


 


 


 0.90 


 0.60-1.30


MG/DL


 


Estimat Glomerular Filtration


Rate 


 


 > 60 


  





 


BUN/Creatinine Ratio   18   


 


Glucose Level   117 H   MG/DL


 


Calcium Level   8.8  8.5-10.1  MG/DL


 


Corrected Calcium   8.8  8.5-10.1  MG/DL


 


Total Bilirubin   0.6  0.1-1.0  MG/DL


 


Aspartate Amino Transf


(AST/SGOT) 


 


 16 


 5-34  U/L





 


Alanine Aminotransferase


(ALT/SGPT) 


 


 11 


 0-55  U/L





 


Alkaline Phosphatase   86    U/L


 


Total Protein   6.8  6.4-8.2  GM/DL


 


Albumin   4.0  3.2-4.5  GM/DL


 


Salicylates Level   < 5.0 L 5.0-20.0  MG/DL


 


Acetaminophen Level   < 10 L 10-30  UG/ML


 


Serum Alcohol   < 10  <10  MG/DL








My Orders





Orders - JOLIE OBRIEN 19 Inhouse Test (6/29/21 09:04)


Ua Culture If Indicated (6/29/21 09:04)


Cbc With Automated Diff (6/29/21 09:04)


Comprehensive Metabolic Panel (6/29/21 09:04)


Alcohol (6/29/21 09:04)


Drug Screen Stat (Urine) (6/29/21 09:04)


Acetaminophen (6/29/21 09:04)


Salicylate (6/29/21 09:04)


Ekg Tracing (6/29/21 09:04)


Monitor-Rhythm Ecg Trace Only (6/29/21 09:04)


Bh Status Checks/Observation Q15M (6/29/21 09:04)


General/Regular (6/29/21 Lunch)





Vital Signs/I&O











 6/29/21





 09:00


 


Pulse 94


 


Resp 18


 


B/P (MAP) 172/105 (127)


 


Pulse Ox 95


 


O2 Delivery Room Air











Progress


Progress Note #1:  


   Time:  10:59


Progress Note


Blood work and physical exam are unremarkable.  Patient is cleared medically.  

We will have telemental health speak with him and decide appropriate 

disposition.


Progress Note #2:  


   Time:  18:27


Progress Note


We have received rejection from all local inpatient psychiatric facilities due 

to unavailable beds or disinterested in admitting him.  Explained this to him 

and [facility].  He is calling a 

friend to get a ride home.  We have set up a safety plan through [facility] and given him [phone] save his number.  Care of the patient has been 

transferred to Dr. Merrill at this time


Patient mated that he was out of his hotel room because he does not have his 

disability check yet.  He asked if we could give him some money for hotel room 

until he gets his disability check.  At this time we encouraged him to go to 

[facility] if he was homeless and he declined again.





Departure


Impression





   Primary Impression:  


   Passive suicidal ideations


Disposition:  01 HOME, SELF-CARE


Condition:  Stable





Departure-Patient Inst.


Decision time for Depature:  18:28


Referrals:  


[facility] (PCP/Family)


Primary Care Physician


Patient Instructions:  OUTPT MENTAL HEALTH SERVICES, Suicide Prevention





Add. Discharge Instructions:  


[address] OBRIENJOLIE                 Jun 29, 2021 09:07

## 2021-10-27 ENCOUNTER — HOSPITAL ENCOUNTER (EMERGENCY)
Dept: HOSPITAL 75 - ER | Age: 66
Discharge: HOME | End: 2021-10-27
Payer: MEDICARE

## 2021-10-27 VITALS — HEIGHT: 64.96 IN | WEIGHT: 149.91 LBS | BODY MASS INDEX: 24.98 KG/M2

## 2021-10-27 VITALS — SYSTOLIC BLOOD PRESSURE: 119 MMHG | DIASTOLIC BLOOD PRESSURE: 97 MMHG

## 2021-10-27 DIAGNOSIS — F20.9: ICD-10-CM

## 2021-10-27 DIAGNOSIS — F22: Primary | ICD-10-CM

## 2021-10-27 DIAGNOSIS — Z79.899: ICD-10-CM

## 2021-10-27 DIAGNOSIS — F31.9: ICD-10-CM

## 2021-10-27 NOTE — XMS REPORT
Encounter Summary

                             Created on: 10/27/2021



Gaurang Del Angel

External Reference #: [id number]

: 1955

Sex: Male



Demographics





                          Address                   [place]OMELESS

[place]  [address]

 

                          Home Phone                [phone]

 

                          Preferred Language        English

 

                          Marital Status            

 

                          Jain Affiliation     [id number]

 

                          Race                      White

 

                          Ethnic Group              Not  or 





Author





                          Author                    [facility]

 

                          Address                   Unknown

 

                          Phone                     Unavailable







Support





                Name            Relationship    Address         Phone

 

                    Rosa Cruz     ECON                UNKNOWN

[place]  [id number]                       [phone]







Care Team Providers





                    Care Team Member Name Role                Phone

 

                    Unassigned, None    PCP                 Unavailable







Encounter Details





                          Care Team                 Description



                     Date                Type                Department  

 

                                        



[facility]                             



                     2021          [facility]

angélica  



                           Scan                      [address]  



                                         [id number]  



                                         [place] [address]  



                                         [phone]  







Social History





                                        Date



                 Tobacco Use     Types           Packs/Day       Years Used 

 

                                         



                           Current Every Day Smoker   0  

 

    



                                         Smokeless Tobacco: Never   



                                         Used   







                                        Comments



                           Alcohol Use               Standard Drinks/Week 

 

                                         



                           Not Currently             0 (1 standard drink = 0.6 o

z pure alcohol) 







                    Birth Control       Partners            Comments



                                         Sexually Active   

 

                                                             



                                         Not Currently   







 



                           Sex Assigned at Birth     Date Recorded

 

 



                           Male                      10/20/2019  2:19 PM CDT



documented as of this encounter



Plan of Treatment





Not on filedocumented as of this encounter



Visit Diagnoses

Not on filedocumented in this encounter



Additional Health Concerns





                                        Noted Time



                                         Assessment 

 

                                        10/28/2019  8:00 AM CDT



                                         A fall risk assessment has been complet

ed for the 



                                         patient 



documented as of this encounter



Care Teams





                          Start Date                End Date



                     Team Member         Relationship        Specialty  

 

                          10/20/19                   



                           Unassigned, None          PCP - General   



                                         [place]    



documented as of this encounter

## 2021-10-27 NOTE — XMS REPORT
Clinical Summary

                             Created on: 10/27/2021



Gaurang Del Angel

External Reference #: [id number]

: 1955

Sex: Male



Demographics





                          Address                   HOMELESS

[place]  [id number]

 

                          Home Phone                [phone]

 

                          Preferred Language        English

 

                          Marital Status            

 

                          Faith Affiliation     [id number]

 

                          Race                      White

 

                          Ethnic Group              Not  or 





Author





                          Author                    [facility]

 

                          Address                   Unknown

 

                          Phone                     Unavailable







Support





                Name            Relationship    Address         Phone

 

                    Rosa Cruz     ECON                UNKNOWN

[place]  [id number]                       [phone]







Care Team Providers





                    Care Team Member Name Role                Phone

 

                    Unassigned, None    PCP                 Unavailable







Allergies





                                        Comments



                 Active Allergy  Reactions       Severity        Noted Date 

 

                                        



Sneezing



                     Pollen Extract      Other (See          10/26/2019 



                                         Comments)   







Medications





                          End Date                  Status



              Medication   Sig          Dispensed    Refills      Start  



                                         Date  

 

                                                    Active



              lamoTRIgine (LAMICTAL) 25  Take 1 tab   60 tablet    0            

10/28/201  



                     MG tabletIndications:  daily for 1         9  



                           Bipolar I disorder,       week then     



                           current or most recent    take  tablets     



                           episode manic, with       daily     



                                         psychotic features (HCC)      

 

                                                    Active



              buPROPion (WELLBUTRIN XL)  Take 1 tablet  30 tablet    0          

  10/29/201  



                     300 MG 24 hr        (300 mg             9  



                           tabletIndications:        total) by     



                           Bipolar I disorder,       mouth daily.     



                                         current or most recent      



                                         episode manic, with      



                                         psychotic features (HCC)      

 

                                                    Active



              traZODone (DESYREL) 100  Take 2       60 tablet    0            10

/28/201  



                     MG tabletIndications:  tablets (200        9  



                           Insomnia                  mg total) by     



                                         mouth at     



                                         bedtime as     



                                         needed for     



                                         Sleep.     



                                         Indications:     



                                         Trouble     



                                         Sleeping     

 

                                                    Active



              OLANZapine (ZYPREXA) 10  Take 1 tablet  30 tablet    0            

10/28/201  



                     MG tabletIndications:  (10 mg total)       9  



                           Bipolar Mood Disorder     by mouth at     



                                         bedtime.     



                                         Indications:     



                                         Manic-Depress     



                                         ion     







Active Problems





 



                           Problem                   Noted Date

 

 



                           JAIME (generalized anxiety disorder)  10/21/2019

 

 



                           PTSD (post-traumatic stress disorder)  10/21/2019

 

 



                           Bipolar I disorder, current or most recent episode ma

merlene, with psychotic  

10/20/2019



                                         features 







Resolved Problems





  



                     Problem             Noted Date          Resolved Date

 

  



                     Suicide ideation    10/20/2019          10/28/2019







Encounters





                          Care Team                 Description



                     Date                Type                Specialty  

 

                                        



Link, Onbase                             



                           2021                [facility]   



                                         Scan   



from Last 3 Months



Immunizations





  



                     Name                Administration Dates  Next Due

 

  



                           INFLUENZA A&B TRIVALENT   10/21/2019 



                                         VAC ADJUVANTED PF  



                                         (Adults=>65 y/o-FLUAD)  

 

  



                           Pneumococcal Conjugate    10/28/2019 



                                         (13-valent)  







Family History





   



                 Medical History  Relation        Name            Comments

 

   



                           Alcohol abuse             Father  

 

   



                           Mental illness            Father  







   



                 Relation        Name            Status          Comments

 

   



                                         Father   







Social History





                                        Date



                 Tobacco Use     Types           Packs/Day       Years Used 

 

                                         



                           Current Every Day Smoker   0  

 

    



                                         Smokeless Tobacco: Never   



                                         Used   







                                        Comments



                           Alcohol Use               Standard Drinks/Week 

 

                                         



                           Not Currently             0 (1 standard drink = 0.6 o

z pure alcohol) 







                    Birth Control       Partners            Comments



                                         Sexually Active   

 

                                                             



                                         Not Currently   







 



                           Sex Assigned at Birth     Date Recorded

 

 



                           Male                      10/20/2019  2:19 PM CDT







Last Filed Vital Signs





                    Reading             Time Taken          Comments



                                         Vital Sign   

 

                    96/58               10/28/2019  5:41 AM CDT  



                                         Blood Pressure   

 

                    81                  10/28/2019  5:41 AM CDT  



                                         Pulse   

 

                    36.8 C (98.3 F) 10/28/2019  5:40 AM CDT  



                                         Temperature   

 

                    16                  10/28/2019  5:41 AM CDT  



                                         Respiratory Rate   

 

                    96%                 10/28/2019  5:40 AM CDT  



                                         Oxygen Saturation   

 

                    -                   -                    



                                         Inhaled Oxygen   



                                         Concentration   

 

                    65.8 kg (145 lb)    2021  5:06 PM CDT  



                                         Weight   

 

                    177.8 cm (5' 10")   2021  5:06 PM CDT  



                                         Height   

 

                    20.81               2021  5:06 PM CDT  



                                         Body Mass Index   







Plan of Treatment





   



                 Health Maintenance  Due Date        Last Done       Comments

 

   



                           Annual Wellness Visit     1972  

 

   



                           Hepatitis C Screening     1972  

 

   



                           DTaP,Tdap,and Td Vaccines  1973  



                                         (1 - Tdap)   

 

   



                           Colon Cancer Screening    2004  

 

   



                           Zoster Vaccine (1 of 2)   2004  

 

   



                     Pneumo-Vaccine: 65+Yrs (1  2019          10/28/2019 



                                         of 2 - PPSV23)   

 

   



                     Pneumo-Vaccine: Peds (0-5  2019          10/28/2019 



                                         Yrs) & At-Risk Patients   



                                         (6-64 Yrs) (1 of 2 -   



                                         PPSV23)   

 

   



                     Influenza Vaccine (#1)  2021          10/21/2019 

 

   



                     COVID-19 Vaccine    Completed           2021, 



                                         2021 

 

   



                     HIB Vaccines        Aged Out            No longer eligible 

based on patient's age to



                                         complete this topic

 

   



                     IPV Vaccines        Aged Out            No longer eligible 

based on patient's age to



                                         complete this topic

 

   



                     Meningococcal Vaccine  Aged Out            No longer eligib

le based on patient's age to



                                         complete this topic

 

   



                     Rotavirus Vaccines  Aged Out            No longer eligible 

based on patient's age to



                                         complete this topic







Results

Not on filefrom Last 3 Months



Insurance





                                        Type



            Payer      Benefit    Subscriber ID  Effective  Phone      Address 



                           Plan /                    Dates   



                                         Group     

 

                                        Medicare



            [employer] [facility]  MEDICARE   [id number]  2019-P  [phone]

-0060  [address] 

[address] 



                     [facility]                 resent              [address] 



                                         [place] 



                                         [address] 







Advance Directives





For more information, please contact: [phone]







                          Patient Representative    Explanation



                           Type                      Date Recorded  

 

                                                     



                                         Advance Directives   



                                         and Living Will   

 

                                                     



                                         Power of    











                          Date Inactivated          Comments



                           Code Status               Date Activated  

 

                                                     



                           Full Code                 10/28/2019 10:09 AM  







                                                     

 

                          10/28/2019 10:09 AM        



                           Full Code                 10/20/2019  3:14 PM  







Care Teams





                          Start Date                End Date



                     Team Member         Relationship        Specialty  

 

                          10/20/19                   



                           Unassigned, None          PCP - General   



                                         [place]

## 2021-10-27 NOTE — ED PSYCHOSOCIAL
General


Stated Complaint:  SUICIDAL


Source:  patient


Exam Limitations:  no limitations


 (PARKER BELLAMY)





History of Present Illness


Date Seen by Provider:  Oct 27, 2021


Time Seen by Provider:  12:17


Initial Comments


To ER by EMS from [place] with reports of thoughts of suicide.  Has had this many

times before.  He is never attempted suicide.  He states that he is paranoid and

needs inpatient help.


Timing/Duration:  constant


Severity:  moderate


 (PARKER BELLAMY)





Allergies and Home Medications


Allergies


Coded Allergies:  


     No Known Drug Allergies (Unverified , 8/25/19)





Patient Home Medication List


Home Medication List Reviewed:  Yes


 (PARKER BELLAMY)


Lithium Carbonate (Lithium Carbonate) 300 Mg Capsule, 300 MG PO BID, (Reported)


   Entered as Reported by: ALLIE GREEN on 4/29/21 1051


Olanzapine (Zyprexa) 5 Mg Tablet, 5 MG PO DAILY


   Prescribed by: PARKER BELLAMY on 10/27/21 1242





Review of Systems


Constitutional:  see HPI


EENTM:  see HPI


Respiratory:  no symptoms reported


Cardiovascular:  no symptoms reported


Genitourinary:  no symptoms reported


Musculoskeletal:  no symptoms reported


Skin:  no symptoms reported


Psychiatric/Neurological:  See HPI (PARKER BELLAMY)





Past Medical-Social-Family Hx


Immunizations Up To Date


Tetanus Booster (TDap):  Unknown


 (PARKER BELLAMY)





Seasonal Allergies


Seasonal Allergies:  Yes


 (PARKER BELLAMY)





Past Medical History


Surgeries:  Yes (HERNIA REPAIR, prostate removed)


Abdominal, Transurethral Resection


Respiratory:  No


Cardiac:  No


Neurological:  No


Genitourinary:  Yes (S/P TURP)


Benign Prostatic Hyperpl, Prostate Problems


Gastrointestinal:  Yes (LEFT INGUINAL HERNIA)


Abdominal Hernia


Musculoskeletal:  No


Endocrine:  No


HEENT:  No


Cancer:  No


Psychosocial:  Yes ("BIPOLAR TYPE 2-SCHIZOID NO DILUSIONS";SUICIDAL 

IDEATIONS;POLYSUBSTANCE ABU)


Sleep Difficulties, Anxiety, Suicide Attempts, Bipolar, Schizophrenia, 

Depression


Integumentary:  No


Blood Disorders:  No


 (PARKER BELLAMY)





Family Medical History


No Pertinent Family Hx





SOCIAL HISTORY:


-ETOH--2-3 MIXED DRINKS A DAY. NONE FOR 2-3 MONTHS, PER PT ON 05/14/21


-DRUGS--METHAMPHETAMINES, BLACK TAR HEROIN, THC. DENIES IV USE. CLAIMS NO


USE FOR 2 WEEKS, PER PT ON 05/14/21


-SMOKED 1 PPD, STATES HE QUIT 2-3 MONTHS AGO, PER PT ON 05/14/21








ADDITIONAL HISTORY: 


-MULTIPLE PSYCH ADMITS, INCLUDING [facility]


ALSO [facility], [facility], [facility],


[facility], OTHERS.





LONG HISTORY OF HOMELESSNESS, LIVING IN LOCAL HOTELS AT INTERVALS


 (PARKER BELLAMY)





Physical Exam





Vital Signs - First Documented








 10/27/21





 12:13


 


Temp 36.0


 


Pulse 100


 


Resp 18


 


B/P (MAP) 119/97 (104)








 (KIMBERLY MATHIS DO)


Capillary Refill :  


 (PARKER BELLAMY)


Height, Weight, BMI


Height: 5'10.00"


Weight: 150lbs. oz. 68.365055qq; 20.00 BMI


Method:Stated


General Appearance:  WD/WN, no apparent distress, other (He alert, cooperative, 

he is known to be very manipulative.  He has the same story every time he comes 

in which is to state that he is recently been evicted from the hotel that he is 

staying in, has been off of his medications, plans to overdose.  He will often 

openly admit that he is making the statements only to have a place to stay.)


Neck:  non-tender, full range of motion


Respiratory:  no respiratory distress, no accessory muscle use


Cardiovascular:  regular rate, rhythm, no murmur


Gastrointestinal:  normal bowel sounds, non tender


Extremities:  normal range of motion, non-tender


Neurologic/Psychiatric:  alert, normal mood/affect, oriented x 3


Appearance/Memory:  appropriate appearance, appropriate insight


Thoughts/Hallucinations:  normal thought pattern, no apparent hallucination


Skin:  normal color, warm/dry (PARKER BELLAMY)





Departure


Communication (Admissions)


Spoke with Tye landry from [facility] and I recommended to him

outpatient services.  We gave the patient Zyprexa here which she was reluctant t

o take it first.  It is unclear to me why he would want to go inpatient if he 

does not want the medications offered here for his symptoms.  Ultimately he 

conceded and took the medication.  I will provide him with [facility] phone number and have him call them for follow-up outpatient.


 (PARKER BELLAMY)





Impression





   Primary Impression:  


   Paranoia


Disposition:  01 HOME, SELF-CARE


Condition:  Critical





Departure-Patient Inst.


Decision time for Depature:  12:19


 (PARKER BELLAMY)


Referrals:  


[facility] (PCP/Family)


Primary Care Physician


Patient Instructions:  NO INSTRUCTIONS GIVEN





Add. Discharge Instructions:  


1.  Called [facility] at [phone] for follow-up.  I have sent 

medication for you to [facility] pharmacy across the street..


Scripts


Olanzapine (Zyprexa) 5 Mg Tablet


5 MG PO DAILY, #10 TAB


   Prov: PARKER BELLAMY         10/27/21











ATTENDING PHYSICIAN NOTE:


I WAS PHYSICALLY PRESENT AS ER PHYSICIAN WHILE THIS PT WAS IN ER, BUT I WAS NOT 

INVOLVED IN ANY DECISION MAKING OR ANY CARE OF THIS PATIENT. 


 (KIMBERLY MATHIS DO)











PARKER BELLAMY             Oct 27, 2021 12:20


KIMBERLY MATHIS DO                 Oct 29, 2021 21:38

## 2021-10-30 ENCOUNTER — HOSPITAL ENCOUNTER (EMERGENCY)
Dept: HOSPITAL 75 - ER | Age: 66
LOS: 1 days | Discharge: HOME | End: 2021-10-31
Payer: MEDICARE

## 2021-10-30 VITALS — WEIGHT: 147.71 LBS | HEIGHT: 70 IN | BODY MASS INDEX: 21.15 KG/M2

## 2021-10-30 DIAGNOSIS — F31.9: ICD-10-CM

## 2021-10-30 DIAGNOSIS — F20.9: ICD-10-CM

## 2021-10-30 DIAGNOSIS — Z59.00: ICD-10-CM

## 2021-10-30 DIAGNOSIS — Z20.822: ICD-10-CM

## 2021-10-30 DIAGNOSIS — Z79.899: ICD-10-CM

## 2021-10-30 DIAGNOSIS — R45.851: Primary | ICD-10-CM

## 2021-10-30 PROCEDURE — 36415 COLL VENOUS BLD VENIPUNCTURE: CPT

## 2021-10-30 PROCEDURE — 80306 DRUG TEST PRSMV INSTRMNT: CPT

## 2021-10-30 PROCEDURE — 81000 URINALYSIS NONAUTO W/SCOPE: CPT

## 2021-10-30 PROCEDURE — 87636 SARSCOV2 & INF A&B AMP PRB: CPT

## 2021-10-30 PROCEDURE — 80329 ANALGESICS NON-OPIOID 1 OR 2: CPT

## 2021-10-30 PROCEDURE — 99283 EMERGENCY DEPT VISIT LOW MDM: CPT

## 2021-10-30 PROCEDURE — 93005 ELECTROCARDIOGRAM TRACING: CPT

## 2021-10-30 PROCEDURE — 80053 COMPREHEN METABOLIC PANEL: CPT

## 2021-10-30 PROCEDURE — 85025 COMPLETE CBC W/AUTO DIFF WBC: CPT

## 2021-10-30 PROCEDURE — 80320 DRUG SCREEN QUANTALCOHOLS: CPT

## 2021-10-30 PROCEDURE — 84443 ASSAY THYROID STIM HORMONE: CPT

## 2021-10-30 SDOH — ECONOMIC STABILITY - HOUSING INSECURITY: HOMELESSNESS UNSPECIFIED: Z59.00

## 2021-10-31 ENCOUNTER — HOSPITAL ENCOUNTER (EMERGENCY)
Dept: HOSPITAL 75 - ER | Age: 66
Discharge: HOME | End: 2021-10-31
Payer: MEDICARE

## 2021-10-31 VITALS — SYSTOLIC BLOOD PRESSURE: 155 MMHG | DIASTOLIC BLOOD PRESSURE: 88 MMHG

## 2021-10-31 VITALS — SYSTOLIC BLOOD PRESSURE: 132 MMHG | DIASTOLIC BLOOD PRESSURE: 88 MMHG

## 2021-10-31 VITALS — BODY MASS INDEX: 20.45 KG/M2 | WEIGHT: 142.86 LBS | HEIGHT: 70 IN

## 2021-10-31 VITALS — SYSTOLIC BLOOD PRESSURE: 129 MMHG | DIASTOLIC BLOOD PRESSURE: 93 MMHG

## 2021-10-31 VITALS — HEIGHT: 70 IN | WEIGHT: 140.21 LBS | BODY MASS INDEX: 20.07 KG/M2

## 2021-10-31 DIAGNOSIS — F20.9: ICD-10-CM

## 2021-10-31 DIAGNOSIS — M19.041: Primary | ICD-10-CM

## 2021-10-31 DIAGNOSIS — F31.9: ICD-10-CM

## 2021-10-31 DIAGNOSIS — T69.9XXA: Primary | ICD-10-CM

## 2021-10-31 DIAGNOSIS — Z79.899: ICD-10-CM

## 2021-10-31 DIAGNOSIS — R09.81: ICD-10-CM

## 2021-10-31 LAB
ALBUMIN SERPL-MCNC: 4 GM/DL (ref 3.2–4.5)
ALP SERPL-CCNC: 91 U/L (ref 40–136)
ALT SERPL-CCNC: 14 U/L (ref 0–55)
APAP SERPL-MCNC: < 10 UG/ML (ref 10–30)
APTT PPP: YELLOW S
BACTERIA #/AREA URNS HPF: NEGATIVE /HPF
BARBITURATES UR QL: NEGATIVE
BASOPHILS # BLD AUTO: 0.1 10^3/UL (ref 0–0.1)
BASOPHILS NFR BLD AUTO: 1 % (ref 0–10)
BENZODIAZ UR QL SCN: NEGATIVE
BILIRUB SERPL-MCNC: 1 MG/DL (ref 0.1–1)
BILIRUB UR QL STRIP: NEGATIVE
BUN/CREAT SERPL: 17
CALCIUM SERPL-MCNC: 8.8 MG/DL (ref 8.5–10.1)
CHLORIDE SERPL-SCNC: 105 MMOL/L (ref 98–107)
CO2 SERPL-SCNC: 23 MMOL/L (ref 21–32)
COCAINE UR QL: NEGATIVE
CREAT SERPL-MCNC: 0.82 MG/DL (ref 0.6–1.3)
EOSINOPHIL # BLD AUTO: 0.2 10^3/UL (ref 0–0.3)
EOSINOPHIL NFR BLD AUTO: 3 % (ref 0–10)
FIBRINOGEN PPP-MCNC: CLEAR MG/DL
GFR SERPLBLD BASED ON 1.73 SQ M-ARVRAT: 94 ML/MIN
GLUCOSE SERPL-MCNC: 100 MG/DL (ref 70–105)
GLUCOSE UR STRIP-MCNC: NEGATIVE MG/DL
HCT VFR BLD CALC: 40 % (ref 40–54)
HGB BLD-MCNC: 13.7 G/DL (ref 13.3–17.7)
KETONES UR QL STRIP: (no result)
LEUKOCYTE ESTERASE UR QL STRIP: NEGATIVE
LYMPHOCYTES # BLD AUTO: 2.3 10^3/UL (ref 1–4)
LYMPHOCYTES NFR BLD AUTO: 35 % (ref 12–44)
MANUAL DIFFERENTIAL PERFORMED BLD QL: NO
MCH RBC QN AUTO: 31 PG (ref 25–34)
MCHC RBC AUTO-ENTMCNC: 34 G/DL (ref 32–36)
MCV RBC AUTO: 91 FL (ref 80–99)
METHADONE UR QL SCN: NEGATIVE
METHAMPHETAMINE SCREEN URINE S: NEGATIVE
MONOCYTES # BLD AUTO: 0.8 10^3/UL (ref 0–1)
MONOCYTES NFR BLD AUTO: 12 % (ref 0–12)
NEUTROPHILS # BLD AUTO: 3.2 10^3/UL (ref 1.8–7.8)
NEUTROPHILS NFR BLD AUTO: 49 % (ref 42–75)
NITRITE UR QL STRIP: NEGATIVE
OPIATES UR QL SCN: NEGATIVE
OTHER ELEMENTS URNS MICRO: (no result) /HPF
OXYCODONE UR QL: NEGATIVE
PH UR STRIP: 6 [PH] (ref 5–9)
PLATELET # BLD: 259 10^3/UL (ref 130–400)
PMV BLD AUTO: 10.6 FL (ref 9–12.2)
POTASSIUM SERPL-SCNC: 4.2 MMOL/L (ref 3.6–5)
PROPOXYPH UR QL: NEGATIVE
PROT SERPL-MCNC: 6.4 GM/DL (ref 6.4–8.2)
PROT UR QL STRIP: NEGATIVE
RBC #/AREA URNS HPF: (no result) /HPF
SALICYLATES SERPL-MCNC: < 5 MG/DL (ref 5–20)
SODIUM SERPL-SCNC: 140 MMOL/L (ref 135–145)
SP GR UR STRIP: 1.02 (ref 1.02–1.02)
SQUAMOUS #/AREA URNS HPF: (no result) /HPF
TRICYCLICS UR QL SCN: NEGATIVE
TSH SERPL DL<=0.05 MIU/L-ACNC: 0.68 UIU/ML (ref 0.35–4.94)
WBC # BLD AUTO: 6.5 10^3/UL (ref 4.3–11)
WBC #/AREA URNS HPF: (no result) /HPF

## 2021-10-31 PROCEDURE — 99284 EMERGENCY DEPT VISIT MOD MDM: CPT

## 2021-10-31 PROCEDURE — 99281 EMR DPT VST MAYX REQ PHY/QHP: CPT

## 2021-10-31 NOTE — ED PSYCHOSOCIAL
General


Stated Complaint:  SUICIDAL IDEATIONS


Source:  patient, old records





History of Present Illness


Date Seen by Provider:  Oct 31, 2021


Time Seen by Provider:  00:03


Initial Comments


PT ARRIVES BY WALKING, CARRYING BACK PACK AND BAGS WITH HIM


PT STATES "I'VE HAD SOME STRESS AND SUICIDAL IDEATIONS" 


STATES HIS PLAN IS "POSSIBLE DRUG OVERDOSE" --STATES FENTANYL "OFF THE STREET"


PT HAS NOT MADE ANY ATTEMPT TO OVERDOSE OR TO OBTAIN ANY FENTANYL 


STATES HE HAS BEEN FEELING LIKE THIS "FOR 2 WEEKS"





PT HAS LONGSTANDING HISTORY OF THIS COMPLAINT--ALTHOUGH PT HAS NEVER ACTUALLY 

ATTEMPTED SUICIDE


PT BRINGS MULTIPLE BOTTLES OF MEDICATIONS --ALL PRESCRIBED IN APRIL OF THIS 

YEAR,  AND ALL APPROXIMATELY HALF FULL


PT STATES HE HAS NOT BEEN TAKING ANY MEDICATIONS, DESPITE PT'S STATEMENT OF 

"DRUG OVERDOSE" HE OBVIOUSLY IS NOT TAKING HIS PRESCRIBED MEDICATIONS, AND HAS 

AN ABUNDANCE OF PILLS WHICH HE COULD OVERDOSE WITH, AND YET HE HAS NEVER 

ACTUALLY OVERDOSED ON ANYTHING


PT HAS THE FOLLOWING PILLS WITH HIM:


-BENZTROPINE 0.5 MG #30 PRESCRIBED 04/14/21 BY MARKOS NEELY, #23 LEFT IN BOTTLE


-LITHIUM 300 MG #60 PRESCRIBED ON 04/14/21 BY MARKOS NEELY, #51 LEFT IN BOTTLE


-LITHIUM 300 MG #60 PRESCRIBED 04/29/21 BY DR. BURGESS, #37 LEFT IN BOTTLE


-CITALOPRAM 20 MG #30 PRESCRIBED 04/29/21 BY DR. BURGESS, #19 LEFT IN BOTTLE


-LITHIUM 300 MG #30 PRESCRIBED 07/23/21 BY DR. BALBUENA, #25 LEFT IN BOTTLE


-ALSO A BOTTLE OF 2 UNIDENTIFIED PILLS--LABEL HAS BEEN SCRAPED OFF OVER NAME OF 

PT , NAME OF MEDICATION/DATE, AND NAME OF PROVIDER 





PT WITH MULTITUDE OF VISITS HERE FOR SAME


PT WITH LONG HISTORY OF EXTREMELY MANIPULATIVE BEHAVIOR


PT WITH LONGSTANDING HOMELESSNESS, AND LIVES AT VARIOUS LOCAL HOTELS AT 

INTERVALS, AND TYPICALLY GETS EVICTED FROM THEM WHEN HE RUNS OUT OF 

MONEY--USUALLY TOWARDS THE END OF THE MONTH. HIS TYPICAL BEHAVIOR IS THEN TO 

PRESENT TO ER REPORTING SUICIDAL IDEATIONS


PT OPENLY ADMITS THAT HE IS HERE TO HAVE A PLACE TO STAY


PT STATES HE WAS EVICTED FROM LOCAL HOTEL "3-4 DAYS AGO" AND HAS BEEN THE STREET

SINCE THEN. 


PT STATES HE WAS AT A "FRIEND'S HOUSE" EARLIER TODAY, BUT GIVES NO REASON WHY HE

IS NOT STAYING THERE, ONLY STATES "IT WOULD BE AWKWARD" 


PT HAS REPEATEDLY BEEN OFFERED A RIDE TO [employer] [facility] IN [facility] [place] [facility] [place], AND HE HAS REPEATEDLY REFUSED THIS OPTION. 





LAST VISIT HERE  WAS ON 10/27/21 FOR EXACT SAME COMPLAINT


[facility]LV[facility] WAS CONTACTED AT THAT TIME AND OUTPATIENT SERVICES WERE ARRANGED, AND 

PT WAS TO FOLLOW UP WITH THEM


PT WAS GIVEN RX FOR ZYPREXA, WHICH PT STATES HE DID NOT 


PT ADMITS THAT HE HAS NOT FOLLOWED UP WITH ANYONE AND HAS NOT ATTEMPTED TO MAKE 

A FOLLOW UP APPOINTMENT WITH ANYONE. 


PT HAS NOT ATTEMPTED TO CONTACT [phone] AT ANY TIME, AND THIS NUMBER HAS BEEN 

GIVEN TO PT AT EVERY VISIT





PT HAS REPEATEDLY MADE NO EFFORT WHATSOEVER TO TAKE PRESCRIBED MEDICATIONS, OR 

TO FOLLOW UP WITH ANYONE AT ANY TIME





PCP: JANA


PSYCH: [facility]





Allergies and Home Medications


Allergies


Coded Allergies:  


     No Known Drug Allergies (Unverified , 8/25/19)





Patient Home Medication List


Home Medication List Reviewed:  Yes


Lithium Carbonate (Lithium Carbonate) 300 Mg Capsule, 300 MG PO BID, (Reported)


   Entered as Reported by: ALLIE GREEN on 4/29/21 1051


Olanzapine (Zyprexa) 5 Mg Tablet, 5 MG PO DAILY


   Prescribed by: PARKER BELLAMY on 10/27/21 1242





Review of Systems


Constitutional:  no symptoms reported


Respiratory:  no symptoms reported


Cardiovascular:  no symptoms reported


Gastrointestinal:  no symptoms reported


Genitourinary:  no symptoms reported


Musculoskeletal:  no symptoms reported


Psychiatric/Neurological:  See HPI





Past Medical-Social-Family Hx


Immunizations Up To Date


Tetanus Booster (TDap):  Unknown


First/Initial COVID19 Vaccinat:  SEPT


Second COVID19 Vaccination [place]:  SEPT





Seasonal Allergies


Seasonal Allergies:  Yes





Past Medical History


Surgeries:  Yes (HERNIA REPAIR,)


Abdominal, Transurethral Resection


Respiratory:  No


Cardiac:  No


Neurological:  No


Genitourinary:  Yes (S/P TURP)


Benign Prostatic Hyperpl, Prostate Problems


Gastrointestinal:  Yes (LEFT INGUINAL HERNIA)


Abdominal Hernia


Musculoskeletal:  No


Endocrine:  No


HEENT:  No


Cancer:  No


Psychosocial:  Yes ("BIPOLAR TYPE 2-SCHIZOID NO DILUSIONS";SUICIDAL 

IDEATIONS;POLYSUBSTANCE ABU)


Sleep Difficulties, Anxiety, Suicide Attempts, Bipolar, Schizophrenia, De

pression


Integumentary:  No


Blood Disorders:  No





Family Medical History


No Pertinent Family Hx





SOCIAL HISTORY:


-ETOH--2-3 MIXED DRINKS A DAY. NONE FOR 2-3 MONTHS, PER PT ON 05/14/21


-DRUGS--METHAMPHETAMINES, BLACK TAR HEROIN, THC. DENIES IV USE. CLAIMS NO


USE FOR 2 WEEKS, PER PT ON 05/14/21


-SMOKED 1 PPD, STATES HE QUIT 2-3 MONTHS AGO, PER PT ON 05/14/21








ADDITIONAL HISTORY: 


-MULTIPLE PSYCH ADMITS, INCLUDING [place]


ALSO [facility], [facility], [facility],


[place] [facility], OTHERS.





LONG HISTORY OF HOMELESSNESS, LIVING IN LOCAL HOTELS AT INTERVALS


LONG HISTORY OF VERY MANIPULATIVE BEHAVIOR





Physical Exam





Vital Signs - First Documented








 10/31/21 10/31/21





 00:03 01:45


 


Temp 36.7 


 


Pulse 110 


 


Resp 20 


 


B/P (MAP) 99/ 


 


Pulse Ox  98


 


O2 Delivery  Room Air





Capillary Refill :


Height, Weight, BMI


Height: 5'10.00"


Weight: 150lbs. oz. 68.381841ny; 24.00 BMI


Method:Stated


General Appearance:  WD/WN, no apparent distress, thin, other (FLAT AFFECT)


Respiratory:  normal breath sounds, no respiratory distress, no accessory muscle

use


Cardiovascular:  regular rate, rhythm, no murmur


Gastrointestinal:  soft


Neurologic/Psychiatric:  CNs II-XII nml as tested, no motor/sensory deficits, 

alert, oriented x 3


Appearance/Memory:  disheveled


Behavior/Eye Contact:  cooperative, normal speech


Thoughts/Hallucinations:  no apparent hallucination


Skin:  normal color, warm/dry





Progress/Results/Core Measures


Results/Orders


Lab Results





Laboratory Tests








Test


 10/31/21


00:34 10/31/21


00:51 10/31/21


00:57 Range/Units


 


 


White Blood Count


 6.5 


 


 


 4.3-11.0


10^3/uL


 


Red Blood Count


 4.42 


 


 


 4.30-5.52


10^6/uL


 


Hemoglobin 13.7    13.3-17.7  g/dL


 


Hematocrit 40    40-54  %


 


Mean Corpuscular Volume 91    80-99  fL


 


Mean Corpuscular Hemoglobin 31    25-34  pg


 


Mean Corpuscular Hemoglobin


Concent 34 


 


 


 32-36  g/dL





 


Red Cell Distribution Width 13.1    10.0-14.5  %


 


Platelet Count


 259 


 


 


 130-400


10^3/uL


 


Mean Platelet Volume 10.6    9.0-12.2  fL


 


Immature Granulocyte % (Auto) 0     %


 


Neutrophils (%) (Auto) 49    42-75  %


 


Lymphocytes (%) (Auto) 35    12-44  %


 


Monocytes (%) (Auto) 12    0-12  %


 


Eosinophils (%) (Auto) 3    0-10  %


 


Basophils (%) (Auto) 1    0-10  %


 


Neutrophils # (Auto)


 3.2 


 


 


 1.8-7.8


10^3/uL


 


Lymphocytes # (Auto)


 2.3 


 


 


 1.0-4.0


10^3/uL


 


Monocytes # (Auto)


 0.8 


 


 


 0.0-1.0


10^3/uL


 


Eosinophils # (Auto)


 0.2 


 


 


 0.0-0.3


10^3/uL


 


Basophils # (Auto)


 0.1 


 


 


 0.0-0.1


10^3/uL


 


Immature Granulocyte # (Auto)


 0.0 


 


 


 0.0-0.1


10^3/uL


 


Sodium Level 140    135-145  MMOL/L


 


Potassium Level 4.2    3.6-5.0  MMOL/L


 


Chloride Level 105      MMOL/L


 


Carbon Dioxide Level 23    21-32  MMOL/L


 


Anion Gap 12    5-14  MMOL/L


 


Blood Urea Nitrogen 14    7-18  MG/DL


 


Creatinine


 0.82 


 


 


 0.60-1.30


MG/DL


 


Estimat Glomerular Filtration


Rate 94 


 


 


  





 


BUN/Creatinine Ratio 17     


 


Glucose Level 100      MG/DL


 


Calcium Level 8.8    8.5-10.1  MG/DL


 


Corrected Calcium 8.8    8.5-10.1  MG/DL


 


Total Bilirubin 1.0    0.1-1.0  MG/DL


 


Aspartate Amino Transf


(AST/SGOT) 34 


 


 


 5-34  U/L





 


Alanine Aminotransferase


(ALT/SGPT) 14 


 


 


 0-55  U/L





 


Alkaline Phosphatase 91      U/L


 


Total Protein 6.4    6.4-8.2  GM/DL


 


Albumin 4.0    3.2-4.5  GM/DL


 


TSH Cascade Testing


 0.68 


 


 


 0.35-4.94


UIU/ML


 


Salicylates Level < 5.0 L   5.0-20.0  MG/DL


 


Acetaminophen Level < 10 L   10-30  UG/ML


 


Serum Alcohol < 10    <10  MG/DL


 


SARS-CoV-2 RNA (RT-PCR)  Not Detected   Not Detecte  


 


Urine Color   YELLOW   


 


Urine Clarity   CLEAR   


 


Urine pH   6.0  5-9  


 


Urine Specific Gravity   1.025 H 1.016-1.022  


 


Urine Protein   NEGATIVE  NEGATIVE  


 


Urine Glucose (UA)   NEGATIVE  NEGATIVE  


 


Urine Ketones   TRACE H NEGATIVE  


 


Urine Nitrite   NEGATIVE  NEGATIVE  


 


Urine Bilirubin   NEGATIVE  NEGATIVE  


 


Urine Urobilinogen   1.0  < = 1.0  MG/DL


 


Urine Leukocyte Esterase   NEGATIVE  NEGATIVE  


 


Urine RBC (Auto)   NEGATIVE  NEGATIVE  


 


Urine RBC   NONE   /HPF


 


Urine WBC   NONE   /HPF


 


Urine Squamous Epithelial


Cells 


 


 2-5 


  /HPF





 


Urine Crystals   NONE   /LPF


 


Urine Bacteria   NEGATIVE   /HPF


 


Urine Casts   NONE   /LPF


 


Urine Mucus   LARGE H  /LPF


 


Urine Other   FEW SPERM H  /HPF


 


Urine Culture Indicated   NO   


 


Urine Opiates Screen   NEGATIVE  NEGATIVE  


 


Urine Oxycodone Screen   NEGATIVE  NEGATIVE  


 


Urine Methadone Screen   NEGATIVE  NEGATIVE  


 


Urine Propoxyphene Screen   NEGATIVE  NEGATIVE  


 


Urine Barbiturates Screen   NEGATIVE  NEGATIVE  


 


Ur Tricyclic Antidepressants


Screen 


 


 NEGATIVE 


 NEGATIVE  





 


Urine Phencyclidine Screen   NEGATIVE  NEGATIVE  


 


Urine Amphetamines Screen   NEGATIVE  NEGATIVE  


 


Urine Methamphetamines Screen   NEGATIVE  NEGATIVE  


 


Urine Benzodiazepines Screen   NEGATIVE  NEGATIVE  


 


Urine Cocaine Screen   NEGATIVE  NEGATIVE  


 


Urine Cannabinoids Screen   NEGATIVE  NEGATIVE  








My Orders





Orders - NAYELI MATHISA K DO


Urinalysis (10/31/21 00:15)


Thyroid Analyzer (10/31/21 00:15)


Drug Screen Stat (Urine) (10/31/21 00:15)


Cbc With Automated Diff (10/31/21 00:15)


Comprehensive Metabolic Panel (10/31/21 00:15)


Alcohol (10/31/21 00:15)


Acetaminophen (10/31/21 00:15)


Salicylate (10/31/21 00:15)


Ekg Tracing (10/31/21 00:15)


Covid 19 Inhouse Test (10/31/21 00:56)





Vital Signs/I&O











 10/31/21 10/31/21





 00:03 01:45


 


Temp 36.7 36.7


 


Pulse 110 99


 


Resp 20 18


 


B/P (MAP) 99/ 155/88


 


Pulse Ox  98


 


O2 Delivery  Room Air











Progress


Progress Note :  


Progress Note


MEDICAL SCREENING EXAM DONE


PT HAS BEEN CLEARED MEDICALLY 





PT VOICED NO COMPLAINTS FOR ENTIRE ER STAY


PT SLEPT/RESTED QUIETLY FOR ENTIRE ER STAY





ADVISED PT THAT I DO NOT BELIEVE HE REQUIRES INPATIENT TREATMENT, AND PT IS 

AGREEABLE TO BEING DISMISSED WITHOUT ANY ARGUMENT AT ALL


PT THEN WENT TO WAITING ROOM TO SLEEP, HE WAS ADVISED THAT HE WAS NOT ALLOWED TO

SLEEP IN THE WAITING ROOM SINCE HE HAD BEEN DISMISSED, AND PT WAS ESCORTED OFF 

PROPERTY BY [place] POLICE.


Initial ECG Impression Date:  Oct 31, 2021


Initial ECG Impression Time:  00:26


Initial ECG Rate:  93


Initial ECG Rhythm:  Normal Sinus





Departure


Impression





   Primary Impression:  


   Homelessness


   Additional Impression:  


   Passive suicidal ideations


Disposition:  01 HOME, SELF-CARE


Condition:  Stable





Departure-Patient Inst.


Referrals:  


[facility] (PCP/Family)


Primary Care Physician


Patient Instructions:  OUTPT MENTAL HEALTH SERVICES, Suicide Prevention





Add. Discharge Instructions:  


FOLLOW UP WITH [facility] AS WELL AS [facility] THIS WEEK FOR 

FURTHER CARE--CALL IN THE MORNING TO SCHEDULE APPOINTMENTS FOR MENTAL HEALTH, AS

WELL AS FOR ROUTINE MEDICAL CARE. 


CALL [phone] IF YOU HAVE WORSENING OF YOUR SYMPTOMS











KIMBERLY MATHIS DO                 Oct 31, 2021 00:28

## 2021-10-31 NOTE — ED GENERAL
General


Chief Complaint:  General Problems/Pain


Stated Complaint:  DX W/ HYPOTHERMIA/HANDS SWELLING


Nursing Triage Note:  


AMB  TO ED WITH BACKPACK AND BAG IS HOMELESS STATES HE HAS A RASH AND NEEDS TO 


BE QUARANTINE TO HOSPITAL. NO RASH SEEN ON ARM.


Source of Information:  Patient


Exam Limitations:  No Limitations





History of Present Illness


Date Seen by Provider:  Oct 31, 2021


Time Seen by Provider:  08:43


Initial Comments


Here with report of concerns of rash to his right hand and thought he might need

to be admitted for quarantine.  Ultimately he is homeless and is sleeping in the

cold.  He does not have much winter year yet.  He does know the resources in 

town.  Also reports that he is hungry.  Denies other complaint.


Timing/Duration:  1-3 Hours


Severity:  Mild





Allergies and Home Medications


Allergies


Coded Allergies:  


     No Known Drug Allergies (Unverified , 8/25/19)





Patient Home Medication List


Home Medication List Reviewed:  Yes


Lithium Carbonate (Lithium Carbonate) 300 Mg Capsule, 300 MG PO BID, (Reported)


   Entered as Reported by: ALLIE GREEN on 4/29/21 1051


Olanzapine (Zyprexa) 5 Mg Tablet, 5 MG PO DAILY


   Prescribed by: PARKER BELLAMY on 10/27/21 1242





Review of Systems


Review of Systems


Constitutional:  see HPI; No chills, No fever


Respiratory:  no symptoms reported


Cardiovascular:  no symptoms reported


Skin:  change in color, rash





Past Medical-Social-Family Hx


Patient Social History


Tobacco Use?:  No


Alcohol Use?:  No


Pt feels they are or have been:  Yes





Immunizations Up To Date


Tetanus Booster (TDap):  Unknown


First/Initial COVID19 Vaccinat:  SEPT


Second COVID19 Vaccination [place]:  SEPT





Seasonal Allergies


Seasonal Allergies:  Yes





Past Medical History


Surgeries:  Yes (HERNIA REPAIR,)


Abdominal, Transurethral Resection


Respiratory:  No


Cardiac:  No


Neurological:  No


Genitourinary:  Yes (S/P TURP)


Benign Prostatic Hyperpl, Prostate Problems


Gastrointestinal:  Yes (LEFT INGUINAL HERNIA)


Abdominal Hernia


Musculoskeletal:  No


Endocrine:  No


HEENT:  No


Cancer:  No


Psychosocial:  Yes ("BIPOLAR TYPE 2-SCHIZOID NO DILUSIONS";SUICIDAL 

IDEATIONS;POLYSUBSTANCE ABU)


Sleep Difficulties, Anxiety, Suicide Attempts, Bipolar, Schizophrenia, 

Depression


Integumentary:  No


Blood Disorders:  No





Family Medical History


Reviewed Nursing Family Hx


No Pertinent Family Hx





SOCIAL HISTORY:


-ETOH--2-3 MIXED DRINKS A DAY. NONE FOR 2-3 MONTHS, PER PT ON 05/14/21


-DRUGS--METHAMPHETAMINES, BLACK TAR HEROIN, THC. DENIES IV USE. CLAIMS NO


USE FOR 2 WEEKS, PER PT ON 05/14/21


-SMOKED 1 PPD, STATES HE QUIT 2-3 MONTHS AGO, PER PT ON 05/14/21








ADDITIONAL HISTORY: 


-MULTIPLE PSYCH ADMITS, INCLUDING [place]


ALSO [facility], [facility], [facility],


[place] [facility], OTHERS.





LONG HISTORY OF HOMELESSNESS, LIVING IN LOCAL HOTELS AT INTERVALS


LONG HISTORY OF VERY MANIPULATIVE BEHAVIOR





Physical Exam


Vital Signs





Vital Signs - First Documented








 10/31/21





 08:35


 


Temp 35.9


 


Pulse 100


 


Resp 18


 


B/P (MAP) 129/93 (105)


 


Pulse Ox 98


 


O2 Delivery Room Air





Capillary Refill : Less Than 3 Seconds


Height, Weight, BMI


Height: 5'10.00"


Weight: 150lbs. oz. 68.347157am; 20.00 BMI


Method:Stated


General Appearance:  No Apparent Distress, WD/WN


HEENT:  PERRL/EOMI, Pharynx Normal


Respiratory:  Lungs Clear, Normal Breath Sounds


Cardiovascular:  Regular Rate, Rhythm, No Murmur


Extremity:  Normal Range of Motion, Non Tender


Neurologic/Psychiatric:  Alert, Oriented x3


Skin:  Normal Color, Warm/Dry; No Rash





Progress/Results/Core Measures


Suspected Sepsis


SIRS


Temperature: 


Pulse: 100 


Respiratory Rate: 18


 


Blood Pressure 129 /93 


Mean: 105





Results/Orders


Vital Signs/I&O











 10/31/21





 08:35


 


Temp 35.9


 


Pulse 100


 


Resp 18


 


B/P (MAP) 129/93 (105)


 


Pulse Ox 98


 


O2 Delivery Room Air





Capillary Refill : Less Than 3 Seconds








Blood Pressure Mean:                    105








Progress Note :  


Progress Note


Seen and evaluated.  No obvious rash or significant injury noted.  I do have 

concerns about cold weather capability.  We did give him some new socks as well 

as a blanket and gave him some crackers and peanut butter.  I did discuss with 

him about resources in the community including [place], [facility] and 

[employer].  He will follow-up with them and see if he can get some more 

supplies.  Discharged home with return precautions.  Patient verbalized 

understanding of instructions and agreement with plan.





Departure


Impression





   Primary Impression:  


   Cold exposure


   Qualified Codes:  T69.9XXA - Effect of reduced temperature, unspecified, 

   initial encounter


Disposition:  01 HOME, SELF-CARE


Condition:  Stable





Departure-Patient Inst.


Decision time for Depature:  08:59


Referrals:  


[facility] (PCP/Family)


Primary Care Physician


Patient Instructions:  Hypothermia





Add. Discharge Instructions:  








All discharge instructions reviewed with patient and/or family. Voiced 

understanding.





You need to follow-up with the local resources as discussed for winter weather 

clothing.  You should seek services for housing.  Return for other concerns as 

needed.











TACHO CARPIO MD          Oct 31, 2021 09:00

## 2021-10-31 NOTE — ED GENERAL
General


Stated Complaint:  RASH/ALLERGIES


Source of Information:  Patient


Exam Limitations:  No Limitations





History of Present Illness


Date Seen by Provider:  Oct 31, 2021


Time Seen by Provider:  19:03


Initial Comments


To ER with a variety of complaints including but not limited to swelling to the 

joints in his hand, pain with movement and nasal congestion.


Timing/Duration:  1-2 Days


Severity:  Moderate


Associated Systoms:  Denies Symptoms





Allergies and Home Medications


Allergies


Coded Allergies:  


     No Known Drug Allergies (Unverified , 8/25/19)





Patient Home Medication List


Home Medication List Reviewed:  Yes


Lithium Carbonate (Lithium Carbonate) 300 Mg Capsule, 300 MG PO BID, (Reported)


   Entered as Reported by: ALLIE GREEN on 4/29/21 1051


Olanzapine (Zyprexa) 5 Mg Tablet, 5 MG PO DAILY


   Prescribed by: PARKER BELLAMY on 10/27/21 1242





Review of Systems


Review of Systems


Constitutional:  see HPI


EENTM:  see HPI


Respiratory:  no symptoms reported


Cardiovascular:  no symptoms reported


Genitourinary:  no symptoms reported


Musculoskeletal:  no symptoms reported


Skin:  no symptoms reported


Psychiatric/Neurological:  No Symptoms Reported


Hematologic/Lymphatic:  No Symptoms Reported


Immunological/Allergic:  no symptoms reported





Past Medical-Social-Family Hx


Immunizations Up To Date


Tetanus Booster (TDap):  Unknown


First/Initial COVID19 Vaccinat:  SEPT


Second COVID19 Vaccination [place]:  SEPT





Seasonal Allergies


Seasonal Allergies:  Yes





Past Medical History


Surgeries:  Yes (HERNIA REPAIR,)


Abdominal, Transurethral Resection


Respiratory:  No


Cardiac:  No


Neurological:  No


Genitourinary:  Yes (S/P TURP)


Benign Prostatic Hyperpl, Prostate Problems


Gastrointestinal:  Yes (LEFT INGUINAL HERNIA)


Abdominal Hernia


Musculoskeletal:  No


Endocrine:  No


HEENT:  No


Cancer:  No


Psychosocial:  Yes ("BIPOLAR TYPE 2-SCHIZOID NO DILUSIONS";SUICIDAL IDEATIONS

;POLYSUBSTANCE ABU)


Sleep Difficulties, Anxiety, Suicide Attempts, Bipolar, Schizophrenia, 

Depression


Integumentary:  No


Blood Disorders:  No





Family Medical History


No Pertinent Family Hx





SOCIAL HISTORY:


-ETOH--2-3 MIXED DRINKS A DAY. NONE FOR 2-3 MONTHS, PER PT ON 05/14/21


-DRUGS--METHAMPHETAMINES, BLACK TAR HEROIN, THC. DENIES IV USE. CLAIMS NO


USE FOR 2 WEEKS, PER PT ON 05/14/21


-SMOKED 1 PPD, STATES HE QUIT 2-3 MONTHS AGO, PER PT ON 05/14/21








ADDITIONAL HISTORY: 


-MULTIPLE PSYCH ADMITS, INCLUDING [place]


ALSO [facility], [facility], [facility],


[place] [facility], OTHERS.





LONG HISTORY OF HOMELESSNESS, LIVING IN LOCAL HOTELS AT INTERVALS


LONG HISTORY OF VERY MANIPULATIVE BEHAVIOR





Physical Exam


Vital Signs


Capillary Refill :


Height, Weight, BMI


Height: 5'10.00"


Weight: 150lbs. oz. 68.381948sz; 20.00 BMI


Method:Stated


General Appearance:  No Apparent Distress, WD/WN


Eyes:  Bilateral Eye Normal Inspection, Bilateral Eye PERRL, Bilateral Eye EOMI


Neck:  Full Range of Motion, Normal Inspection


Respiratory:  No Accessory Muscle Use, No Respiratory Distress


Gastrointestinal:  Normal Bowel Sounds, Non Tender, Soft


Extremity:  Normal Capillary Refill, Normal Inspection


Neurologic/Psychiatric:  Alert, Oriented x3


Skin:  Normal Color, Warm/Dry





Progress/Results/Core Measures


Suspected Sepsis


SIRS


Temperature: 


Pulse:  


Respiratory Rate: 


 


Blood Pressure  / 


Mean:





Results/Orders


Vital Signs/I&O


Capillary Refill :





Departure


Impression





   Primary Impression:  


   Osteoarthritis of right hand


   Additional Impression:  


   Nasal congestion


Disposition:  01 HOME, SELF-CARE


Condition:  Stable





Departure-Patient Inst.


Decision time for Depature:  19:04


Referrals:  


[facility] (PCP/Family)


Primary Care Physician


Patient Instructions:  NO INSTRUCTIONS GIVEN











PARKER BELLAMY             Oct 31, 2021 19:04

## 2021-11-28 ENCOUNTER — HOSPITAL ENCOUNTER (EMERGENCY)
Dept: HOSPITAL 75 - ER | Age: 66
Discharge: HOME | End: 2021-11-28
Payer: MEDICARE

## 2021-11-28 VITALS — HEIGHT: 69.69 IN | WEIGHT: 164.91 LBS | BODY MASS INDEX: 23.88 KG/M2

## 2021-11-28 VITALS — SYSTOLIC BLOOD PRESSURE: 140 MMHG | DIASTOLIC BLOOD PRESSURE: 89 MMHG

## 2021-11-28 DIAGNOSIS — F91.9: Primary | ICD-10-CM

## 2021-11-28 DIAGNOSIS — F31.9: ICD-10-CM

## 2021-11-28 DIAGNOSIS — Z72.0: ICD-10-CM

## 2021-11-28 DIAGNOSIS — F20.9: ICD-10-CM

## 2021-11-28 DIAGNOSIS — Z20.822: ICD-10-CM

## 2021-11-28 LAB
ALBUMIN SERPL-MCNC: 3.8 GM/DL (ref 3.2–4.5)
ALP SERPL-CCNC: 81 U/L (ref 40–136)
ALT SERPL-CCNC: 13 U/L (ref 0–55)
APAP SERPL-MCNC: < 10 UG/ML (ref 10–30)
BASOPHILS # BLD AUTO: 0 10^3/UL (ref 0–0.1)
BASOPHILS NFR BLD AUTO: 1 % (ref 0–10)
BILIRUB SERPL-MCNC: 0.8 MG/DL (ref 0.1–1)
BUN/CREAT SERPL: 24
CALCIUM SERPL-MCNC: 9.1 MG/DL (ref 8.5–10.1)
CHLORIDE SERPL-SCNC: 105 MMOL/L (ref 98–107)
CO2 SERPL-SCNC: 19 MMOL/L (ref 21–32)
CREAT SERPL-MCNC: 1.02 MG/DL (ref 0.6–1.3)
EOSINOPHIL # BLD AUTO: 0.1 10^3/UL (ref 0–0.3)
EOSINOPHIL NFR BLD AUTO: 3 % (ref 0–10)
GFR SERPLBLD BASED ON 1.73 SQ M-ARVRAT: 73 ML/MIN
GLUCOSE SERPL-MCNC: 182 MG/DL (ref 70–105)
HCT VFR BLD CALC: 43 % (ref 40–54)
HGB BLD-MCNC: 14.8 G/DL (ref 13.3–17.7)
LYMPHOCYTES # BLD AUTO: 1.5 10^3/UL (ref 1–4)
LYMPHOCYTES NFR BLD AUTO: 30 % (ref 12–44)
MANUAL DIFFERENTIAL PERFORMED BLD QL: NO
MCH RBC QN AUTO: 31 PG (ref 25–34)
MCHC RBC AUTO-ENTMCNC: 35 G/DL (ref 32–36)
MCV RBC AUTO: 89 FL (ref 80–99)
MONOCYTES # BLD AUTO: 0.5 10^3/UL (ref 0–1)
MONOCYTES NFR BLD AUTO: 11 % (ref 0–12)
NEUTROPHILS # BLD AUTO: 2.8 10^3/UL (ref 1.8–7.8)
NEUTROPHILS NFR BLD AUTO: 55 % (ref 42–75)
PLATELET # BLD: 236 10^3/UL (ref 130–400)
PMV BLD AUTO: 11.2 FL (ref 9–12.2)
POTASSIUM SERPL-SCNC: 4 MMOL/L (ref 3.6–5)
PROT SERPL-MCNC: 6.5 GM/DL (ref 6.4–8.2)
SALICYLATES SERPL-MCNC: < 5 MG/DL (ref 5–20)
SODIUM SERPL-SCNC: 141 MMOL/L (ref 135–145)
WBC # BLD AUTO: 5 10^3/UL (ref 4.3–11)

## 2021-11-28 PROCEDURE — 93005 ELECTROCARDIOGRAM TRACING: CPT

## 2021-11-28 PROCEDURE — 87636 SARSCOV2 & INF A&B AMP PRB: CPT

## 2021-11-28 PROCEDURE — 36415 COLL VENOUS BLD VENIPUNCTURE: CPT

## 2021-11-28 PROCEDURE — 99283 EMERGENCY DEPT VISIT LOW MDM: CPT

## 2021-11-28 PROCEDURE — 80053 COMPREHEN METABOLIC PANEL: CPT

## 2021-11-28 PROCEDURE — 85025 COMPLETE CBC W/AUTO DIFF WBC: CPT

## 2021-11-28 PROCEDURE — 80320 DRUG SCREEN QUANTALCOHOLS: CPT

## 2021-11-28 PROCEDURE — 80329 ANALGESICS NON-OPIOID 1 OR 2: CPT
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
## 2021-12-06 ENCOUNTER — HOSPITAL ENCOUNTER (INPATIENT)
Dept: HOSPITAL 75 - ER | Age: 66
LOS: 11 days | Discharge: HOME | DRG: 683 | End: 2021-12-17
Attending: FAMILY MEDICINE | Admitting: FAMILY MEDICINE
Payer: MEDICARE

## 2021-12-06 VITALS — BODY MASS INDEX: 18.1 KG/M2 | HEIGHT: 67.01 IN | WEIGHT: 115.3 LBS

## 2021-12-06 VITALS — SYSTOLIC BLOOD PRESSURE: 137 MMHG | DIASTOLIC BLOOD PRESSURE: 71 MMHG

## 2021-12-06 VITALS — DIASTOLIC BLOOD PRESSURE: 73 MMHG | SYSTOLIC BLOOD PRESSURE: 125 MMHG

## 2021-12-06 DIAGNOSIS — E16.2: ICD-10-CM

## 2021-12-06 DIAGNOSIS — M62.82: ICD-10-CM

## 2021-12-06 DIAGNOSIS — F31.9: ICD-10-CM

## 2021-12-06 DIAGNOSIS — N17.9: Primary | ICD-10-CM

## 2021-12-06 DIAGNOSIS — Z59.01: ICD-10-CM

## 2021-12-06 DIAGNOSIS — Z90.79: ICD-10-CM

## 2021-12-06 DIAGNOSIS — F41.9: ICD-10-CM

## 2021-12-06 DIAGNOSIS — G93.40: ICD-10-CM

## 2021-12-06 DIAGNOSIS — F20.9: ICD-10-CM

## 2021-12-06 DIAGNOSIS — Z87.891: ICD-10-CM

## 2021-12-06 DIAGNOSIS — N40.0: ICD-10-CM

## 2021-12-06 DIAGNOSIS — F19.10: ICD-10-CM

## 2021-12-06 LAB
ALBUMIN SERPL-MCNC: 3.7 GM/DL (ref 3.2–4.5)
ALP SERPL-CCNC: 85 U/L (ref 40–136)
ALT SERPL-CCNC: 36 U/L (ref 0–55)
AMORPH SED URNS QL MICRO: (no result) /LPF
APAP SERPL-MCNC: < 10 UG/ML (ref 10–30)
APTT PPP: YELLOW S
BACTERIA #/AREA URNS HPF: NEGATIVE /HPF
BARBITURATES UR QL: NEGATIVE
BASOPHILS # BLD AUTO: 0 10^3/UL (ref 0–0.1)
BASOPHILS NFR BLD AUTO: 0 % (ref 0–10)
BENZODIAZ UR QL SCN: NEGATIVE
BILIRUB SERPL-MCNC: 0.8 MG/DL (ref 0.1–1)
BILIRUB UR QL STRIP: NEGATIVE
BUN/CREAT SERPL: 36
CALCIUM SERPL-MCNC: 8.8 MG/DL (ref 8.5–10.1)
CHLORIDE SERPL-SCNC: 101 MMOL/L (ref 98–107)
CO2 SERPL-SCNC: 23 MMOL/L (ref 21–32)
COCAINE UR QL: NEGATIVE
CREAT SERPL-MCNC: 2.05 MG/DL (ref 0.6–1.3)
EOSINOPHIL # BLD AUTO: 0 10^3/UL (ref 0–0.3)
EOSINOPHIL NFR BLD AUTO: 0 % (ref 0–10)
FIBRINOGEN PPP-MCNC: (no result) MG/DL
GFR SERPLBLD BASED ON 1.73 SQ M-ARVRAT: 33 ML/MIN
GLUCOSE SERPL-MCNC: 87 MG/DL (ref 70–105)
GLUCOSE UR STRIP-MCNC: (no result) MG/DL
GRAN CASTS #/AREA URNS LPF: (no result) /LPF
HCT VFR BLD CALC: 39 % (ref 40–54)
HGB BLD-MCNC: 13.5 G/DL (ref 13.3–17.7)
HYALINE CASTS #/AREA URNS LPF: (no result) /LPF
KETONES UR QL STRIP: (no result)
LEUKOCYTE ESTERASE UR QL STRIP: NEGATIVE
LYMPHOCYTES # BLD AUTO: 1 10^3/UL (ref 1–4)
LYMPHOCYTES NFR BLD AUTO: 11 % (ref 12–44)
MAGNESIUM SERPL-MCNC: 2.4 MG/DL (ref 1.6–2.4)
MANUAL DIFFERENTIAL PERFORMED BLD QL: NO
MCH RBC QN AUTO: 31 PG (ref 25–34)
MCHC RBC AUTO-ENTMCNC: 35 G/DL (ref 32–36)
MCV RBC AUTO: 89 FL (ref 80–99)
METHADONE UR QL SCN: NEGATIVE
METHAMPHETAMINE SCREEN URINE S: NEGATIVE
MONOCYTES # BLD AUTO: 0.5 10^3/UL (ref 0–1)
MONOCYTES NFR BLD AUTO: 6 % (ref 0–12)
NEUTROPHILS # BLD AUTO: 7.3 10^3/UL (ref 1.8–7.8)
NEUTROPHILS NFR BLD AUTO: 82 % (ref 42–75)
NITRITE UR QL STRIP: NEGATIVE
OPIATES UR QL SCN: NEGATIVE
OXYCODONE UR QL: NEGATIVE
PH UR STRIP: 6 [PH] (ref 5–9)
PLATELET # BLD: 204 10^3/UL (ref 130–400)
PMV BLD AUTO: 10.8 FL (ref 9–12.2)
POTASSIUM SERPL-SCNC: 3.6 MMOL/L (ref 3.6–5)
PROPOXYPH UR QL: NEGATIVE
PROT SERPL-MCNC: 6.3 GM/DL (ref 6.4–8.2)
PROT UR QL STRIP: (no result)
RBC #/AREA URNS HPF: (no result) /HPF
SALICYLATES SERPL-MCNC: < 5 MG/DL (ref 5–20)
SODIUM SERPL-SCNC: 138 MMOL/L (ref 135–145)
SP GR UR STRIP: 1.02 (ref 1.02–1.02)
T4 FREE SERPL-MCNC: 1.17 NG/DL (ref 0.7–1.48)
TRICYCLICS UR QL SCN: NEGATIVE
WBC # BLD AUTO: 8.9 10^3/UL (ref 4.3–11)
WBC #/AREA URNS HPF: (no result) /HPF

## 2021-12-06 PROCEDURE — 80048 BASIC METABOLIC PNL TOTAL CA: CPT

## 2021-12-06 PROCEDURE — 36415 COLL VENOUS BLD VENIPUNCTURE: CPT

## 2021-12-06 PROCEDURE — 93005 ELECTROCARDIOGRAM TRACING: CPT

## 2021-12-06 PROCEDURE — 84443 ASSAY THYROID STIM HORMONE: CPT

## 2021-12-06 PROCEDURE — 80178 ASSAY OF LITHIUM: CPT

## 2021-12-06 PROCEDURE — 82947 ASSAY GLUCOSE BLOOD QUANT: CPT

## 2021-12-06 PROCEDURE — 70450 CT HEAD/BRAIN W/O DYE: CPT

## 2021-12-06 PROCEDURE — 80306 DRUG TEST PRSMV INSTRMNT: CPT

## 2021-12-06 PROCEDURE — 80329 ANALGESICS NON-OPIOID 1 OR 2: CPT

## 2021-12-06 PROCEDURE — 81000 URINALYSIS NONAUTO W/SCOPE: CPT

## 2021-12-06 PROCEDURE — 82550 ASSAY OF CK (CPK): CPT

## 2021-12-06 PROCEDURE — 72125 CT NECK SPINE W/O DYE: CPT

## 2021-12-06 PROCEDURE — 83735 ASSAY OF MAGNESIUM: CPT

## 2021-12-06 PROCEDURE — 85027 COMPLETE CBC AUTOMATED: CPT

## 2021-12-06 PROCEDURE — 82607 VITAMIN B-12: CPT

## 2021-12-06 PROCEDURE — 80320 DRUG SCREEN QUANTALCOHOLS: CPT

## 2021-12-06 PROCEDURE — 82746 ASSAY OF FOLIC ACID SERUM: CPT

## 2021-12-06 PROCEDURE — 84439 ASSAY OF FREE THYROXINE: CPT

## 2021-12-06 PROCEDURE — 80053 COMPREHEN METABOLIC PANEL: CPT

## 2021-12-06 PROCEDURE — 85025 COMPLETE CBC W/AUTO DIFF WBC: CPT

## 2021-12-06 RX ADMIN — DEXTROSE MONOHYDRATE PRN ML: 25 INJECTION, SOLUTION INTRAVENOUS at 23:16

## 2021-12-06 RX ADMIN — SODIUM CHLORIDE, SODIUM LACTATE, POTASSIUM CHLORIDE, AND CALCIUM CHLORIDE SCH MLS/HR: 600; 310; 30; 20 INJECTION, SOLUTION INTRAVENOUS at 23:30

## 2021-12-06 SDOH — ECONOMIC STABILITY - HOUSING INSECURITY: SHELTERED HOMELESSNESS: Z59.01

## 2021-12-06 NOTE — XMS REPORT
Clinical Summary

                             Created on: 2021



Gaurang Del Angel

External Reference #: [id number]

: 1955

Sex: Male



Demographics





                          Address                   [place]

[place]  [address]

 

                          Home Phone                [phone]

 

                          Preferred Language        English

 

                          Marital Status            

 

                          Alevism Affiliation     [id number]

 

                          Race                      White

 

                          Ethnic Group              Not  or 





Author





                          Author                    [facility]

 

                          Address                   Unknown

 

                          Phone                     Unavailable







Support





                Name            Relationship    Address         Phone

 

                    Rosa Cruz     ECON                UNKNOWN

[place]  [id number]                       [phone]







Care Team Providers





                    Care Team Member Name Role                Phone

 

                    Unassigned, None    PCP                 Unavailable







Allergies





                                        Comments



                 Active Allergy  Reactions       Severity        Noted Date 

 

                                        



Sneezing



                     Pollen Extract      Other (See          10/26/2019 



                                         Comments)   







Medications





                          End Date                  Status



              Medication   Sig          Dispensed    Refills      Start  



                                         Date  

 

                                                    Active



              lamoTRIgine (LAMICTAL) 25  Take 1 tab   60 tablet    0            

10/28/201  



                     MG tabletIndications:  daily for 1         9  



                           Bipolar I disorder,       week then     



                           current or most recent    take  tablets     



                           episode manic, with       daily     



                                         psychotic features (HCC)      

 

                                                    Active



              buPROPion (WELLBUTRIN XL)  Take 1 tablet  30 tablet    0          

  10/29/201  



                     300 MG 24 hr        (300 mg             9  



                           tabletIndications:        total) by     



                           Bipolar I disorder,       mouth daily.     



                                         current or most recent      



                                         episode manic, with      



                                         psychotic features (HCC)      

 

                                                    Active



              traZODone (DESYREL) 100  Take 2       60 tablet    0            10

/28/201  



                     MG tabletIndications:  tablets (200        9  



                           Insomnia                  mg total) by     



                                         mouth at     



                                         bedtime as     



                                         needed for     



                                         Sleep.     



                                         Indications:     



                                         Trouble     



                                         Sleeping     

 

                                                    Active



              OLANZapine (ZYPREXA) 10  Take 1 tablet  30 tablet    0            

10/28/201  



                     MG tabletIndications:  (10 mg total)       9  



                           Bipolar Mood Disorder     by mouth at     



                                         bedtime.     



                                         Indications:     



                                         Manic-Depress     



                                         ion     







Active Problems





 



                           Problem                   Noted Date

 

 



                           JAIME (generalized anxiety disorder)  10/21/2019

 

 



                           PTSD (post-traumatic stress disorder)  10/21/2019

 

 



                           Bipolar I disorder, current or most recent episode ma

merlene, with psychotic  

10/20/2019



                                         features 







Resolved Problems





  



                     Problem             Noted Date          Resolved Date

 

  



                     Suicide ideation    10/20/2019          10/28/2019







Immunizations





  



                     Name                Administration Dates  Next Due

 

  



                           INFLUENZA A&B TRIVALENT   10/21/2019 



                                         VAC ADJUVANTED PF  



                                         (Adults=>65 y/o-FLUAD)  

 

  



                           Pneumococcal Conjugate    10/28/2019 



                                         (13-valent)  







Family History





   



                 Medical History  Relation        Name            Comments

 

   



                           Alcohol abuse             Father  

 

   



                           Mental illness            Father  







   



                 Relation        Name            Status          Comments

 

   



                                         Father   







Social History





                                        Date



                 Tobacco Use     Types           Packs/Day       Years Used 

 

                                         



                           Current Every Day Smoker   0  

 

    



                                         Smokeless Tobacco: Never   



                                         Used   







                                        Comments



                           Alcohol Use               Standard Drinks/Week 

 

                                         



                           Not Currently             0 (1 standard drink = 0.6 o

z pure alcohol) 







                    Birth Control       Partners            Comments



                                         Sexually Active   

 

                                                             



                                         Not Currently   







 



                           Sex Assigned at Birth     Date Recorded

 

 



                           Male                      10/20/2019  2:19 PM CDT







Last Filed Vital Signs





                    Reading             Time Taken          Comments



                                         Vital Sign   

 

                    96/58               10/28/2019  5:41 AM CDT  



                                         Blood Pressure   

 

                    81                  10/28/2019  5:41 AM CDT  



                                         Pulse   

 

                    36.8 °C (98.3 °F) 10/28/2019  5:40 AM CDT  



                                         Temperature   

 

                    16                  10/28/2019  5:41 AM CDT  



                                         Respiratory Rate   

 

                    96%                 10/28/2019  5:40 AM CDT  



                                         Oxygen Saturation   

 

                    -                   -                    



                                         Inhaled Oxygen   



                                         Concentration   

 

                    65.8 kg (145 lb)    2021  5:06 PM CDT  



                                         Weight   

 

                    177.8 cm (5' 10")   2021  5:06 PM CDT  



                                         Height   

 

                    20.81               2021  5:06 PM CDT  



                                         Body Mass Index   







Plan of Treatment





   



                 Health Maintenance  Due Date        Last Done       Comments

 

   



                           Annual Wellness Visit     1972  

 

   



                           Hepatitis C Screening     1972  

 

   



                           DTaP,Tdap,and Td Vaccines  1973  



                                         (1 - Tdap)   

 

   



                           Colon Cancer Screening    2004  

 

   



                           Zoster Vaccine (1 of 2)   2004  

 

   



                     Pneumo-Vaccine: 65+Yrs (2  10/28/2020          10/28/2019 



                                         of 2 - PPSV23)   

 

   



                     COVID-19 Vaccine (2 -  2021, 



                           Booster for Jaylen       2021 



                                         series)   

 

   



                     Influenza Vaccine (#1)  2021          10/21/2019 

 

   



                 Pneumo-Vaccine: Peds (0-5  Aged Out        10/28/2019      No l

onger eligible based on 

patient's age to



                           Yrs) & At-Risk Patients    complete this topic



                                         (6-64 Yrs)   

 

   



                     HIB Vaccines        Aged Out            No longer eligible 

based on patient's age to



                                         complete this topic

 

   



                     IPV Vaccines        Aged Out            No longer eligible 

based on patient's age to



                                         complete this topic

 

   



                     Meningococcal Vaccine  Aged Out            No longer eligib

le based on patient's age to



                                         complete this topic

 

   



                     Rotavirus Vaccines  Aged Out            No longer eligible 

based on patient's age to



                                         complete this topic







Results

Not on filefrom Last 3 Months



Insurance





                                        Type



            Payer      Benefit    Subscriber ID  Effective  Phone      Address 



                           Plan /                    Dates   



                                         Group     

 

                                        Medicare



            [employer] [facility]  MEDICARE   [id number]  2019-P  [phone]  [address] 

[address] 



                     [facility]                 [address]              [id number] 



                                         [place] 



                                         [id number] 







Advance Directives





For more information, please contact: [phone]







                          Patient Representative    Explanation



                           Type                      Date Recorded  

 

                                                     



                                         Advance Directives   



                                         and Living Will   

 

                                                     



                                         Power of    











                          Date Inactivated          Comments



                           Code Status               Date Activated  

 

                                                     



                           Full Code                 10/28/2019 10:09 AM  







                                                     

 

                          10/28/2019 10:09 AM        



                           Full Code                 10/20/2019  3:14 PM  







Care Teams





                          Start Date                End Date



                     Team Member         Relationship        Specialty  

 

                          10/20/19                   



                           Unassigned, None          PCP - General   



                                         [place]

## 2021-12-06 NOTE — ED NEUROLOGICAL PROBLEM
General


Chief Complaint:  Altered Mental Status


Stated Complaint:  UNRESPONSIVE


Nursing Triage Note:  


Pt to ER by [facility] with complaint of altered mental 


status/unresponsive.  Pt lives at a hotel in [facility] and had not been seen for two 


days. Management entered room and found pt in bathtub, cold to the touch, 


covered in urine and he would not respond.


Source:  EMS


Exam Limitations:  clinical condition





History of Present Illness


Date Seen by Provider:  Dec 6, 2021


Time Seen by Provider:  15:18


Initial Comments


This is 66-year-old male presents to the emergency room via EMS with 

"unresponsive" state.  He lives at a local hotel where no but he had heard from 

him for a few days.  Staff went to check on him and found him dressed lying in 

the bathtub.  He felt cool to the touch and was not responding.  Vital signs 

were stable for EMS.  Patient does respond purposefully to touch and movement 

but he will not talk.  Patient is known to have significant behavioral 

disturbances and be uncooperative at times.  He appeared incontinent as the 

clothing and bathtub were urine soaked.  Fingerstick blood sugar was in the 90s 

for EMS but 66 for ER staff.





Allergies and Home Medications


Allergies


Coded Allergies:  


     No Known Drug Allergies (Unverified , 19)





Patient Home Medication List


Home Medication List Reviewed:  Yes


Lithium Carbonate (Lithium Carbonate) 300 Mg Capsule, 300 MG PO BID, (Reported)


   Entered as Reported by: ALLIE GREEN on 21 1051


Olanzapine (Zyprexa) 5 Mg Tablet, 5 MG PO DAILY


   Prescribed by: PARKER BELLAMY on 10/27/21 1242





Review of Systems


Review of Systems


Constitutional:  no symptoms reported


Eyes:  No Symptoms Reported


Ears, Nose, Mouth, Throat:  no symptoms reported


Respiratory:  no symptoms reported


Cardiovascular:  no symptoms reported


Gastrointestinal:  no symptoms reported


Genitourinary:  no symptoms reported


Musculoskeletal:  no symptoms reported


Skin:  no symptoms reported


Psychiatric/Neurological:  See HPI


Endocrine:  No Symptoms Reported


Hematologic/Lymphatic:  No Symptoms Reported





Past Medical-Social-Family Hx


Patient Social History


Tobacco Use?:  Yes


Tobacco type used:  Cigarettes


Use of E-Cig and/or Vaping dev:  Unable to obtain


Substance use?:  Unable to obtain


Alcohol Use?:  Yes


Alcohol type:  Hard Liquor


Pt feels they are or have been:  Unable to obtain





Immunizations Up To Date


Tetanus Booster (TDap):  Unknown


Influenza Vaccine Up-to-Date:  No; Not Current


First/Initial COVID19 Vaccinat:  SEPT


Second COVID19 Vaccination [place]:  SEPT


Third COVID19 Vaccination Date:  SEPT





Seasonal Allergies


Seasonal Allergies:  Yes





Past Medical History


Surgery/Hospitalization HX:  


MENTAL HEALTH HISTORY


Surgeries:  Yes (HERNIA REPAIR,)


Abdominal, Transurethral Resection


Respiratory:  No


Cardiac:  No


Neurological:  No


Genitourinary:  Yes (S/P TURP)


Benign Prostatic Hyperpl, Prostate Problems


Gastrointestinal:  Yes (LEFT INGUINAL HERNIA)


Abdominal Hernia


Musculoskeletal:  No


Endocrine:  No


HEENT:  No


Cancer:  No


Psychosocial:  Yes ("BIPOLAR TYPE 2-SCHIZOID NO DILUSIONS";SUICIDAL 

IDEATIONS;POLYSUBSTANCE ABU)


Sleep Difficulties, Anxiety, Suicide Attempts, Bipolar, Schizophrenia, 

Depression


Integumentary:  No


Blood Disorders:  No





Family Medical History


No Pertinent Family Hx





SOCIAL HISTORY:


-ETOH--2-3 MIXED DRINKS A DAY. NONE FOR 2-3 MONTHS, PER PT ON 21


-DRUGS--METHAMPHETAMINES, BLACK TAR HEROIN, THC. DENIES IV USE. CLAIMS NO


USE FOR 2 WEEKS, PER PT ON 21


-SMOKED 1 PPD, STATES HE QUIT 2-3 MONTHS AGO, PER PT ON 21








ADDITIONAL HISTORY: 


-MULTIPLE PSYCH ADMITS, INCLUDING [place]


ALSO [facility], [facility], [place] [facility],


[place] [facility], OTHERS.





LONG HISTORY OF HOMELESSNESS, LIVING IN LOCAL HOTELS AT INTERVALS


LONG HISTORY OF VERY MANIPULATIVE BEHAVIOR





Physical Exam


Vital Signs





Vital Signs - First Documented








 21





 15:18


 


Temp 37.4


 


Pulse 67


 


Resp 16


 


Pulse Ox 100


 


O2 Delivery Nasal Cannula


 


O2 Flow Rate 2.00





Capillary Refill : Less Than 3 Seconds


Height, Weight, BMI


Height: 5'10.00"


Weight: 150lbs. oz. 68.039349ou; 20.00 BMI


Method:Stated


General Appearance:  WD/WN, no apparent distress, other (Responding to some 

stimuli but will not talk.  Does have purposeful movement and adjusting his 

body.  Disheveled, clothing urine soaked)


HEENT:  PERRL/EOMI, normal ENT inspection


Neck:  non-tender, normal inspection


Respiratory:  lungs clear, normal breath sounds, no respiratory distress


Cardiovascular:  regular rate, rhythm, no edema, no murmur


Gastrointestinal:  normal bowel sounds, non tender, soft


Back:  normal inspection


Extremities:  normal inspection, no pedal edema


Neurologic/Psychiatric:  other (Patient will not talk and only has some response

to physical and verbal stimuli.)


Crainal Nerves:  PERRL


Skin:  normal color, warm/dry





Progress/Results/Core Measures


Results/Orders


Lab Results





Laboratory Tests








Test


 21


15:23 21


15:27 21


15:36 Range/Units


 


 


Glucometer 66 L     MG/DL


 


White Blood Count


 


 8.9 


 


 4.3-11.0


10^3/uL


 


Red Blood Count


 


 4.36 


 


 4.30-5.52


10^6/uL


 


Hemoglobin  13.5   13.3-17.7  g/dL


 


Hematocrit  39 L  40-54  %


 


Mean Corpuscular Volume  89   80-99  fL


 


Mean Corpuscular Hemoglobin  31   25-34  pg


 


Mean Corpuscular Hemoglobin


Concent 


 35 


 


 32-36  g/dL





 


Red Cell Distribution Width  12.6   10.0-14.5  %


 


Platelet Count


 


 204 


 


 130-400


10^3/uL


 


Mean Platelet Volume  10.8   9.0-12.2  fL


 


Immature Granulocyte % (Auto)  0    %


 


Neutrophils (%) (Auto)  82 H  42-75  %


 


Lymphocytes (%) (Auto)  11 L  12-44  %


 


Monocytes (%) (Auto)  6   0-12  %


 


Eosinophils (%) (Auto)  0   0-10  %


 


Basophils (%) (Auto)  0   0-10  %


 


Neutrophils # (Auto)


 


 7.3 


 


 1.8-7.8


10^3/uL


 


Lymphocytes # (Auto)


 


 1.0 


 


 1.0-4.0


10^3/uL


 


Monocytes # (Auto)


 


 0.5 


 


 0.0-1.0


10^3/uL


 


Eosinophils # (Auto)


 


 0.0 


 


 0.0-0.3


10^3/uL


 


Basophils # (Auto)


 


 0.0 


 


 0.0-0.1


10^3/uL


 


Immature Granulocyte # (Auto)


 


 0.0 


 


 0.0-0.1


10^3/uL


 


Sodium Level  138   135-145  MMOL/L


 


Potassium Level  3.6   3.6-5.0  MMOL/L


 


Chloride Level  101     MMOL/L


 


Carbon Dioxide Level  23   21-32  MMOL/L


 


Anion Gap  14   5-14  MMOL/L


 


Blood Urea Nitrogen  73 H  7-18  MG/DL


 


Creatinine


 


 2.05 H


 


 0.60-1.30


MG/DL


 


Estimat Glomerular Filtration


Rate 


 33 


 


  





 


BUN/Creatinine Ratio  36    


 


Glucose Level  87     MG/DL


 


Calcium Level  8.8   8.5-10.1  MG/DL


 


Corrected Calcium  9.0   8.5-10.1  MG/DL


 


Magnesium Level  2.4   1.6-2.4  MG/DL


 


Total Bilirubin  0.8   0.1-1.0  MG/DL


 


Aspartate Amino Transf


(AST/SGOT) 


 89 H


 


 5-34  U/L





 


Alanine Aminotransferase


(ALT/SGPT) 


 36 


 


 0-55  U/L





 


Alkaline Phosphatase  85     U/L


 


Total Creatine Kinase  2279 H    U/L


 


Total Protein  6.3 L  6.4-8.2  GM/DL


 


Albumin  3.7   3.2-4.5  GM/DL


 


Salicylates Level  < 5.0 L  5.0-20.0  MG/DL


 


Urine Opiates Screen  NEGATIVE   NEGATIVE  


 


Urine Oxycodone Screen  NEGATIVE   NEGATIVE  


 


Urine Methadone Screen  NEGATIVE   NEGATIVE  


 


Urine Propoxyphene Screen  NEGATIVE   NEGATIVE  


 


Acetaminophen Level  < 10 L  10-30  UG/ML


 


Urine Barbiturates Screen  NEGATIVE   NEGATIVE  


 


Ur Tricyclic Antidepressants


Screen 


 NEGATIVE 


 


 NEGATIVE  





 


Urine Phencyclidine Screen  NEGATIVE   NEGATIVE  


 


Urine Amphetamines Screen  NEGATIVE   NEGATIVE  


 


Urine Methamphetamines Screen  NEGATIVE   NEGATIVE  


 


Urine Benzodiazepines Screen  NEGATIVE   NEGATIVE  


 


Urine Cocaine Screen  NEGATIVE   NEGATIVE  


 


Urine Cannabinoids Screen  NEGATIVE   NEGATIVE  


 


Serum Alcohol  < 10   <10  MG/DL


 


Urine Color   YELLOW   


 


Urine Clarity   SL CLOUDY   


 


Urine pH   6.0  5-9  


 


Urine Specific Gravity   1.025 H 1.016-1.022  


 


Urine Protein   1+ H NEGATIVE  


 


Urine Glucose (UA)   1+ H NEGATIVE  


 


Urine Ketones   TRACE H NEGATIVE  


 


Urine Nitrite   NEGATIVE  NEGATIVE  


 


Urine Bilirubin   NEGATIVE  NEGATIVE  


 


Urine Urobilinogen   0.2  < = 1.0  MG/DL


 


Urine Leukocyte Esterase   NEGATIVE  NEGATIVE  


 


Urine RBC (Auto)   3+ H NEGATIVE  


 


Urine RBC   5-10 H  /HPF


 


Urine WBC   0-2   /HPF


 


Urine Crystals   PRESENT H  /LPF


 


Urine Amorphous Sediment


 


 


 MOD RANI


URATES H  /LPF





 


Urine Bacteria   NEGATIVE   /HPF


 


Urine Casts   PRESENT   /LPF


 


Urine Hyaline Casts   0-2 H  /LPF


 


Urine Granular Casts   0-2 H  /LPF


 


Urine Mucus   NEGATIVE   /LPF


 


Urine Culture Indicated   NO   








My Orders





Orders - PRASHANT GRIJALVA MD


D50w (Emergency) Syringe (Dextrose 50% 5 (21 15:25)


Acetaminophen (21 15:37)


Alcohol (21 15:37)


Cbc With Automated Diff (21 15:37)


Comprehensive Metabolic Panel (21 15:37)


Drug Screen Stat (Urine) (21 15:37)


Magnesium (21 15:37)


Salicylate (21 15:37)


Ua Culture If Indicated (21 15:37)


Ed Iv/Invasive Line Start (21 15:37)


Ns Iv 1000 Ml (Sodium Chloride 0.9%) (21 15:45)


D50w (Emergency) Syringe (Dextrose 50% 5 (21 15:45)


Ct Head/Cervical Spine Wo (21 15:42)


Naloxone  Injection (Narcan Injection) (21 16:09)


Creatine Kinase (21 16:28)


Lithium Level (21 18:36)


Thyroid Stimulating Hormone (21 18:36)


Free T4 (Free Thyroxine) (21 18:36)


Ekg Tracing (21 18:36)


Monitor-Rhythm Ecg Trace Only (21 18:36)





Medications Given in ED





Current Medications








 Medications  Dose


 Ordered  Sig/Veronique


 Route  Start Time


 Stop Time Status Last Admin


Dose Admin


 


 Dextrose  50 ml  STK-MED ONCE


 .ROUTE  21 15:25


 21 15:27 DC 21 15:32


50 ML


 


 Naloxone HCl  0.4 mg  STK-MED ONCE


 .ROUTE  21 16:09


 21 16:12 DC 21 16:24


0.4 MG








Vital Signs/I&O











 21





 15:18


 


Temp 37.4


 


Pulse 67


 


Resp 16


 


B/P (MAP) 


 


Pulse Ox 100


 


O2 Delivery Nasal Cannula


 


O2 Flow Rate 2.00











Progress


Progress Note :  


Progress Note


CT head and cervical spine were unremarkable.  Patient was found to have acute 

kidney injury with mild rhabdomyolysis.  Lithium level was obtained as well as 

EKG.  Patient was ultimately admitted for observation.  A liter of IV normal 

saline was administered.


Initial ECG Impression Date:  Dec 6, 2021


Initial ECG Impression Time:  18:50


Initial ECG Rate:  63


Initial ECG Rhythm:  Normal Sinus


Comment


Sinus rhythm with no ST elevation or depression.  QT prolongation with QTC of 

544.  No axis deviation.





Diagnostic Imaging





   Diagonstic Imaging:  CT


   Plain Films/CT/US/NM/MRI:  c-spine, head


Comments


CT head and C-spine report reviewed.  See report below:





NAME:   CINDY PAYNE


MED REC#:   [id number]


ACCOUNT#:   [id number]


PT STATUS:   REG ER


:   1955


PHYSICIAN:   PRASHANT GRIJALVA MD


ADMIT DATE:   21/ER


***Signed***


Date of Exam:21





CT HEAD/CERVICAL SPINE WO








PROCEDURE: CT head and CT cervical spine without contrast.





TECHNIQUE: Multiple contiguous axial images were obtained through


the brain and cervical spine without the use of intravenous


contrast. Sagittal and coronal reformations through the cervical


spine were then performed. Auto Exposure Controls were utilized


during the CT exam to meet ALARA standards for radiation dose


reduction. 





INDICATION: Altered mental status, trauma, found unresponsive.





COMPARISON: None.





FINDINGS:





CT HEAD: The ventricles and cortical sulci are mildly prominent,


likely from generalized parenchymal volume loss. There is no CT


evidence of acute territorial ischemia. No acute intracranial


hemorrhage is seen. The calvarium is intact. The visualized


paranasal sinuses are clear.





CT CERVICAL SPINE: There is reversal of the cervical lordosis


centered at C4. There is advanced degenerative change at C4-C5


and C5-C6 with additional degenerative changes elsewhere in the


cervical spine. Vertebral body heights are preserved. No acute


fracture is seen. No bony fragments or hyperdense fluid


collections are seen in the spinal canal. Surrounding soft


tissues demonstrate no acute abnormality.





IMPRESSION:


1. No acute intracranial hemorrhage or calvarium fracture. No CT


evidence of acute territorial ischemia.


2. Degenerative changes in the cervical spine with no acute


fracture seen.





Dictated by: 





  Dictated on workstation # [id number]








Dict:   21 1628


Trans:   21 1641


[id number]





Interpreted by:     GAMAL WALKER MD


Electronically signed by: GAMAL WALKER MD 21 1641





Departure


Communication (Admissions)


Time/Spoke to Admitting Phy:  18:30


Dr. Bartholomew





Impression





   Primary Impression:  


   Altered mental status


   Qualified Codes:  R41.82 - Altered mental status, unspecified


   Additional Impressions:  


   Acute kidney injury


   Rhabdomyolysis


   Qualified Codes:  T79.6XXA - Traumatic ischemia of muscle, initial encounter


Disposition:   ADMITTED AS INPATIENT


Condition:  Stable





Admissions


Decision to Admit Reason:  Admit from ER (General)


Decision to Admit/Date:  Dec 6, 2021


Time/Decision to Admit Time:  18:20





Departure-Patient Inst.


Referrals:  


[facility] (PCP/Family)


Primary Care Physician











PRASHANT GRIJALVA MD         Dec 6, 2021 19:17

## 2021-12-06 NOTE — DIAGNOSTIC IMAGING REPORT
PROCEDURE: CT head and CT cervical spine without contrast.



TECHNIQUE: Multiple contiguous axial images were obtained through

the brain and cervical spine without the use of intravenous

contrast. Sagittal and coronal reformations through the cervical

spine were then performed. Auto Exposure Controls were utilized

during the CT exam to meet ALARA standards for radiation dose

reduction. 



INDICATION: Altered mental status, trauma, found unresponsive.



COMPARISON: None.



FINDINGS:



CT HEAD: The ventricles and cortical sulci are mildly prominent,

likely from generalized parenchymal volume loss. There is no CT

evidence of acute territorial ischemia. No acute intracranial

hemorrhage is seen. The calvarium is intact. The visualized

paranasal sinuses are clear.



CT CERVICAL SPINE: There is reversal of the cervical lordosis

centered at C4. There is advanced degenerative change at C4-C5

and C5-C6 with additional degenerative changes elsewhere in the

cervical spine. Vertebral body heights are preserved. No acute

fracture is seen. No bony fragments or hyperdense fluid

collections are seen in the spinal canal. Surrounding soft

tissues demonstrate no acute abnormality.



IMPRESSION:

1. No acute intracranial hemorrhage or calvarium fracture. No CT

evidence of acute territorial ischemia.

2. Degenerative changes in the cervical spine with no acute

fracture seen.



Dictated by: 



  Dictated on workstation # [id number]

## 2021-12-07 VITALS — DIASTOLIC BLOOD PRESSURE: 75 MMHG | SYSTOLIC BLOOD PRESSURE: 122 MMHG

## 2021-12-07 VITALS — DIASTOLIC BLOOD PRESSURE: 81 MMHG | SYSTOLIC BLOOD PRESSURE: 141 MMHG

## 2021-12-07 VITALS — DIASTOLIC BLOOD PRESSURE: 62 MMHG | SYSTOLIC BLOOD PRESSURE: 132 MMHG

## 2021-12-07 VITALS — SYSTOLIC BLOOD PRESSURE: 116 MMHG | DIASTOLIC BLOOD PRESSURE: 59 MMHG

## 2021-12-07 VITALS — SYSTOLIC BLOOD PRESSURE: 124 MMHG | DIASTOLIC BLOOD PRESSURE: 79 MMHG

## 2021-12-07 VITALS — DIASTOLIC BLOOD PRESSURE: 63 MMHG | SYSTOLIC BLOOD PRESSURE: 123 MMHG

## 2021-12-07 LAB
BASOPHILS # BLD AUTO: 0 10^3/UL (ref 0–0.1)
BASOPHILS NFR BLD AUTO: 0 % (ref 0–10)
BUN/CREAT SERPL: 40
CALCIUM SERPL-MCNC: 8.2 MG/DL (ref 8.5–10.1)
CHLORIDE SERPL-SCNC: 104 MMOL/L (ref 98–107)
CK SERPL-CCNC: 1356 U/L (ref 30–200)
CO2 SERPL-SCNC: 19 MMOL/L (ref 21–32)
CREAT SERPL-MCNC: 1.19 MG/DL (ref 0.6–1.3)
EOSINOPHIL # BLD AUTO: 0.1 10^3/UL (ref 0–0.3)
EOSINOPHIL NFR BLD AUTO: 1 % (ref 0–10)
GFR SERPLBLD BASED ON 1.73 SQ M-ARVRAT: 61 ML/MIN
GLUCOSE SERPL-MCNC: 78 MG/DL (ref 70–105)
HCT VFR BLD CALC: 34 % (ref 40–54)
HGB BLD-MCNC: 11.9 G/DL (ref 13.3–17.7)
LYMPHOCYTES # BLD AUTO: 1.1 10^3/UL (ref 1–4)
LYMPHOCYTES NFR BLD AUTO: 14 % (ref 12–44)
MANUAL DIFFERENTIAL PERFORMED BLD QL: NO
MCH RBC QN AUTO: 31 PG (ref 25–34)
MCHC RBC AUTO-ENTMCNC: 35 G/DL (ref 32–36)
MCV RBC AUTO: 88 FL (ref 80–99)
MONOCYTES # BLD AUTO: 0.6 10^3/UL (ref 0–1)
MONOCYTES NFR BLD AUTO: 8 % (ref 0–12)
NEUTROPHILS # BLD AUTO: 5.6 10^3/UL (ref 1.8–7.8)
NEUTROPHILS NFR BLD AUTO: 77 % (ref 42–75)
PLATELET # BLD: 186 10^3/UL (ref 130–400)
PMV BLD AUTO: 11.1 FL (ref 9–12.2)
POTASSIUM SERPL-SCNC: 3.3 MMOL/L (ref 3.6–5)
SODIUM SERPL-SCNC: 138 MMOL/L (ref 135–145)
WBC # BLD AUTO: 7.3 10^3/UL (ref 4.3–11)

## 2021-12-07 RX ADMIN — DEXTROSE MONOHYDRATE PRN ML: 25 INJECTION, SOLUTION INTRAVENOUS at 21:30

## 2021-12-07 RX ADMIN — SODIUM CHLORIDE, SODIUM LACTATE, POTASSIUM CHLORIDE, AND CALCIUM CHLORIDE SCH MLS/HR: 600; 310; 30; 20 INJECTION, SOLUTION INTRAVENOUS at 08:55

## 2021-12-07 RX ADMIN — SODIUM CHLORIDE, SODIUM LACTATE, POTASSIUM CHLORIDE, AND CALCIUM CHLORIDE SCH MLS/HR: 600; 310; 30; 20 INJECTION, SOLUTION INTRAVENOUS at 13:47

## 2021-12-07 RX ADMIN — POTASSIUM CHLORIDE SCH MLS/HR: 200 INJECTION, SOLUTION INTRAVENOUS at 11:28

## 2021-12-07 RX ADMIN — POTASSIUM CHLORIDE SCH MLS/HR: 200 INJECTION, SOLUTION INTRAVENOUS at 12:58

## 2021-12-07 RX ADMIN — POTASSIUM CHLORIDE SCH MLS/HR: 200 INJECTION, SOLUTION INTRAVENOUS at 13:47

## 2021-12-07 RX ADMIN — SODIUM CHLORIDE, SODIUM LACTATE, POTASSIUM CHLORIDE, AND CALCIUM CHLORIDE SCH MLS/HR: 600; 310; 30; 20 INJECTION, SOLUTION INTRAVENOUS at 04:19

## 2021-12-07 NOTE — HISTORY & PHYSICAL
HPI


History of Present Illness:


65 yo M that was found in his hotel room in the bathtub unresponsive after they 

had not seen him for several days. Patient opening eyes and responding looking 

at people talking to him then closing them and going back to sleep. Refusing to 

eat or drink. Recent admission to [facility] for EtOH abuse.


Source:  patient


Exam Limitations:  clinical condition


Date seen by provider:  Dec 7, 2021


Time Seen by Provider:  09:15


Attending Physician


Christin Bartholomew MD


PCP


[place]


Consult





Date of Admission


Dec 6, 2021 at 19:08





Home Medications


Home Medications


Reviewed patient Home Medication Reconciliation performed by pharmacy medication

reconciliations technician and/or nursing.


Patients Allergies have been reviewed.





Allergies


Coded Allergies:  


     No Known Drug Allergies (Unverified , 8/25/19)





PMH-Social-Family Hx


Patient Social History


Living Status:  Living in hotel independently


Drug of Choice:  METHAMPHETAMINE, HEROIN, THC--DENIES IV USE


Smoking Status:  Current Someday Smoker


2nd Hand Smoke Exposure:  No


Recent Hopitalizations:  No


Alcohol Use?:  Yes


Substance type:  Methamphetamine


Tobacco type used:  Cigarettes


Have you traveled recently?:  No





Immunizations Up To Date


Tetanus Booster (TDap):  Unknown


Date of Influenza Vaccine:  Oct 1, 2021





Past Medical History





MDD


Bipolar


Hx Drug Use (Heroin/Methamphetamine)


Anxiety


Inguinal Hernia


Prostatectomy/BPH





Family Medical History


Significant Family History:  No Pertinent Family Hx


Other Significan Family Hx:  


SOCIAL HISTORY:


-ETOH--2-3 MIXED DRINKS A DAY. NONE FOR 2-3 MONTHS, PER PT ON 05/14/21


-DRUGS--METHAMPHETAMINES, BLACK TAR HEROIN, THC. DENIES IV USE. CLAIMS NO


USE FOR 2 WEEKS, PER PT ON 05/14/21


-SMOKED 1 PPD, STATES HE QUIT 2-3 MONTHS AGO, PER PT ON 05/14/21








ADDITIONAL HISTORY: 


-MULTIPLE PSYCH ADMITS, INCLUDING [place]


ALSO [facility], [facility], [facility] [facility],


[facility], OTHERS.





LONG HISTORY OF HOMELESSNESS, LIVING IN LOCAL HOTELS AT INTERVALS


LONG HISTORY OF VERY MANIPULATIVE BEHAVIOR





Review of Systems ([facility])


Constitutional:  other (Will not answer questions)





Reviewed Test Results


Reviewed Test Results


Lab





Laboratory Tests








Test


 12/6/21


22:56 12/6/21


23:45 12/7/21


05:21 12/7/21


06:30 Range/Units


 


 


Glucometer 62 L 147 H 76     MG/DL


 


White Blood Count


 


 


 


 7.3 


 4.3-11.0


10^3/uL


 


Red Blood Count


 


 


 


 3.84 L


 4.30-5.52


10^6/uL


 


Hemoglobin    11.9 L 13.3-17.7  g/dL


 


Hematocrit    34 L 40-54  %


 


Mean Corpuscular Volume    88  80-99  fL


 


Mean Corpuscular Hemoglobin    31  25-34  pg


 


Mean Corpuscular Hemoglobin


Concent 


 


 


 35 


 32-36  g/dL





 


Red Cell Distribution Width    12.6  10.0-14.5  %


 


Platelet Count


 


 


 


 186 


 130-400


10^3/uL


 


Mean Platelet Volume    11.1  9.0-12.2  fL


 


Immature Granulocyte % (Auto)    0   %


 


Neutrophils (%) (Auto)    77 H 42-75  %


 


Lymphocytes (%) (Auto)    14  12-44  %


 


Monocytes (%) (Auto)    8  0-12  %


 


Eosinophils (%) (Auto)    1  0-10  %


 


Basophils (%) (Auto)    0  0-10  %


 


Neutrophils # (Auto)


 


 


 


 5.6 


 1.8-7.8


10^3/uL


 


Lymphocytes # (Auto)


 


 


 


 1.1 


 1.0-4.0


10^3/uL


 


Monocytes # (Auto)


 


 


 


 0.6 


 0.0-1.0


10^3/uL


 


Eosinophils # (Auto)


 


 


 


 0.1 


 0.0-0.3


10^3/uL


 


Basophils # (Auto)


 


 


 


 0.0 


 0.0-0.1


10^3/uL


 


Immature Granulocyte # (Auto)


 


 


 


 0.0 


 0.0-0.1


10^3/uL


 


Sodium Level    138  135-145  MMOL/L


 


Potassium Level    3.3 L 3.6-5.0  MMOL/L


 


Chloride Level    104    MMOL/L


 


Carbon Dioxide Level    19 L 21-32  MMOL/L


 


Anion Gap    15 H 5-14  MMOL/L


 


Blood Urea Nitrogen    48 H 7-18  MG/DL


 


Creatinine


 


 


 


 1.19 


 0.60-1.30


MG/DL


 


Estimat Glomerular Filtration


Rate 


 


 


 61 


  





 


BUN/Creatinine Ratio    40   


 


Glucose Level    78    MG/DL


 


Calcium Level    8.2 L 8.5-10.1  MG/DL


 


Total Creatine Kinase    1356 H   U/L


 


Test


 12/7/21


07:14 12/7/21


09:56 12/7/21


17:05 12/7/21


20:23 Range/Units


 


 


Glucometer 70  73  71  63 L   MG/DL


 


Test


 12/7/21


22:28 12/8/21


05:39 12/8/21


12:08 12/8/21


15:30 Range/Units


 


 


Glucometer 128 H 75  69 L 70    MG/DL


 


Test


 12/8/21


20:45 


 


 


 Range/Units


 


 


Glucometer 67 L      MG/DL











Physical Exam-([facility])


Physical Exam


Vital Signs





                          VS - Last 72 Hours, by Label








 12/6/21 12/6/21 12/6/21 12/6/21





 15:18 22:33 22:39 23:58


 


Temp 37.4   36.2


 


Pulse 67 74 77 75


 


Resp 16 18  18


 


B/P (MAP)  137/71 (93)  125/73 (90)


 


Pulse Ox 100 100  100


 


O2 Delivery Nasal Cannula Room Air  Room Air


 


O2 Flow Rate 2.00   


 


    





 12/7/21 12/7/21 12/7/21 12/7/21





 01:00 03:27 03:55 07:00


 


Temp   36.0 


 


Pulse 98  71 68


 


Resp   18 


 


B/P (MAP)   122/75 (91) 


 


Pulse Ox  100 99 


 


O2 Delivery  Room Air Room Air 


 


    





 12/7/21 12/7/21 12/7/21 12/7/21





 08:00 08:37 12:00 12:27


 


Temp 36.3  35.7 


 


Pulse 76  64 55


 


Resp 14  16 


 


B/P (MAP) 124/79 (94)  141/81 (101) 


 


Pulse Ox 99  94 


 


O2 Delivery Room Air Room Air Room Air 


 


    





 12/7/21 12/7/21 12/7/21 12/7/21





 13:00 16:59 18:18 19:27


 


Temp  36.4  36.2


 


Pulse  71  82


 


Resp  16  16


 


B/P (MAP)  132/62 (85)  116/59 (78)


 


Pulse Ox  98  98


 


O2 Delivery Room Air Room Air Room Air Room Air


 


    





 12/7/21 12/7/21 12/8/21 12/8/21





 20:45 23:26 04:38 08:00


 


Temp  36.3 36.4 


 


Pulse  72 78 


 


Resp  18 18 


 


B/P (MAP)  123/63 (83) 117/64 (81) 


 


Pulse Ox 98 96 98 98


 


O2 Delivery Room Air Room Air Room Air Room Air


 


    





 12/8/21 12/8/21 12/8/21 12/8/21





 08:00 12:00 15:27 19:40


 


Temp 36.8 35.9 36.7 36.7


 


Pulse 69 67 69 72


 


Resp 16 16 19 19


 


B/P (MAP) 156/72 (100) 154/72 (99) 139/64 (89) 141/66 (91)


 


Pulse Ox 98 100 100 100


 


O2 Delivery Room Air Room Air Room Air Room Air





Capillary Refill : Less Than 3 Seconds


General Appearance:  WD/WN, no apparent distress, other (Non verbal)


HEENT:  PERRL/EOMI


Neck:  non-tender, full range of motion


Respiratory:  chest non-tender, lungs clear, normal breath sounds, no 

respiratory distress, no accessory muscle use


Cardiovascular:  normal peripheral pulses, regular rate, rhythm, no edema, no 

murmur


Gastrointestinal:  normal bowel sounds, soft, no organomegaly


Extremities:  normal range of motion, non-tender, normal inspection, no pedal 

edema, no calf tenderness, normal capillary refill


Neurologic/Psychiatric:  other (opens eyes to name then closes them and goes 

back to sleep)


Skin:  normal color, warm/dry


Lymphatic:  no adenopathy





Assessment/Plan


Assessment/Plan


Admission Status:  Observation





(1) Altered mental status


Status:  Acute


Assessment & Plan:  - Normal UDS, hypoglycemia, encephalopathy, Will get MRI


Qualifiers:  


   Qualified Codes:  R41.82 - Altered mental status, unspecified


(2) Rhabdomyolysis


Status:  Acute


Assessment & Plan:  - IVFs, will trend


Qualifiers:  


   Qualified Codes:  T79.6XXA - Traumatic ischemia of muscle, initial encounter


(3) Acute kidney injury


Status:  Acute











CHRISTIN BARTHOLOMEW MD                Dec 7, 2021 12:33

## 2021-12-08 VITALS — DIASTOLIC BLOOD PRESSURE: 64 MMHG | SYSTOLIC BLOOD PRESSURE: 139 MMHG

## 2021-12-08 VITALS — DIASTOLIC BLOOD PRESSURE: 72 MMHG | SYSTOLIC BLOOD PRESSURE: 154 MMHG

## 2021-12-08 VITALS — SYSTOLIC BLOOD PRESSURE: 156 MMHG | DIASTOLIC BLOOD PRESSURE: 72 MMHG

## 2021-12-08 VITALS — SYSTOLIC BLOOD PRESSURE: 117 MMHG | DIASTOLIC BLOOD PRESSURE: 64 MMHG

## 2021-12-08 VITALS — SYSTOLIC BLOOD PRESSURE: 141 MMHG | DIASTOLIC BLOOD PRESSURE: 66 MMHG

## 2021-12-08 RX ADMIN — POTASSIUM CHLORIDE SCH MLS/HR: 200 INJECTION, SOLUTION INTRAVENOUS at 22:20

## 2021-12-08 RX ADMIN — POTASSIUM CHLORIDE SCH MLS/HR: 200 INJECTION, SOLUTION INTRAVENOUS at 23:14

## 2021-12-08 NOTE — PROGRESS NOTE
Subjective


Subjective/Events-last exam


Patient still not responding. Patient had multiple episodes of hypoglycemia ON


Review of Systems


Non verbal





Objective


Exam


Last Set of Vital Signs





Vital Signs








  Date Time  Temp Pulse Resp B/P (MAP) Pulse Ox O2 Delivery O2 Flow Rate FiO2


 


12/8/21 19:40 36.7 72 19 141/66 (91) 100 Room Air  


 


12/6/21 15:18       2.00 





Capillary Refill : Less Than 3 Seconds


I&O











Intake and Output 


 


 12/8/21





 00:00


 


Intake Total 2700 ml


 


Output Total 2200 ml


 


Balance 500 ml


 


 


 


Intake Oral 0 ml


 


IV Total 2700 ml


 


Output Urine Total 2200 ml


 


# Voids 1








General:  Other (Awake, opens eyes to name)


Lungs:  Clear to Auscultation, Normal Air Movement


Heart:  Regular Rate, No Murmurs


Abdomen:  Normal Bowel Sounds, Soft, No Tenderness





Results/Procedures


Lab


Laboratory Tests


12/7/21 22:28: Glucometer 128H


12/8/21 05:39: Glucometer 75


12/8/21 12:08: Glucometer 69L


12/8/21 15:30: Glucometer 70


12/8/21 20:45: Glucometer 67L





Assessment/Plan


Assessment/Plan





(1) Altered mental status


Status:  Acute


Assessment & Plan:  - Normal UDS, hypoglycemia, encephalopathy, Will get MRI


12/8: IV pulled out ON, replaced due to hypoglycemia, MRI pending, B12/Folate 

levels pending given h/o EtOH


Qualifiers:  


   Qualified Codes:  R41.82 - Altered mental status, unspecified


(2) Rhabdomyolysis


Status:  Acute


Assessment & Plan:  - IVFs, will trend


Qualifiers:  


   Qualified Codes:  T79.6XXA - Traumatic ischemia of muscle, initial encounter


(3) Acute kidney injury


Status:  Acute


Assessment & Plan:  12/8: improving, BUN elevated, continue IVFs














CHRISTIN BARFIELD MD                Dec 8, 2021 21:16

## 2021-12-09 VITALS — DIASTOLIC BLOOD PRESSURE: 75 MMHG | SYSTOLIC BLOOD PRESSURE: 141 MMHG

## 2021-12-09 VITALS — DIASTOLIC BLOOD PRESSURE: 64 MMHG | SYSTOLIC BLOOD PRESSURE: 130 MMHG

## 2021-12-09 VITALS — DIASTOLIC BLOOD PRESSURE: 68 MMHG | SYSTOLIC BLOOD PRESSURE: 143 MMHG

## 2021-12-09 VITALS — SYSTOLIC BLOOD PRESSURE: 140 MMHG | DIASTOLIC BLOOD PRESSURE: 74 MMHG

## 2021-12-09 VITALS — DIASTOLIC BLOOD PRESSURE: 77 MMHG | SYSTOLIC BLOOD PRESSURE: 132 MMHG

## 2021-12-09 VITALS — DIASTOLIC BLOOD PRESSURE: 79 MMHG | SYSTOLIC BLOOD PRESSURE: 151 MMHG

## 2021-12-09 LAB
ALBUMIN SERPL-MCNC: 3.4 GM/DL (ref 3.2–4.5)
ALP SERPL-CCNC: 76 U/L (ref 40–136)
ALT SERPL-CCNC: 32 U/L (ref 0–55)
BASOPHILS # BLD AUTO: 0 10^3/UL (ref 0–0.1)
BASOPHILS NFR BLD AUTO: 1 % (ref 0–10)
BILIRUB SERPL-MCNC: 0.9 MG/DL (ref 0.1–1)
BUN/CREAT SERPL: 23
CALCIUM SERPL-MCNC: 8.9 MG/DL (ref 8.5–10.1)
CHLORIDE SERPL-SCNC: 106 MMOL/L (ref 98–107)
CK SERPL-CCNC: 311 U/L (ref 30–200)
CO2 SERPL-SCNC: 16 MMOL/L (ref 21–32)
CREAT SERPL-MCNC: 0.98 MG/DL (ref 0.6–1.3)
EOSINOPHIL # BLD AUTO: 0.1 10^3/UL (ref 0–0.3)
EOSINOPHIL NFR BLD AUTO: 1 % (ref 0–10)
GFR SERPLBLD BASED ON 1.73 SQ M-ARVRAT: 76 ML/MIN
GLUCOSE SERPL-MCNC: 62 MG/DL (ref 70–105)
HCT VFR BLD CALC: 40 % (ref 40–54)
HGB BLD-MCNC: 13.9 G/DL (ref 13.3–17.7)
LYMPHOCYTES # BLD AUTO: 1.6 10^3/UL (ref 1–4)
LYMPHOCYTES NFR BLD AUTO: 23 % (ref 12–44)
MANUAL DIFFERENTIAL PERFORMED BLD QL: NO
MCH RBC QN AUTO: 31 PG (ref 25–34)
MCHC RBC AUTO-ENTMCNC: 35 G/DL (ref 32–36)
MCV RBC AUTO: 88 FL (ref 80–99)
MONOCYTES # BLD AUTO: 0.6 10^3/UL (ref 0–1)
MONOCYTES NFR BLD AUTO: 9 % (ref 0–12)
NEUTROPHILS # BLD AUTO: 4.6 10^3/UL (ref 1.8–7.8)
NEUTROPHILS NFR BLD AUTO: 67 % (ref 42–75)
PLATELET # BLD: 206 10^3/UL (ref 130–400)
PMV BLD AUTO: 11.5 FL (ref 9–12.2)
POTASSIUM SERPL-SCNC: 3.8 MMOL/L (ref 3.6–5)
PROT SERPL-MCNC: 6.1 GM/DL (ref 6.4–8.2)
SODIUM SERPL-SCNC: 142 MMOL/L (ref 135–145)
WBC # BLD AUTO: 6.9 10^3/UL (ref 4.3–11)

## 2021-12-09 RX ADMIN — SODIUM CHLORIDE, SODIUM LACTATE, POTASSIUM CHLORIDE, CALCIUM CHLORIDE, AND DEXTROSE MONOHYDRATE SCH MLS/HR: 600; 310; 30; 20; 5 INJECTION, SOLUTION INTRAVENOUS at 23:01

## 2021-12-09 RX ADMIN — SODIUM CHLORIDE, SODIUM LACTATE, POTASSIUM CHLORIDE, CALCIUM CHLORIDE, AND DEXTROSE MONOHYDRATE SCH MLS/HR: 600; 310; 30; 20; 5 INJECTION, SOLUTION INTRAVENOUS at 09:56

## 2021-12-09 NOTE — PROGRESS NOTE
Subjective


Subjective/Events-last exam


Patient continues to refuse to eat or drink anything. Multiple attempts have 

been made to contact his sister and we have been unable to reach.


Review of Systems


refusing to respond





Objective


Exam


Last Set of Vital Signs





Vital Signs








  Date Time  Temp Pulse Resp B/P (MAP) Pulse Ox O2 Delivery O2 Flow Rate FiO2


 


12/9/21 16:00 37.0 87 18 151/79 (103) 99 Room Air  


 


12/6/21 15:18       2.00 





Capillary Refill : Less Than 3 Seconds


I&O











Intake and Output 


 


 12/9/21





 00:00


 


Intake Total 600 ml


 


Output Total 600 ml


 


Balance 0 ml


 


 


 


Intake Oral 0 ml


 


IV Total 600 ml


 


Output Urine Total 600 ml








General:  Other (Awake)


Lungs:  Clear to Auscultation, Normal Air Movement


Heart:  Regular Rate, No Murmurs


Abdomen:  Normal Bowel Sounds, Soft


Extremities:  No Edema, No Tenderness/Swelling


Neuro:  Other (opens eyes to name but then closes them and goes back to sleep)





Results/Procedures


Lab


Laboratory Tests


12/8/21 20:45: Glucometer 67L


12/9/21 05:28: Glucometer 63L


12/9/21 06:26: 


White Blood Count 6.9, Red Blood Count 4.55, Hemoglobin 13.9, Hematocrit 40, 

Mean Corpuscular Volume 88, Mean Corpuscular Hemoglobin 31, Mean Corpuscular 

Hemoglobin Concent 35, Red Cell Distribution Width 12.4, Platelet Count 206, 

Mean Platelet Volume 11.5, Immature Granulocyte % (Auto) 0, Neutrophils (%) 

(Auto) 67, Lymphocytes (%) (Auto) 23, Monocytes (%) (Auto) 9, Eosinophils (%) 

(Auto) 1, Basophils (%) (Auto) 1, Neutrophils # (Auto) 4.6, Lymphocytes # (Auto)

1.6, Monocytes # (Auto) 0.6, Eosinophils # (Auto) 0.1, Basophils # (Auto) 0.0, 

Immature Granulocyte # (Auto) 0.0, Sodium Level 142, Potassium Level 3.8, 

Chloride Level 106, Carbon Dioxide Level 16L, Anion Gap 20H, Blood Urea Nitrogen

23H, Creatinine 0.98, Estimat Glomerular Filtration Rate 76, BUN/Creatinine 

Ratio 23, Glucose Level 62L, Calcium Level 8.9, Corrected Calcium 9.4, Total 

Bilirubin 0.9, Aspartate Amino Transf (AST/SGOT) 39H, Alanine Aminotransferase 

(ALT/SGPT) 32, Alkaline Phosphatase 76, Total Creatine Kinase 311H, Total 

Protein 6.1L, Albumin 3.4


12/9/21 10:29: Glucometer 68L


12/9/21 17:25: Glucometer 85





Assessment/Plan


Assessment/Plan





(1) Altered mental status


Status:  Acute


Assessment & Plan:  - Normal UDS, hypoglycemia, encephalopathy, Will get MRI


12/8: IV pulled out ON, replaced due to hypoglycemia, MRI pending, B12/Folate 

levels pending given h/o EtOH


12/9: IV replaced started on D5NS, MRI cancelled because patient refused moving,

labs otherwise normal


Qualifiers:  


   Qualified Codes:  R41.82 - Altered mental status, unspecified


(2) Rhabdomyolysis


Status:  Acute


Assessment & Plan:  - IVFs, will trend


Qualifiers:  


   Qualified Codes:  T79.6XXA - Traumatic ischemia of muscle, initial encounter


(3) Acute kidney injury


Status:  Resolved


Assessment & Plan:  12/8: improving, BUN elevated, continue IVFs














CHRISTIN BARFIELD MD                Dec 9, 2021 18:19

## 2021-12-10 VITALS — SYSTOLIC BLOOD PRESSURE: 144 MMHG | DIASTOLIC BLOOD PRESSURE: 75 MMHG

## 2021-12-10 VITALS — SYSTOLIC BLOOD PRESSURE: 145 MMHG | DIASTOLIC BLOOD PRESSURE: 73 MMHG

## 2021-12-10 RX ADMIN — SODIUM CHLORIDE, SODIUM LACTATE, POTASSIUM CHLORIDE, CALCIUM CHLORIDE, AND DEXTROSE MONOHYDRATE SCH MLS/HR: 600; 310; 30; 20; 5 INJECTION, SOLUTION INTRAVENOUS at 13:38

## 2021-12-10 NOTE — PROGRESS NOTE
Subjective


Subjective/Events-last exam


Patient not answering questions. Refusing to eat or drink.


Review of Systems


refusing to respond





Objective


Exam


Last Set of Vital Signs





Vital Signs








  Date Time  Temp Pulse Resp B/P (MAP) Pulse Ox O2 Delivery O2 Flow Rate FiO2


 


12/10/21 16:00 37.0 87 20 144/75 (98) 98 Room Air  


 


12/6/21 15:18       2.00 





Capillary Refill : Less Than 3 Seconds


I&O











Intake and Output 


 


 12/10/21





 00:00


 


Intake Total 850 ml


 


Balance 850 ml


 


 


 


Intake Oral 0 ml


 


IV Total 850 ml


 


# Voids 2








General:  Other (awakes, follows with eyes )


Lungs:  Clear to Auscultation, Normal Air Movement


Heart:  Regular Rate, No Murmurs


Abdomen:  Normal Bowel Sounds, Soft, No Tenderness





Results/Procedures


Lab


Laboratory Tests


12/9/21 22:53: Glucometer 104


12/10/21 05:58: Glucometer 97


12/10/21 11:13: Glucometer 117H


12/10/21 17:07: Glucometer 108





Assessment/Plan


Assessment/Plan





(1) Altered mental status


Status:  Acute


Assessment & Plan:  - Normal UDS, hypoglycemia, encephalopathy, Will get MRI


12/8: IV pulled out ON, replaced due to hypoglycemia, MRI pending, B12/Folate 

levels pending given h/o EtOH


12/9: IV replaced started on D5NS, MRI cancelled because patient refused moving,

labs otherwise normal


12/10: Blood sugars improved with IVFs, unable to contact family


Qualifiers:  


   Qualified Codes:  R41.82 - Altered mental status, unspecified


(2) Rhabdomyolysis


Status:  Acute


Assessment & Plan:  - IVFs, will trend


Qualifiers:  


   Qualified Codes:  T79.6XXA - Traumatic ischemia of muscle, initial encounter


(3) Acute kidney injury


Status:  Resolved


Assessment & Plan:  12/8: improving, BUN elevated, continue IVFs














CHRISTIN BARFIELD MD               Dec 10, 2021 20:41

## 2021-12-11 VITALS — SYSTOLIC BLOOD PRESSURE: 106 MMHG | DIASTOLIC BLOOD PRESSURE: 71 MMHG

## 2021-12-11 VITALS — SYSTOLIC BLOOD PRESSURE: 142 MMHG | DIASTOLIC BLOOD PRESSURE: 71 MMHG

## 2021-12-11 VITALS — SYSTOLIC BLOOD PRESSURE: 127 MMHG | DIASTOLIC BLOOD PRESSURE: 68 MMHG

## 2021-12-11 VITALS — SYSTOLIC BLOOD PRESSURE: 160 MMHG | DIASTOLIC BLOOD PRESSURE: 80 MMHG

## 2021-12-11 LAB
BASOPHILS # BLD AUTO: 0 10^3/UL (ref 0–0.1)
BASOPHILS NFR BLD AUTO: 0 % (ref 0–10)
BUN/CREAT SERPL: 11
CALCIUM SERPL-MCNC: 8.4 MG/DL (ref 8.5–10.1)
CHLORIDE SERPL-SCNC: 104 MMOL/L (ref 98–107)
CO2 SERPL-SCNC: 23 MMOL/L (ref 21–32)
CREAT SERPL-MCNC: 0.73 MG/DL (ref 0.6–1.3)
EOSINOPHIL # BLD AUTO: 0.2 10^3/UL (ref 0–0.3)
EOSINOPHIL NFR BLD AUTO: 2 % (ref 0–10)
GFR SERPLBLD BASED ON 1.73 SQ M-ARVRAT: 107 ML/MIN
GLUCOSE SERPL-MCNC: 123 MG/DL (ref 70–105)
HCT VFR BLD CALC: 36 % (ref 40–54)
HGB BLD-MCNC: 13 G/DL (ref 13.3–17.7)
LYMPHOCYTES # BLD AUTO: 1.7 10^3/UL (ref 1–4)
LYMPHOCYTES NFR BLD AUTO: 19 % (ref 12–44)
MANUAL DIFFERENTIAL PERFORMED BLD QL: NO
MCH RBC QN AUTO: 31 PG (ref 25–34)
MCHC RBC AUTO-ENTMCNC: 36 G/DL (ref 32–36)
MCV RBC AUTO: 85 FL (ref 80–99)
MONOCYTES # BLD AUTO: 0.8 10^3/UL (ref 0–1)
MONOCYTES NFR BLD AUTO: 9 % (ref 0–12)
NEUTROPHILS # BLD AUTO: 6.2 10^3/UL (ref 1.8–7.8)
NEUTROPHILS NFR BLD AUTO: 69 % (ref 42–75)
PLATELET # BLD: 242 10^3/UL (ref 130–400)
PMV BLD AUTO: 10.5 FL (ref 9–12.2)
POTASSIUM SERPL-SCNC: 3.3 MMOL/L (ref 3.6–5)
SODIUM SERPL-SCNC: 140 MMOL/L (ref 135–145)
WBC # BLD AUTO: 9.1 10^3/UL (ref 4.3–11)

## 2021-12-11 RX ADMIN — SODIUM CHLORIDE, SODIUM LACTATE, POTASSIUM CHLORIDE, CALCIUM CHLORIDE, AND DEXTROSE MONOHYDRATE SCH MLS/HR: 600; 310; 30; 20; 5 INJECTION, SOLUTION INTRAVENOUS at 04:21

## 2021-12-11 RX ADMIN — SODIUM CHLORIDE, SODIUM LACTATE, POTASSIUM CHLORIDE, CALCIUM CHLORIDE, AND DEXTROSE MONOHYDRATE SCH MLS/HR: 600; 310; 30; 20; 5 INJECTION, SOLUTION INTRAVENOUS at 17:34

## 2021-12-11 NOTE — PROGRESS NOTE - HOSPITALIST
Subjective


HPI/CC On Admission


Date Seen by Provider:  Dec 11, 2021


Time Seen by Provider:  13:15


Subjective/Events-last exam


Patient eyes are open does not orient to sound and does not answer any 

questions.  Does not appear to be in acute distress





Objective


Exam


Vital Signs





Vital Signs








  Date Time  Temp Pulse Resp B/P (MAP) Pulse Ox O2 Delivery O2 Flow Rate FiO2


 


12/11/21 08:07 36.6 91 18 127/68 (87) 97 Room Air  


 


12/6/21 15:18       2.00 





Capillary Refill : Less Than 3 Seconds


General Appearance:  No Apparent Distress, Chronically ill, Thin


Respiratory:  Chest Non Tender, Lungs Clear, Normal Breath Sounds, No Accessory 

Muscle Use, No Respiratory Distress


Cardiovascular:  Regular Rate, Rhythm, No Edema, No Gallop, No JVD, No Murmur, 

Normal Peripheral Pulses


Gastrointestinal:  Normal Bowel Sounds, No Organomegaly, No Pulsatile Mass, Non 

Tender, Soft


Extremity:  Other (Unremarkable except for severe sarcopenia)





Results/Procedures


Lab


Laboratory Tests


12/11/21 08:10








Patient resulted labs reviewed.





Assessment/Plan


Assessment and Plan


Assess & Plan/Chief Complaint





(1) Altered mental status


Status:  Acute


Assessment & Plan:  - Normal UDS, hypoglycemia, encephalopathy, Will get MRI


12/8: IV pulled out ON, replaced due to hypoglycemia, MRI pending, B12/Folate 

levels pending given h/o EtOH


12/9: IV replaced started on D5NS, MRI cancelled because patient refused moving,

labs otherwise normal


12/10: Blood sugars improved with IVFs, unable to contact family.


12/11: Altered mental status unchanged considering homeless status 

considerations include catatonia or an event that led to anoxic encephalopathy 

continue IV fluids patient is going to require long-term placement and his 

prognosis is extremely poor.


Qualifiers:  


   Qualified Codes:  R41.82 - Altered mental status, unspecified


(2) Rhabdomyolysis


Status:  Acute


Assessment & Plan:  - IVFs, will trend


Qualifiers:  


   Qualified Codes:  T79.6XXA - Traumatic ischemia of muscle, initial encounter


(3) Acute kidney injury


Status:  Resolved


Assessment & Plan:  12/8: improving, BUN elevated, continue IVF











JUN MONIQUE MD              Dec 11, 2021 13:19

## 2021-12-12 VITALS — SYSTOLIC BLOOD PRESSURE: 101 MMHG | DIASTOLIC BLOOD PRESSURE: 62 MMHG

## 2021-12-12 VITALS — SYSTOLIC BLOOD PRESSURE: 105 MMHG | DIASTOLIC BLOOD PRESSURE: 54 MMHG

## 2021-12-12 VITALS — SYSTOLIC BLOOD PRESSURE: 84 MMHG | DIASTOLIC BLOOD PRESSURE: 45 MMHG

## 2021-12-12 RX ADMIN — WATER SCH ML: 1 INJECTION INTRAMUSCULAR; INTRAVENOUS; SUBCUTANEOUS at 16:16

## 2021-12-12 RX ADMIN — SODIUM CHLORIDE, SODIUM LACTATE, POTASSIUM CHLORIDE, CALCIUM CHLORIDE, AND DEXTROSE MONOHYDRATE SCH MLS/HR: 600; 310; 30; 20; 5 INJECTION, SOLUTION INTRAVENOUS at 06:00

## 2021-12-12 RX ADMIN — WATER SCH ML: 1 INJECTION INTRAMUSCULAR; INTRAVENOUS; SUBCUTANEOUS at 11:16

## 2021-12-12 RX ADMIN — ZIPRASIDONE HCL ONE MG: 20 CAPSULE ORAL at 21:24

## 2021-12-12 NOTE — PROGRESS NOTE - HOSPITALIST
Subjective


HPI/CC On Admission


Date Seen by Provider:  Dec 12, 2021


Time Seen by Provider:  09:30


Subjective/Events-last exam


Upon my arrival this morning the patient responded to his name with obvious 

paranoidal ideation reporting that I was part of the death squad and requested 

that I leave the room followed by several grandiose declarations with Church 

undertones.  Past medical record was reviewed and he has been on olanzapine in 

the past 5 mg daily.  He had eaten breakfast without any nausea or vomiting his 

first meal since admission.  Unable to get any other history secondary to 

paranoid ideation etc.





Objective


Exam


Vital Signs





Vital Signs








  Date Time  Temp Pulse Resp B/P (MAP) Pulse Ox O2 Delivery O2 Flow Rate FiO2


 


12/12/21 09:00      Room Air  


 


12/12/21 07:19 37.1 74 18 105/54 (71) 99   


 


12/6/21 15:18       2.00 





Capillary Refill : Less Than 3 Seconds


General Appearance:  Cachetic, Other (Patient too agitated for physical 

examination deferred.)





Results/Procedures


Lab


Patient resulted labs reviewed.





Assessment/Plan


Assessment and Plan


Assess & Plan/Chief Complaint





(1) Altered mental status


Status:  Acute


Assessment & Plan:  - Normal UDS, hypoglycemia, encephalopathy, Will get MRI


12/8: IV pulled out ON, replaced due to hypoglycemia, MRI pending, B12/Folate 

levels pending given h/o EtOH


12/9: IV replaced started on D5NS, MRI cancelled because patient refused moving,

labs otherwise normal


12/10: Blood sugars improved with IVFs, unable to contact family.


12/11: Altered mental status unchanged considering homeless status 

considerations include catatonia or an event that led to anoxic encephalopathy 

continue IV fluids patient is going to require long-term placement and his 

prognosis is extremely poor.


12/12: Patient clearly schizophrenic no longer catatonic but now agitated will 

initiate olanzapine 5 mg now and then 5 mg at at bedtime nightly will require 

mental health evaluation tomorrow and likely consideration for transfer to our 

[facility] psychiatric hospital.


Qualifiers:  


   Qualified Codes:  R41.82 - Altered mental status, unspecified


(2) Rhabdomyolysis resolved


Status:  Acute


Assessment & Plan:  - IVFs, will trend


Qualifiers:  


   Qualified Codes:  T79.6XXA - Traumatic ischemia of muscle, initial encounter


(3) Acute kidney injury


Status:  Resolved


Assessment & Plan:  12/8: improving, BUN elevated, continue IVF











JUN MONIQUE MD              Dec 12, 2021 10:38

## 2021-12-13 VITALS — SYSTOLIC BLOOD PRESSURE: 143 MMHG | DIASTOLIC BLOOD PRESSURE: 86 MMHG

## 2021-12-13 VITALS — SYSTOLIC BLOOD PRESSURE: 147 MMHG | DIASTOLIC BLOOD PRESSURE: 83 MMHG

## 2021-12-13 VITALS — DIASTOLIC BLOOD PRESSURE: 58 MMHG | SYSTOLIC BLOOD PRESSURE: 102 MMHG

## 2021-12-13 RX ADMIN — ZIPRASIDONE HCL ONE MG: 20 CAPSULE ORAL at 06:55

## 2021-12-13 RX ADMIN — ENOXAPARIN SODIUM SCH MG: 30 INJECTION SUBCUTANEOUS at 22:20

## 2021-12-13 NOTE — PROGRESS NOTE
Subjective


Subjective/Events-last exam


Seen at 1115 am. Sleeping deeply apparently, does not respond to voice and given

episodes of agitation and near aggression, did not attempt physical stimuli to 

wake up. No respiratory distress, breathing quiet.





Objective


Exam


Last Set of Vital Signs





Vital Signs








  Date Time  Temp Pulse Resp B/P (MAP) Pulse Ox O2 Delivery O2 Flow Rate FiO2


 


12/13/21 08:00      Room Air  


 


12/13/21 08:00 37.2 88 20 143/86 (105) 95   





Capillary Refill : Less Than 3 Seconds


I&O











Intake and Output 


 


 12/13/21





 00:00


 


Intake Total 870 ml


 


Balance 870 ml


 


 


 


Intake Oral 870 ml


 


# Voids 4








General:  Other (somnolent, does not respond to voice)





Assessment/Plan


Assessment/Plan





(1) Altered mental status


Status:  Acute


Assessment & Plan:  - Normal UDS, hypoglycemia, encephalopathy, Will get MRI


12/8: IV pulled out ON, replaced due to hypoglycemia, MRI pending, B12/Folate 

levels pending given h/o EtOH


12/9: IV replaced started on D5NS, MRI cancelled because patient refused moving,

labs otherwise normal


12/10: Blood sugars improved with IVFs, unable to contact family


12/13- over the weekend, had episodes of marked agitation requiring 

antipsychotics, now somnolent again, concern for psychosis, may need Psych 

placement.


Qualifiers:  


   Qualified Codes:  R41.82 - Altered mental status, unspecified


(2) Rhabdomyolysis


Status:  Resolved


Assessment & Plan:  - IVFs, will trend


Qualifiers:  


   Qualified Codes:  T79.6XXA - Traumatic ischemia of muscle, initial encounter


(3) Acute kidney injury


Status:  Resolved


Assessment & Plan:  12/8: improving, BUN elevated, continue IVFs





(4) Bipolar disorder


Status:  Chronic


Assessment & Plan:  Had prescriptions for lithium and haldol sent in April 2021,

but apparently has not been on medication for some time.





(5) Polysubstance abuse


Status:  Chronic


Assessment & Plan:  History of substance abuse, UDS negative on admission.





(6) DVT prophylaxis


Status:  Acute


Assessment & Plan:  Enoxaparin














SAMANTHA BURGESS MD             Dec 13, 2021 15:59

## 2021-12-14 VITALS — SYSTOLIC BLOOD PRESSURE: 120 MMHG | DIASTOLIC BLOOD PRESSURE: 74 MMHG

## 2021-12-14 VITALS — SYSTOLIC BLOOD PRESSURE: 137 MMHG | DIASTOLIC BLOOD PRESSURE: 84 MMHG

## 2021-12-14 VITALS — SYSTOLIC BLOOD PRESSURE: 124 MMHG | DIASTOLIC BLOOD PRESSURE: 93 MMHG

## 2021-12-14 VITALS — DIASTOLIC BLOOD PRESSURE: 71 MMHG | SYSTOLIC BLOOD PRESSURE: 111 MMHG

## 2021-12-14 LAB
BUN/CREAT SERPL: 15
CALCIUM SERPL-MCNC: 7.9 MG/DL (ref 8.5–10.1)
CHLORIDE SERPL-SCNC: 104 MMOL/L (ref 98–107)
CO2 SERPL-SCNC: 24 MMOL/L (ref 21–32)
CREAT SERPL-MCNC: 0.75 MG/DL (ref 0.6–1.3)
GFR SERPLBLD BASED ON 1.73 SQ M-ARVRAT: 104 ML/MIN
GLUCOSE SERPL-MCNC: 90 MG/DL (ref 70–105)
POTASSIUM SERPL-SCNC: 3.2 MMOL/L (ref 3.6–5)
SODIUM SERPL-SCNC: 138 MMOL/L (ref 135–145)

## 2021-12-14 NOTE — PROGRESS NOTE
Subjective


Subjective/Events-last exam


Pt seen about 1155 am. He is sitting up in bed, appears alert, but does not 

respond or make eye contact. However, when I asked if I could listen to his 

heart, he started to lay back, but as I approached with stethescope he pulled 

away and when I asked again, he shook his head no.





Objective


Exam


Last Set of Vital Signs





Vital Signs








  Date Time  Temp Pulse Resp B/P (MAP) Pulse Ox O2 Delivery O2 Flow Rate FiO2


 


12/14/21 16:00 36.7 102 18 120/74 (89) 98 Room Air  





Capillary Refill : Less Than 3 Seconds


I&O











Intake and Output 


 


 12/14/21





 00:00


 


Intake Total 400 ml


 


Balance 400 ml


 


 


 


Intake Oral 400 ml


 


# Voids 5


 


# Bowel Movements 1








General:  Alert


Psych/Mental Status:  Other (alert and shakes head to some questions, but does 

not speak or follow instructions)





Results/Procedures


Lab


Laboratory Tests


12/14/21 05:19: 


Sodium Level 138, Potassium Level 3.2L, Chloride Level 104, Carbon Dioxide Level

24, Anion Gap 10, Blood Urea Nitrogen 11, Creatinine 0.75, Estimat Glomerular 

Filtration Rate 104, BUN/Creatinine Ratio 15, Glucose Level 90, Calcium Level 

7.9L





Assessment/Plan


Assessment/Plan





(1) Altered mental status


Status:  Acute


Assessment & Plan:  - Normal UDS, hypoglycemia, encephalopathy, Will get MRI


12/8: IV pulled out ON, replaced due to hypoglycemia, MRI pending, B12/Folate 

levels pending given h/o EtOH


12/9: IV replaced started on D5NS, MRI cancelled because patient refused moving,

labs otherwise normal


12/10: Blood sugars improved with IVFs, unable to contact family


12/13- over the weekend, had episodes of marked agitation requiring 

antipsychotics, now somnolent again, concern for psychosis, may need Psych 

placement.


12/14- concern for catatonia, will trial lorazepam challenge


Qualifiers:  


   Qualified Codes:  R41.82 - Altered mental status, unspecified


(2) Rhabdomyolysis


Status:  Resolved


Assessment & Plan:  - IVFs, will trend


Qualifiers:  


   Qualified Codes:  T79.6XXA - Traumatic ischemia of muscle, initial encounter


(3) Acute kidney injury


Status:  Resolved


Assessment & Plan:  12/8: improving, BUN elevated, continue IVFs





(4) Bipolar disorder


Status:  Chronic


Assessment & Plan:  Had prescriptions for lithium and haldol sent in April 2021,

but apparently has not been on medication for some time.





(5) Polysubstance abuse


Status:  Chronic


Assessment & Plan:  History of substance abuse, UDS negative on admission.





(6) DVT prophylaxis


Status:  Acute


Assessment & Plan:  Enoxaparin














SAMANTHA BURGESS MD             Dec 14, 2021 20:50

## 2021-12-15 VITALS — DIASTOLIC BLOOD PRESSURE: 71 MMHG | SYSTOLIC BLOOD PRESSURE: 136 MMHG

## 2021-12-15 VITALS — DIASTOLIC BLOOD PRESSURE: 45 MMHG | SYSTOLIC BLOOD PRESSURE: 95 MMHG

## 2021-12-15 VITALS — SYSTOLIC BLOOD PRESSURE: 130 MMHG | DIASTOLIC BLOOD PRESSURE: 60 MMHG

## 2021-12-15 LAB
BUN/CREAT SERPL: 15
CALCIUM SERPL-MCNC: 8.4 MG/DL (ref 8.5–10.1)
CHLORIDE SERPL-SCNC: 103 MMOL/L (ref 98–107)
CO2 SERPL-SCNC: 26 MMOL/L (ref 21–32)
CREAT SERPL-MCNC: 0.88 MG/DL (ref 0.6–1.3)
GFR SERPLBLD BASED ON 1.73 SQ M-ARVRAT: 86 ML/MIN
GLUCOSE SERPL-MCNC: 110 MG/DL (ref 70–105)
HCT VFR BLD CALC: 36 % (ref 40–54)
HGB BLD-MCNC: 12.6 G/DL (ref 13.3–17.7)
MCH RBC QN AUTO: 31 PG (ref 25–34)
MCHC RBC AUTO-ENTMCNC: 35 G/DL (ref 32–36)
MCV RBC AUTO: 88 FL (ref 80–99)
PLATELET # BLD: 226 10^3/UL (ref 130–400)
PMV BLD AUTO: 10.9 FL (ref 9–12.2)
POTASSIUM SERPL-SCNC: 3.2 MMOL/L (ref 3.6–5)
SODIUM SERPL-SCNC: 141 MMOL/L (ref 135–145)
WBC # BLD AUTO: 5.6 10^3/UL (ref 4.3–11)

## 2021-12-15 RX ADMIN — ENOXAPARIN SODIUM SCH MG: 30 INJECTION SUBCUTANEOUS at 20:39

## 2021-12-15 RX ADMIN — ENOXAPARIN SODIUM SCH MG: 30 INJECTION SUBCUTANEOUS at 00:17

## 2021-12-15 NOTE — PROGRESS NOTE
Subjective


Subjective/Events-last exam


This morning became alert and ate breakfast and has been speaking. He still 

doesn't say much, but when asked how he is feeling stated not well, when asked 

why not, said he has things he needs to get worked out. When I asked if I could 

listen to his heart and lungs he said no.





Objective


Exam


Last Set of Vital Signs





Vital Signs








  Date Time  Temp Pulse Resp B/P (MAP) Pulse Ox O2 Delivery O2 Flow Rate FiO2


 


12/15/21 08:00      Room Air  


 


12/15/21 07:39 37.2 96 20 95/45 (62) 98   





Capillary Refill : Less Than 3 Seconds


I&O











Intake and Output 


 


 12/15/21





 00:00


 


Intake Total 690 ml


 


Balance 690 ml


 


 


 


Intake Oral 690 ml


 


# Voids 6








General:  Alert


Psych/Mental Status:  Other (flat affect, short answers, non-cooperative with 

exam)





Results/Procedures


Lab


Laboratory Tests


12/15/21 05:30: 


White Blood Count 5.6, Red Blood Count 4.10L, Hemoglobin 12.6L, Hematocrit 36L, 

Mean Corpuscular Volume 88, Mean Corpuscular Hemoglobin 31, Mean Corpuscular He

moglobin Concent 35, Red Cell Distribution Width 13.0, Platelet Count 226, Mean 

Platelet Volume 10.9, Sodium Level 141, Potassium Level 3.2L, Chloride Level 

103, Carbon Dioxide Level 26, Anion Gap 12, Blood Urea Nitrogen 13, Creatinine 

0.88, Estimat Glomerular Filtration Rate 86, BUN/Creatinine Ratio 15, Glucose 

Level 110H, Calcium Level 8.4L





Assessment/Plan


Assessment/Plan





(1) Altered mental status


Status:  Acute


Assessment & Plan:  - Normal UDS, hypoglycemia, encephalopathy, Will get MRI


12/8: IV pulled out ON, replaced due to hypoglycemia, MRI pending, B12/Folate 

levels pending given h/o EtOH


12/9: IV replaced started on D5NS, MRI cancelled because patient refused moving,

labs otherwise normal


12/10: Blood sugars improved with IVFs, unable to contact family


12/13- over the weekend, had episodes of marked agitation requiring 

antipsychotics, now somnolent again, concern for psychosis, may need Psych 

placement.


12/14- concern for catatonia, will trial lorazepam challenge


12/15- was going to try lorazepam challenge this am, but started responding 

prior to dosing, will have mental health re-eval


Qualifiers:  


   Qualified Codes:  R41.82 - Altered mental status, unspecified


(2) Rhabdomyolysis


Status:  Resolved


Assessment & Plan:  - IVFs, will trend


Qualifiers:  


   Qualified Codes:  T79.6XXA - Traumatic ischemia of muscle, initial encounter


(3) Acute kidney injury


Status:  Resolved


Assessment & Plan:  12/8: improving, BUN elevated, continue IVFs





(4) Bipolar disorder


Status:  Chronic


Assessment & Plan:  Had prescriptions for lithium and haldol sent in April 2021,

but apparently has not been on medication for some time.





(5) Polysubstance abuse


Status:  Chronic


Assessment & Plan:  History of substance abuse, UDS negative on admission.





(6) DVT prophylaxis


Status:  Acute


Assessment & Plan:  Enoxaparin














SAMANTHA BURGESS MD             Dec 15, 2021 13:40

## 2021-12-16 VITALS — DIASTOLIC BLOOD PRESSURE: 67 MMHG | SYSTOLIC BLOOD PRESSURE: 112 MMHG

## 2021-12-16 VITALS — SYSTOLIC BLOOD PRESSURE: 128 MMHG | DIASTOLIC BLOOD PRESSURE: 77 MMHG

## 2021-12-16 VITALS — DIASTOLIC BLOOD PRESSURE: 82 MMHG | SYSTOLIC BLOOD PRESSURE: 138 MMHG

## 2021-12-16 VITALS — DIASTOLIC BLOOD PRESSURE: 73 MMHG | SYSTOLIC BLOOD PRESSURE: 142 MMHG

## 2021-12-16 LAB
BUN/CREAT SERPL: 14
CALCIUM SERPL-MCNC: 8.1 MG/DL (ref 8.5–10.1)
CHLORIDE SERPL-SCNC: 107 MMOL/L (ref 98–107)
CO2 SERPL-SCNC: 26 MMOL/L (ref 21–32)
CREAT SERPL-MCNC: 0.71 MG/DL (ref 0.6–1.3)
GFR SERPLBLD BASED ON 1.73 SQ M-ARVRAT: 111 ML/MIN
GLUCOSE SERPL-MCNC: 97 MG/DL (ref 70–105)
POTASSIUM SERPL-SCNC: 3.2 MMOL/L (ref 3.6–5)
SODIUM SERPL-SCNC: 140 MMOL/L (ref 135–145)

## 2021-12-16 RX ADMIN — ENOXAPARIN SODIUM SCH MG: 30 INJECTION SUBCUTANEOUS at 21:58

## 2021-12-16 NOTE — DISCHARGE SUMMARY
Discharge Summary


Hospital Course


Problems/Diagnosis:  


(1) Altered mental status


Status:  Acute


Assessment & Plan:  - Normal UDS, hypoglycemia, encephalopathy, Will get MRI


12/8: IV pulled out ON, replaced due to hypoglycemia, MRI pending, B12/Folate 

levels pending given h/o EtOH


12/9: IV replaced started on D5NS, MRI cancelled because patient refused moving,

labs otherwise normal


12/10: Blood sugars improved with IVFs, unable to contact family


12/13- over the weekend, had episodes of marked agitation requiring 

antipsychotics, now somnolent again, concern for psychosis, may need Psych 

placement.


12/14- concern for catatonia, will trial lorazepam challenge


12/15- was going to try lorazepam challenge this am, but started responding 

prior to dosing, will have mental health re-eval


12/16- intermittently patient was talking and eating, etc yesterday, but after 

initially talking to mental health when he arrived, patient then stopped talking

and did not respond again. He later did say he wanted to talk to Tye Yen from 

[facility], but again when he came, he did not talk to him. Today, he is not 

answering questions or opening his eyes, but has occasionally made irritated 

appearing faces when people do exams, etc. Discussed with Tye Yen, he cannot 

screen for involuntary Psych placement, and there is some concern for 

malingering which would make acceptance at Psych hospital less likely as well. 

Given all this and the intermittent clear and normal status, and medical 

conditions resolved, there is no more that is expected to beneficial in this a

cute hospital stay, so patient was notified he would be discharged today.


Qualifiers:  


   Qualified Codes:  R41.82 - Altered mental status, unspecified


(2) Rhabdomyolysis


Status:  Resolved


Resolution Date/Time:  12/13/21 @ 21:37


Assessment & Plan:  - IVFs, will trend


Qualifiers:  


   Qualified Codes:  T79.6XXA - Traumatic ischemia of muscle, initial encounter


(3) Acute kidney injury


Status:  Resolved


Resolution Date/Time:  12/9/21 @ 18:24


Assessment & Plan:  12/8: improving, BUN elevated, continue IVFs





(4) Bipolar disorder


Status:  Chronic


Assessment & Plan:  Had prescriptions for lithium and haldol sent in April 2021,

but apparently has not been on medication for some time.





(5) Polysubstance abuse


Status:  Chronic


Assessment & Plan:  History of substance abuse, UDS negative on admission.





Hospital Course


Date of Admission: Dec 9, 2021 at 13:49 


Admission Diagnosis :  


See problem list





[facility] [place]  





Date of Discharge: 12/16/21 


Discharge Diagnosis: 


See problem list








Hospital Course:


See problem list.














Labs and Pending Lab Test:


Laboratory Tests


12/16/21 00:48: Glucometer 96


12/16/21 09:55: 


Sodium Level 140, Potassium Level 3.2L, Chloride Level 107, Carbon Dioxide Level

26, Anion Gap 7, Blood Urea Nitrogen 10, Creatinine 0.71, Estimat Glomerular 

Filtration Rate 111, BUN/Creatinine Ratio 14, Glucose Level 97, Calcium Level 

8.1L





Home Meds


Active


Active Prescriptions or Reported Medications Unobtainable


Assessment/Pt DC Instructions


Follow up with primary within one week of discharge.


Discharge Diet:  No Restrictions


Activity as Tolerated:  Yes





Discharge Physical Examination


Allergies:  


Coded Allergies:  


     No Known Drug Allergies (Unverified , 8/25/19)


General Appearance:  No Apparent Distress


Respiratory:  Lungs Clear, Normal Breath Sounds


Cardiovascular:  Regular Rate, Rhythm, No Murmur


Gastrointestinal:  Normal Bowel Sounds, Non Tender


Skin:  Normal Color, Warm/Dry


Neurologic/Psychiatric:  Other (laying with eyes closed, breathing easily, does 

not answer any questions, has muscle tone in all extremities)











SAMANTHA BURGESS MD             Dec 16, 2021 21:20

## 2021-12-17 VITALS — DIASTOLIC BLOOD PRESSURE: 77 MMHG | SYSTOLIC BLOOD PRESSURE: 128 MMHG
